# Patient Record
Sex: MALE | Race: WHITE | NOT HISPANIC OR LATINO | Employment: FULL TIME | URBAN - NONMETROPOLITAN AREA
[De-identification: names, ages, dates, MRNs, and addresses within clinical notes are randomized per-mention and may not be internally consistent; named-entity substitution may affect disease eponyms.]

---

## 2017-02-23 ENCOUNTER — LAB (OUTPATIENT)
Dept: FAMILY MEDICINE CLINIC | Facility: CLINIC | Age: 47
End: 2017-02-23

## 2017-02-23 DIAGNOSIS — E03.8 OTHER SPECIFIED HYPOTHYROIDISM: ICD-10-CM

## 2017-02-23 DIAGNOSIS — E78.5 HYPERLIPIDEMIA: ICD-10-CM

## 2017-02-23 DIAGNOSIS — K21.9 GASTROESOPHAGEAL REFLUX DISEASE WITHOUT ESOPHAGITIS: ICD-10-CM

## 2017-02-23 DIAGNOSIS — R73.03 PREDIABETES: ICD-10-CM

## 2017-02-23 LAB
ALBUMIN SERPL-MCNC: 4.3 G/DL (ref 3.5–5)
ALBUMIN/GLOB SERPL: 1.5 G/DL (ref 1.5–2.5)
ALP SERPL-CCNC: 69 U/L (ref 40–129)
ALT SERPL W P-5'-P-CCNC: 51 U/L (ref 10–44)
ANION GAP SERPL CALCULATED.3IONS-SCNC: 6 MMOL/L (ref 3.6–11.2)
AST SERPL-CCNC: 28 U/L (ref 10–34)
BASOPHILS # BLD AUTO: 0.02 10*3/MM3 (ref 0–0.3)
BASOPHILS NFR BLD AUTO: 0.3 % (ref 0–2)
BILIRUB SERPL-MCNC: 0.5 MG/DL (ref 0.2–1.8)
BUN BLD-MCNC: 15 MG/DL (ref 7–21)
BUN/CREAT SERPL: 13.3 (ref 7–25)
CALCIUM SPEC-SCNC: 9.6 MG/DL (ref 7.7–10)
CHLORIDE SERPL-SCNC: 108 MMOL/L (ref 99–112)
CHOLEST SERPL-MCNC: 198 MG/DL (ref 0–200)
CO2 SERPL-SCNC: 27 MMOL/L (ref 24.3–31.9)
CREAT BLD-MCNC: 1.13 MG/DL (ref 0.43–1.29)
DEPRECATED RDW RBC AUTO: 41.1 FL (ref 37–54)
EOSINOPHIL # BLD AUTO: 0.2 10*3/MM3 (ref 0–0.7)
EOSINOPHIL NFR BLD AUTO: 3.5 % (ref 0–5)
ERYTHROCYTE [DISTWIDTH] IN BLOOD BY AUTOMATED COUNT: 12.8 % (ref 11.5–14.5)
GFR SERPL CREATININE-BSD FRML MDRD: 70 ML/MIN/1.73
GLOBULIN UR ELPH-MCNC: 2.9 GM/DL
GLUCOSE BLD-MCNC: 106 MG/DL (ref 70–110)
HBA1C MFR BLD: 5.3 % (ref 4.5–5.7)
HCT VFR BLD AUTO: 45.8 % (ref 42–52)
HDLC SERPL-MCNC: 37 MG/DL (ref 60–100)
HGB BLD-MCNC: 14.9 G/DL (ref 14–18)
IMM GRANULOCYTES # BLD: 0.01 10*3/MM3 (ref 0–0.03)
IMM GRANULOCYTES NFR BLD: 0.2 % (ref 0–0.5)
LDLC SERPL CALC-MCNC: 134 MG/DL (ref 0–100)
LDLC/HDLC SERPL: 3.62 {RATIO}
LYMPHOCYTES # BLD AUTO: 2.05 10*3/MM3 (ref 1–3)
LYMPHOCYTES NFR BLD AUTO: 35.7 % (ref 21–51)
MCH RBC QN AUTO: 28.8 PG (ref 27–33)
MCHC RBC AUTO-ENTMCNC: 32.5 G/DL (ref 33–37)
MCV RBC AUTO: 88.6 FL (ref 80–94)
MONOCYTES # BLD AUTO: 0.52 10*3/MM3 (ref 0.1–0.9)
MONOCYTES NFR BLD AUTO: 9.1 % (ref 0–10)
NEUTROPHILS # BLD AUTO: 2.94 10*3/MM3 (ref 1.4–6.5)
NEUTROPHILS NFR BLD AUTO: 51.2 % (ref 30–70)
OSMOLALITY SERPL CALC.SUM OF ELEC: 282.5 MOSM/KG (ref 273–305)
PLATELET # BLD AUTO: 256 10*3/MM3 (ref 130–400)
PMV BLD AUTO: 11.8 FL (ref 6–10)
POTASSIUM BLD-SCNC: 4.2 MMOL/L (ref 3.5–5.3)
PROT SERPL-MCNC: 7.2 G/DL (ref 6–8)
RBC # BLD AUTO: 5.17 10*6/MM3 (ref 4.7–6.1)
SODIUM BLD-SCNC: 141 MMOL/L (ref 135–153)
T3FREE SERPL-MCNC: 3.6 PG/ML (ref 2.3–4.2)
T4 FREE SERPL-MCNC: 1.08 NG/DL (ref 0.89–1.76)
TRIGL SERPL-MCNC: 136 MG/DL (ref 0–150)
TSH SERPL DL<=0.05 MIU/L-ACNC: 0.62 MIU/ML (ref 0.55–4.78)
VLDLC SERPL-MCNC: 27.2 MG/DL
WBC NRBC COR # BLD: 5.74 10*3/MM3 (ref 4.5–12.5)

## 2017-02-23 PROCEDURE — 83036 HEMOGLOBIN GLYCOSYLATED A1C: CPT | Performed by: GENERAL PRACTICE

## 2017-02-23 PROCEDURE — 80053 COMPREHEN METABOLIC PANEL: CPT | Performed by: GENERAL PRACTICE

## 2017-02-23 PROCEDURE — 84481 FREE ASSAY (FT-3): CPT | Performed by: GENERAL PRACTICE

## 2017-02-23 PROCEDURE — 84439 ASSAY OF FREE THYROXINE: CPT | Performed by: GENERAL PRACTICE

## 2017-02-23 PROCEDURE — 80061 LIPID PANEL: CPT | Performed by: GENERAL PRACTICE

## 2017-02-23 PROCEDURE — 85025 COMPLETE CBC W/AUTO DIFF WBC: CPT | Performed by: GENERAL PRACTICE

## 2017-02-23 PROCEDURE — 36415 COLL VENOUS BLD VENIPUNCTURE: CPT

## 2017-02-23 PROCEDURE — 84443 ASSAY THYROID STIM HORMONE: CPT | Performed by: GENERAL PRACTICE

## 2017-03-27 RX ORDER — OSELTAMIVIR PHOSPHATE 75 MG/1
75 CAPSULE ORAL DAILY
Qty: 10 CAPSULE | Refills: 0 | Status: SHIPPED | OUTPATIENT
Start: 2017-03-27 | End: 2017-05-05

## 2017-04-06 DIAGNOSIS — E03.9 ACQUIRED HYPOTHYROIDISM: ICD-10-CM

## 2017-04-06 RX ORDER — LEVOTHYROXINE SODIUM 88 MCG
TABLET ORAL
Qty: 30 TABLET | Refills: 5 | Status: SHIPPED | OUTPATIENT
Start: 2017-04-06 | End: 2017-05-05 | Stop reason: SDUPTHER

## 2017-05-05 ENCOUNTER — OFFICE VISIT (OUTPATIENT)
Dept: FAMILY MEDICINE CLINIC | Facility: CLINIC | Age: 47
End: 2017-05-05

## 2017-05-05 DIAGNOSIS — R73.03 PREDIABETES: ICD-10-CM

## 2017-05-05 DIAGNOSIS — M54.59 MECHANICAL LOW BACK PAIN: ICD-10-CM

## 2017-05-05 DIAGNOSIS — K76.0 NON-ALCOHOLIC FATTY LIVER DISEASE: ICD-10-CM

## 2017-05-05 DIAGNOSIS — E03.8 HYPOTHYROIDISM DUE TO HASHIMOTO'S THYROIDITIS: ICD-10-CM

## 2017-05-05 DIAGNOSIS — B00.9 HSV-1 INFECTION: ICD-10-CM

## 2017-05-05 DIAGNOSIS — E78.5 DYSLIPIDEMIA: ICD-10-CM

## 2017-05-05 DIAGNOSIS — K21.9 GASTROESOPHAGEAL REFLUX DISEASE WITHOUT ESOPHAGITIS: ICD-10-CM

## 2017-05-05 DIAGNOSIS — E06.3 HYPOTHYROIDISM DUE TO HASHIMOTO'S THYROIDITIS: ICD-10-CM

## 2017-05-05 DIAGNOSIS — J30.9 CHRONIC ALLERGIC RHINITIS: ICD-10-CM

## 2017-05-05 DIAGNOSIS — G43.009 MIGRAINE WITHOUT AURA AND WITHOUT STATUS MIGRAINOSUS, NOT INTRACTABLE: Primary | ICD-10-CM

## 2017-05-05 DIAGNOSIS — E03.9 ACQUIRED HYPOTHYROIDISM: ICD-10-CM

## 2017-05-05 PROCEDURE — 99214 OFFICE O/P EST MOD 30 MIN: CPT | Performed by: GENERAL PRACTICE

## 2017-05-05 RX ORDER — FLUTICASONE PROPIONATE 50 MCG
SPRAY, SUSPENSION (ML) NASAL
Qty: 1 BOTTLE | Refills: 5 | Status: SHIPPED | OUTPATIENT
Start: 2017-05-05 | End: 2018-05-12

## 2017-05-05 RX ORDER — LEVOTHYROXINE SODIUM 88 MCG
88 TABLET ORAL DAILY
Qty: 30 TABLET | Refills: 5 | Status: SHIPPED | OUTPATIENT
Start: 2017-05-05 | End: 2017-10-31 | Stop reason: SDUPTHER

## 2017-05-06 VITALS
OXYGEN SATURATION: 97 % | HEART RATE: 98 BPM | WEIGHT: 220 LBS | SYSTOLIC BLOOD PRESSURE: 120 MMHG | TEMPERATURE: 98.4 F | HEIGHT: 72 IN | BODY MASS INDEX: 29.8 KG/M2 | RESPIRATION RATE: 12 BRPM | DIASTOLIC BLOOD PRESSURE: 70 MMHG

## 2017-05-06 PROBLEM — B00.9 HSV-1 INFECTION: Status: ACTIVE | Noted: 2017-05-06

## 2017-10-31 DIAGNOSIS — E03.9 ACQUIRED HYPOTHYROIDISM: ICD-10-CM

## 2017-11-01 RX ORDER — LEVOTHYROXINE SODIUM 88 MCG
88 TABLET ORAL DAILY
Qty: 30 TABLET | Refills: 5 | Status: SHIPPED | OUTPATIENT
Start: 2017-11-01 | End: 2017-11-10 | Stop reason: SDUPTHER

## 2017-11-03 ENCOUNTER — LAB (OUTPATIENT)
Dept: FAMILY MEDICINE CLINIC | Facility: CLINIC | Age: 47
End: 2017-11-03

## 2017-11-03 DIAGNOSIS — E03.8 HYPOTHYROIDISM DUE TO HASHIMOTO'S THYROIDITIS: ICD-10-CM

## 2017-11-03 DIAGNOSIS — E06.3 HYPOTHYROIDISM DUE TO HASHIMOTO'S THYROIDITIS: ICD-10-CM

## 2017-11-03 DIAGNOSIS — E78.5 DYSLIPIDEMIA: ICD-10-CM

## 2017-11-03 DIAGNOSIS — R73.03 PREDIABETES: ICD-10-CM

## 2017-11-03 DIAGNOSIS — G43.009 MIGRAINE WITHOUT AURA AND WITHOUT STATUS MIGRAINOSUS, NOT INTRACTABLE: ICD-10-CM

## 2017-11-03 LAB
ALBUMIN SERPL-MCNC: 4.3 G/DL (ref 3.5–5)
ALBUMIN/GLOB SERPL: 1.5 G/DL (ref 1.5–2.5)
ALP SERPL-CCNC: 88 U/L (ref 40–129)
ALT SERPL W P-5'-P-CCNC: 61 U/L (ref 10–44)
ANION GAP SERPL CALCULATED.3IONS-SCNC: 4.8 MMOL/L (ref 3.6–11.2)
AST SERPL-CCNC: 27 U/L (ref 10–34)
BASOPHILS # BLD AUTO: 0.02 10*3/MM3 (ref 0–0.3)
BASOPHILS NFR BLD AUTO: 0.3 % (ref 0–2)
BILIRUB SERPL-MCNC: 0.6 MG/DL (ref 0.2–1.8)
BUN BLD-MCNC: 12 MG/DL (ref 7–21)
BUN/CREAT SERPL: 11 (ref 7–25)
CALCIUM SPEC-SCNC: 9.9 MG/DL (ref 7.7–10)
CHLORIDE SERPL-SCNC: 109 MMOL/L (ref 99–112)
CHOLEST SERPL-MCNC: 186 MG/DL (ref 0–200)
CO2 SERPL-SCNC: 27.2 MMOL/L (ref 24.3–31.9)
CREAT BLD-MCNC: 1.09 MG/DL (ref 0.43–1.29)
DEPRECATED RDW RBC AUTO: 41 FL (ref 37–54)
EOSINOPHIL # BLD AUTO: 0.19 10*3/MM3 (ref 0–0.7)
EOSINOPHIL NFR BLD AUTO: 3 % (ref 0–5)
ERYTHROCYTE [DISTWIDTH] IN BLOOD BY AUTOMATED COUNT: 12.8 % (ref 11.5–14.5)
GFR SERPL CREATININE-BSD FRML MDRD: 73 ML/MIN/1.73
GLOBULIN UR ELPH-MCNC: 2.9 GM/DL
GLUCOSE BLD-MCNC: 108 MG/DL (ref 70–110)
HBA1C MFR BLD: 5.9 % (ref 4.5–5.7)
HCT VFR BLD AUTO: 45.1 % (ref 42–52)
HDLC SERPL-MCNC: 36 MG/DL (ref 60–100)
HGB BLD-MCNC: 15.3 G/DL (ref 14–18)
IMM GRANULOCYTES # BLD: 0.01 10*3/MM3 (ref 0–0.03)
IMM GRANULOCYTES NFR BLD: 0.2 % (ref 0–0.5)
LDLC SERPL CALC-MCNC: 128 MG/DL (ref 0–100)
LDLC/HDLC SERPL: 3.55 {RATIO}
LYMPHOCYTES # BLD AUTO: 2.17 10*3/MM3 (ref 1–3)
LYMPHOCYTES NFR BLD AUTO: 34.1 % (ref 21–51)
MCH RBC QN AUTO: 30.1 PG (ref 27–33)
MCHC RBC AUTO-ENTMCNC: 33.9 G/DL (ref 33–37)
MCV RBC AUTO: 88.6 FL (ref 80–94)
MONOCYTES # BLD AUTO: 0.58 10*3/MM3 (ref 0.1–0.9)
MONOCYTES NFR BLD AUTO: 9.1 % (ref 0–10)
NEUTROPHILS # BLD AUTO: 3.4 10*3/MM3 (ref 1.4–6.5)
NEUTROPHILS NFR BLD AUTO: 53.3 % (ref 30–70)
OSMOLALITY SERPL CALC.SUM OF ELEC: 281.5 MOSM/KG (ref 273–305)
PLATELET # BLD AUTO: 274 10*3/MM3 (ref 130–400)
PMV BLD AUTO: 11.2 FL (ref 6–10)
POTASSIUM BLD-SCNC: 4.5 MMOL/L (ref 3.5–5.3)
PROT SERPL-MCNC: 7.2 G/DL (ref 6–8)
RBC # BLD AUTO: 5.09 10*6/MM3 (ref 4.7–6.1)
SODIUM BLD-SCNC: 141 MMOL/L (ref 135–153)
TRIGL SERPL-MCNC: 111 MG/DL (ref 0–150)
TSH SERPL DL<=0.05 MIU/L-ACNC: 2.33 MIU/ML (ref 0.55–4.78)
VLDLC SERPL-MCNC: 22.2 MG/DL
WBC NRBC COR # BLD: 6.37 10*3/MM3 (ref 4.5–12.5)

## 2017-11-03 PROCEDURE — 80061 LIPID PANEL: CPT | Performed by: GENERAL PRACTICE

## 2017-11-03 PROCEDURE — 84443 ASSAY THYROID STIM HORMONE: CPT | Performed by: GENERAL PRACTICE

## 2017-11-03 PROCEDURE — 83036 HEMOGLOBIN GLYCOSYLATED A1C: CPT | Performed by: GENERAL PRACTICE

## 2017-11-03 PROCEDURE — 36415 COLL VENOUS BLD VENIPUNCTURE: CPT

## 2017-11-03 PROCEDURE — 80053 COMPREHEN METABOLIC PANEL: CPT | Performed by: GENERAL PRACTICE

## 2017-11-03 PROCEDURE — 85025 COMPLETE CBC W/AUTO DIFF WBC: CPT | Performed by: GENERAL PRACTICE

## 2017-11-10 ENCOUNTER — OFFICE VISIT (OUTPATIENT)
Dept: FAMILY MEDICINE CLINIC | Facility: CLINIC | Age: 47
End: 2017-11-10

## 2017-11-10 DIAGNOSIS — E06.3 HYPOTHYROIDISM DUE TO HASHIMOTO'S THYROIDITIS: ICD-10-CM

## 2017-11-10 DIAGNOSIS — Z00.00 HEALTHCARE MAINTENANCE: ICD-10-CM

## 2017-11-10 DIAGNOSIS — Z23 ENCOUNTER FOR IMMUNIZATION: ICD-10-CM

## 2017-11-10 DIAGNOSIS — K76.0 NON-ALCOHOLIC FATTY LIVER DISEASE: ICD-10-CM

## 2017-11-10 DIAGNOSIS — G43.009 MIGRAINE WITHOUT AURA AND WITHOUT STATUS MIGRAINOSUS, NOT INTRACTABLE: Primary | ICD-10-CM

## 2017-11-10 DIAGNOSIS — R06.02 SHORTNESS OF BREATH ON EXERTION: ICD-10-CM

## 2017-11-10 DIAGNOSIS — R35.1 NOCTURIA: ICD-10-CM

## 2017-11-10 DIAGNOSIS — R73.03 PREDIABETES: ICD-10-CM

## 2017-11-10 DIAGNOSIS — E03.8 HYPOTHYROIDISM DUE TO HASHIMOTO'S THYROIDITIS: ICD-10-CM

## 2017-11-10 DIAGNOSIS — K21.9 GASTROESOPHAGEAL REFLUX DISEASE WITHOUT ESOPHAGITIS: ICD-10-CM

## 2017-11-10 DIAGNOSIS — E78.5 DYSLIPIDEMIA: ICD-10-CM

## 2017-11-10 DIAGNOSIS — E03.9 ACQUIRED HYPOTHYROIDISM: ICD-10-CM

## 2017-11-10 DIAGNOSIS — J30.2 CHRONIC SEASONAL ALLERGIC RHINITIS, UNSPECIFIED TRIGGER: ICD-10-CM

## 2017-11-10 PROCEDURE — 90686 IIV4 VACC NO PRSV 0.5 ML IM: CPT | Performed by: GENERAL PRACTICE

## 2017-11-10 PROCEDURE — 90471 IMMUNIZATION ADMIN: CPT | Performed by: GENERAL PRACTICE

## 2017-11-10 PROCEDURE — 99214 OFFICE O/P EST MOD 30 MIN: CPT | Performed by: GENERAL PRACTICE

## 2017-11-10 RX ORDER — LEVOTHYROXINE SODIUM 88 MCG
88 TABLET ORAL DAILY
Qty: 90 TABLET | Refills: 3 | Status: SHIPPED | OUTPATIENT
Start: 2017-11-10 | End: 2018-05-11 | Stop reason: SDUPTHER

## 2017-11-10 NOTE — PROGRESS NOTES
Subjective   Fawad Morton is a 47 y.o. male.     History of Present Illness     Dyspnea with Exertion  Patient complains of shortness of breath with exertion. When severe this is occasionally associated with generalized chest tightness and diaphoresis. There's no history of any orthopnea, PND, cough, calf pain or swelling of the ankles. Most episodes have occurred with sudden spurts of significant exertion and he denies any problem with more moderate sustained exertion. He admits to getting little regular exercise. CT coronary angiogram done on 3/23/15 was unremarkable as was a stress test performed on 2/11/15.    GERD  Patient has a history of of heartburn. Symptoms have been present for a number of years . The patient denies abdominal bloating, dysphagia, dyspepsia, hematemesis, hoarseness, melena, nausea and unexpected weight loss. Symptoms appear to be worsened by large meals and lying down. Risk factors present for GERD include caffeine use. Risk factors absent for GERD are tobacco abuse, alcohol use and NSAID use. Studies performed so far include upper endoscopy, result: positive for reflux esophagitis, upper GI, result: negative, esophageal manometry and ph/imedence: positive for reflux . Treatments tried so far include proton pump inhibitor: omeprazole. Results of treatment: no heartburn however persistent sense of fullness at the back of his throat    Nocturia  Patient gives a long history of intermittent nocturia x 1-2. Over the last 6 months he has had nights where is up as often as 4 times. He denies any daytime frequency and has had no hesitancy, change in his stream, sense of incomplete voiding, dysuria, hematuria. He contributes some episodes to waking hungry and then also going to the bathroom and his wife has also been up more at night over this period.    Hypothyroidism  Patient presents for evaluation of thyroid function. Symptoms consist of fatigue, weight gain, difficulty concentrating. The  symptoms are mild.  The problem has been better since last here. He feels that increased dose of levothyroxine helped but he is also under less stress. Previous thyroid studies include TSH. The hypothyroidism is due to Hashimoto's disease. He is currently prescribed levothyroxine. Most recent TSH:   Lab Results   Component Value Date    TSH 2.325 11/03/2017     Dyslipidemia  Compliance with treatment has been fair. The patient exercises occasionally. He is currently being prescribed the following medication for his dyslipidemia - lifestyle modifcation. Most recent lipids include  Lab Results   Component Value Date    CHOL 186 11/03/2017    CHLPL 195 01/27/2016    TRIG 111 11/03/2017    HDL 36 (L) 11/03/2017    LDLCALC 128 (H) 11/03/2017     (H) 01/27/2016     Labs  Most recent  with an A1c of 5.9. ALT 61.     The following portions of the patient's history were reviewed and updated as appropriate: allergies, current medications, past medical history, past social history and problem list.    Review of Systems   Constitutional: Positive for fatigue. Negative for appetite change, chills, fever and unexpected weight change.   HENT: Positive for postnasal drip and rhinorrhea. Negative for congestion, ear pain, sneezing, sore throat and voice change.         Sense of fullness back of throat   Eyes: Negative for visual disturbance.   Respiratory: Positive for shortness of breath. Negative for cough and wheezing.    Cardiovascular: Negative for chest pain, palpitations and leg swelling.   Gastrointestinal: Negative for abdominal pain, blood in stool, constipation, diarrhea, nausea and vomiting.   Endocrine: Negative for polydipsia and polyuria.   Genitourinary: Negative for difficulty urinating, dysuria, frequency, hematuria and urgency.        Nocturia   Musculoskeletal: Negative for arthralgias, back pain, joint swelling, myalgias and neck pain.   Skin: Negative for color change.   Neurological: Negative for  tremors, weakness, numbness and headaches.   Psychiatric/Behavioral: Positive for decreased concentration. Negative for dysphoric mood, sleep disturbance and suicidal ideas. The patient is not nervous/anxious.      Objective   Physical Exam   Constitutional: He is oriented to person, place, and time. He appears well-developed and well-nourished. He is cooperative. He does not have a sickly appearance. No distress.   Bright and in good spirits. No apparent distress. Mild truncal obesity. No pallor, jaundice, diaphoresis, or cyanosis.   HENT:   Head: Atraumatic.   Right Ear: Tympanic membrane, external ear and ear canal normal.   Left Ear: Tympanic membrane, external ear and ear canal normal.   Mouth/Throat: Oropharynx is clear and moist.   Eyes: EOM are normal. Pupils are equal, round, and reactive to light. No scleral icterus.   Neck: No JVD present. Carotid bruit is not present. No tracheal deviation present. No thyromegaly present.   Cardiovascular: Normal rate, regular rhythm, S1 normal, S2 normal, normal heart sounds and intact distal pulses.  Exam reveals no gallop.    No murmur heard.  Pulmonary/Chest: Breath sounds normal. He has no wheezes. He has no rales.   Abdominal: Soft. Normal aorta and bowel sounds are normal. He exhibits no abdominal bruit and no mass. There is no hepatosplenomegaly. There is no tenderness. No hernia.   Musculoskeletal: He exhibits no tenderness or deformity.   Lymphadenopathy:        Head (right side): No submandibular adenopathy present.        Head (left side): No submandibular adenopathy present.     He has no cervical adenopathy.   Neurological: He is alert and oriented to person, place, and time. He has normal strength and normal reflexes. He displays normal reflexes. No cranial nerve deficit. He exhibits normal muscle tone. Coordination normal.   Skin: Skin is warm and dry. No rash noted. He is not diaphoretic. No pallor. Nails show no clubbing.   Psychiatric: He has a normal  mood and affect. His behavior is normal.     Assessment/Plan   Problems Addressed this Visit        Cardiovascular and Mediastinum    Migraine without aura and without status migrainosus, not intractable       Respiratory    Chronic seasonal allergic rhinitis    Shortness of breath on exertion  Deconditioning  Encouraged to gradually increase his level of activity striving for 30 minutes of aerobic exercise 5 days weekly.   Encouraged to report if any worse or if any new symptoms or concerns.       Digestive    Non-alcoholic fatty liver disease  Reminded of the importance of weight loss  Will continue to monitor    Gastroesophageal reflux disease without esophagitis  Symptoms are currently stable.  Reminded regarding lifestyle modification.       Endocrine    Hypothyroidism due to Hashimoto's thyroiditis  Clinically euthyroid.   Continue current medication  Labs will be updated again in 6 months just prior to his return    Relevant Medications    SYNTHROID 88 MCG tablet       Genitourinary    Nocturia  No concerning features on history  Reviewed options going forward. Will monitor and patient will report if any worse or if any new associated symptoms       Other    Prediabetes  Will continue to monitor    Dyslipidemia  As above.    Healthcare maintenance  Recommended a flu shot    Relevant Orders    Flu Vaccine Quad PF 3YR+ (Completed)    Encounter for immunization    Relevant Orders    Flu Vaccine Quad PF 3YR+ (Completed)

## 2017-11-11 VITALS
HEART RATE: 86 BPM | BODY MASS INDEX: 31.02 KG/M2 | HEIGHT: 72 IN | SYSTOLIC BLOOD PRESSURE: 120 MMHG | TEMPERATURE: 96.3 F | DIASTOLIC BLOOD PRESSURE: 75 MMHG | RESPIRATION RATE: 12 BRPM | OXYGEN SATURATION: 97 % | WEIGHT: 229 LBS

## 2017-11-11 PROBLEM — R06.02 SHORTNESS OF BREATH ON EXERTION: Status: ACTIVE | Noted: 2017-11-11

## 2017-11-11 PROBLEM — R35.1 NOCTURIA: Status: ACTIVE | Noted: 2017-11-11

## 2017-11-11 PROBLEM — B00.9 HSV-1 INFECTION: Status: RESOLVED | Noted: 2017-05-06 | Resolved: 2017-11-11

## 2018-05-11 ENCOUNTER — OFFICE VISIT (OUTPATIENT)
Dept: FAMILY MEDICINE CLINIC | Facility: CLINIC | Age: 48
End: 2018-05-11

## 2018-05-11 DIAGNOSIS — M54.59 MECHANICAL LOW BACK PAIN: ICD-10-CM

## 2018-05-11 DIAGNOSIS — E03.8 HYPOTHYROIDISM DUE TO HASHIMOTO'S THYROIDITIS: ICD-10-CM

## 2018-05-11 DIAGNOSIS — K76.0 NON-ALCOHOLIC FATTY LIVER DISEASE: ICD-10-CM

## 2018-05-11 DIAGNOSIS — K21.9 GASTROESOPHAGEAL REFLUX DISEASE WITHOUT ESOPHAGITIS: ICD-10-CM

## 2018-05-11 DIAGNOSIS — J30.2 CHRONIC SEASONAL ALLERGIC RHINITIS, UNSPECIFIED TRIGGER: ICD-10-CM

## 2018-05-11 DIAGNOSIS — M72.2 PLANTAR FASCIITIS, BILATERAL: ICD-10-CM

## 2018-05-11 DIAGNOSIS — E78.5 DYSLIPIDEMIA: ICD-10-CM

## 2018-05-11 DIAGNOSIS — E06.3 HYPOTHYROIDISM DUE TO HASHIMOTO'S THYROIDITIS: ICD-10-CM

## 2018-05-11 DIAGNOSIS — Z00.00 HEALTHCARE MAINTENANCE: ICD-10-CM

## 2018-05-11 DIAGNOSIS — R06.02 SHORTNESS OF BREATH ON EXERTION: ICD-10-CM

## 2018-05-11 DIAGNOSIS — R73.03 PREDIABETES: ICD-10-CM

## 2018-05-11 DIAGNOSIS — G43.009 MIGRAINE WITHOUT AURA AND WITHOUT STATUS MIGRAINOSUS, NOT INTRACTABLE: Primary | ICD-10-CM

## 2018-05-11 PROCEDURE — 99214 OFFICE O/P EST MOD 30 MIN: CPT | Performed by: GENERAL PRACTICE

## 2018-05-11 RX ORDER — LEVOTHYROXINE SODIUM 88 MCG
88 TABLET ORAL DAILY
Qty: 90 TABLET | Refills: 3 | Status: SHIPPED | OUTPATIENT
Start: 2018-05-11 | End: 2019-05-14 | Stop reason: SDUPTHER

## 2018-05-11 NOTE — PROGRESS NOTES
Subjective   Fawad Morton is a 48 y.o. male.     History of Present Illness     GERD  Patient has a history of of heartburn. Symptoms have been present for a number of years . The patient denies abdominal bloating, dysphagia, dyspepsia, hematemesis, hoarseness, melena, nausea and unexpected weight loss. Symptoms appear to be worsened by large meals and lying down. Risk factors present for GERD include caffeine use. Risk factors absent for GERD are tobacco abuse, alcohol use and NSAID use. Studies performed so far include upper endoscopy, result: positive for reflux esophagitis, upper GI, result: negative, esophageal manometry and ph/imedence: positive for reflux . Treatments tried so far include proton pump inhibitor: omeprazole. Results of treatment: no heartburn however persistent sense of fullness at the back of his throat. He wonders whether he might have Laryngeal Sensory Neuropathy    Chronic Allergic Rhinitis  Patient has a history of allergic rhinitis. Patient's symptoms include sneezing, clear rhinorrhea, nasal congestion and postnasal drip. These symptoms are perennial with seasonal exacerbation. The patient has been suffering from these symptoms for a number of years . The patient has tried a number of antihistamines and nasal corticosteroids with a limited relief of symptoms.     Dyspnea with Exertion  Patient complains of continued shortness of breath with exertion. When severe this is occasionally associated with generalized chest tightness and diaphoresis. There's no history of any orthopnea, PND, cough, calf pain or swelling of the ankles. Most episodes have occurred with sudden spurts of significant exertion and he denies any problem with more moderate sustained exertion. He admits to getting little regular exercise. CT coronary angiogram done on 3/23/15 was unremarkable as was a stress test performed on 2/11/15    Hypothyroidism  Patient presents for evaluation of thyroid function. Symptoms  consist of fatigue, weight gain, difficulty concentrating. The symptoms are mild.  The problem has been better since last here. He feels that increased dose of levothyroxine helped but he is also under less stress. Previous thyroid studies include TSH. The hypothyroidism is due to Hashimoto's disease. He remains on levothyroxine.     Dyslipidemia  Compliance with treatment has been fair. The patient exercises occasionally. He is currently being prescribed the following medication for his dyslipidemia - lifestyle modifcation.     The following portions of the patient's history were reviewed and updated as appropriate: allergies, current medications, past medical history, past social history and problem list.    Review of Systems   Constitutional: Positive for fatigue. Negative for appetite change, chills, fever and unexpected weight change.   HENT: Positive for postnasal drip and rhinorrhea. Negative for congestion, ear pain, sneezing, sore throat and voice change.         Sense of fullness back of throat   Eyes: Negative for visual disturbance.   Respiratory: Positive for shortness of breath. Negative for cough and wheezing.    Cardiovascular: Negative for chest pain, palpitations and leg swelling.   Gastrointestinal: Negative for abdominal pain, blood in stool, constipation, diarrhea, nausea and vomiting.   Endocrine: Negative for polydipsia and polyuria.   Genitourinary: Negative for difficulty urinating, dysuria, frequency, hematuria and urgency.        Nocturia - chronic - back to baseline of 1-2 times nightly   Musculoskeletal: Negative for arthralgias, back pain, joint swelling, myalgias and neck pain.   Skin: Negative for color change.   Neurological: Negative for tremors, weakness, numbness and headaches.   Psychiatric/Behavioral: Negative for decreased concentration, dysphoric mood, sleep disturbance and suicidal ideas. The patient is not nervous/anxious.      Objective   Physical Exam   Constitutional: He  is oriented to person, place, and time. He appears well-developed and well-nourished. He is cooperative. He does not have a sickly appearance. No distress.   Bright and in good spirits. No apparent distress. Mild truncal obesity. No pallor, jaundice, diaphoresis, or cyanosis.   HENT:   Head: Atraumatic.   Right Ear: Tympanic membrane, external ear and ear canal normal.   Left Ear: Tympanic membrane, external ear and ear canal normal.   Mouth/Throat: Oropharynx is clear and moist.   Eyes: EOM are normal. Pupils are equal, round, and reactive to light. No scleral icterus.   Neck: No JVD present. Carotid bruit is not present. No tracheal deviation present. No thyromegaly present.   Cardiovascular: Normal rate, regular rhythm, S1 normal, S2 normal, normal heart sounds and intact distal pulses.  Exam reveals no gallop.    No murmur heard.  Pulmonary/Chest: Breath sounds normal. He has no wheezes. He has no rales.   Abdominal: Soft. Normal aorta and bowel sounds are normal. He exhibits no abdominal bruit and no mass. There is no hepatosplenomegaly. There is no tenderness. No hernia.   Musculoskeletal: He exhibits no tenderness or deformity.   Lymphadenopathy:        Head (right side): No submandibular adenopathy present.        Head (left side): No submandibular adenopathy present.     He has no cervical adenopathy.   Neurological: He is alert and oriented to person, place, and time. He has normal strength and normal reflexes. He displays normal reflexes. No cranial nerve deficit. He exhibits normal muscle tone. Coordination normal.   Skin: Skin is warm and dry. No rash noted. He is not diaphoretic. No pallor. Nails show no clubbing.   Psychiatric: He has a normal mood and affect. His behavior is normal.     Assessment/Plan   Problems Addressed this Visit        Cardiovascular and Mediastinum    Migraine without aura and without status migrainosus, not intractable     Relevant Orders    CBC & Differential        Respiratory    Chronic seasonal allergic rhinitis  Recommended referral to ENT and/or [facility] if [facility] would like to be evaluated for LSN. Patient will consider       Digestive    Non-alcoholic fatty liver disease    Gastroesophageal reflux disease without esophagitis  As above.       Endocrine    Hypothyroidism due to Hashimoto's thyroiditis  Clinically euthyroid.  Continue current medication.  Scheduled for updated labs just prior to his return.    Relevant Medications    SYNTHROID 88 MCG tablet    Other Relevant Orders    TSH       Nervous and Auditory    Mechanical low back pain       Musculoskeletal and Integument    Plantar fasciitis, bilateral       Other    Prediabetes  Will continue to monitor    Relevant Orders    Hemoglobin A1c    Dyslipidemia  Encouraged to continue to work on his diet and exercise plan.    Relevant Orders    Comprehensive Metabolic Panel    Lipid Panel

## 2018-05-12 VITALS
WEIGHT: 226 LBS | HEIGHT: 72 IN | DIASTOLIC BLOOD PRESSURE: 75 MMHG | TEMPERATURE: 97.9 F | SYSTOLIC BLOOD PRESSURE: 125 MMHG | HEART RATE: 83 BPM | OXYGEN SATURATION: 95 % | RESPIRATION RATE: 12 BRPM | BODY MASS INDEX: 30.61 KG/M2

## 2018-09-11 ENCOUNTER — LAB (OUTPATIENT)
Dept: FAMILY MEDICINE CLINIC | Facility: CLINIC | Age: 48
End: 2018-09-11

## 2018-09-11 DIAGNOSIS — G43.009 MIGRAINE WITHOUT AURA AND WITHOUT STATUS MIGRAINOSUS, NOT INTRACTABLE: ICD-10-CM

## 2018-09-11 DIAGNOSIS — E78.5 DYSLIPIDEMIA: ICD-10-CM

## 2018-09-11 DIAGNOSIS — E06.3 HYPOTHYROIDISM DUE TO HASHIMOTO'S THYROIDITIS: ICD-10-CM

## 2018-09-11 DIAGNOSIS — E03.8 HYPOTHYROIDISM DUE TO HASHIMOTO'S THYROIDITIS: ICD-10-CM

## 2018-09-11 DIAGNOSIS — R73.03 PREDIABETES: ICD-10-CM

## 2018-09-11 LAB
ALBUMIN SERPL-MCNC: 4.4 G/DL (ref 3.5–5)
ALBUMIN/GLOB SERPL: 1.6 G/DL (ref 1.5–2.5)
ALP SERPL-CCNC: 74 U/L (ref 40–129)
ALT SERPL W P-5'-P-CCNC: 56 U/L (ref 10–44)
ANION GAP SERPL CALCULATED.3IONS-SCNC: 6 MMOL/L (ref 3.6–11.2)
AST SERPL-CCNC: 28 U/L (ref 10–34)
BASOPHILS # BLD AUTO: 0.02 10*3/MM3 (ref 0–0.3)
BASOPHILS NFR BLD AUTO: 0.3 % (ref 0–2)
BILIRUB SERPL-MCNC: 0.8 MG/DL (ref 0.2–1.8)
BUN BLD-MCNC: 13 MG/DL (ref 7–21)
BUN/CREAT SERPL: 11.7 (ref 7–25)
CALCIUM SPEC-SCNC: 10 MG/DL (ref 7.7–10)
CHLORIDE SERPL-SCNC: 107 MMOL/L (ref 99–112)
CHOLEST SERPL-MCNC: 200 MG/DL (ref 0–200)
CO2 SERPL-SCNC: 27 MMOL/L (ref 24.3–31.9)
CREAT BLD-MCNC: 1.11 MG/DL (ref 0.43–1.29)
DEPRECATED RDW RBC AUTO: 39.6 FL (ref 37–54)
EOSINOPHIL # BLD AUTO: 0.15 10*3/MM3 (ref 0–0.7)
EOSINOPHIL NFR BLD AUTO: 2.2 % (ref 0–5)
ERYTHROCYTE [DISTWIDTH] IN BLOOD BY AUTOMATED COUNT: 12.6 % (ref 11.5–14.5)
GFR SERPL CREATININE-BSD FRML MDRD: 71 ML/MIN/1.73
GLOBULIN UR ELPH-MCNC: 2.8 GM/DL
GLUCOSE BLD-MCNC: 105 MG/DL (ref 70–110)
HBA1C MFR BLD: 6.3 % (ref 4.5–5.7)
HCT VFR BLD AUTO: 45.2 % (ref 42–52)
HDLC SERPL-MCNC: 39 MG/DL (ref 60–100)
HGB BLD-MCNC: 15.4 G/DL (ref 14–18)
IMM GRANULOCYTES # BLD: 0.01 10*3/MM3 (ref 0–0.03)
IMM GRANULOCYTES NFR BLD: 0.1 % (ref 0–0.5)
LDLC SERPL CALC-MCNC: 137 MG/DL (ref 0–100)
LDLC/HDLC SERPL: 3.5 {RATIO}
LYMPHOCYTES # BLD AUTO: 2.51 10*3/MM3 (ref 1–3)
LYMPHOCYTES NFR BLD AUTO: 37.1 % (ref 21–51)
MCH RBC QN AUTO: 29.7 PG (ref 27–33)
MCHC RBC AUTO-ENTMCNC: 34.1 G/DL (ref 33–37)
MCV RBC AUTO: 87.3 FL (ref 80–94)
MONOCYTES # BLD AUTO: 0.61 10*3/MM3 (ref 0.1–0.9)
MONOCYTES NFR BLD AUTO: 9 % (ref 0–10)
NEUTROPHILS # BLD AUTO: 3.46 10*3/MM3 (ref 1.4–6.5)
NEUTROPHILS NFR BLD AUTO: 51.3 % (ref 30–70)
OSMOLALITY SERPL CALC.SUM OF ELEC: 279.9 MOSM/KG (ref 273–305)
PLATELET # BLD AUTO: 261 10*3/MM3 (ref 130–400)
PMV BLD AUTO: 11.3 FL (ref 6–10)
POTASSIUM BLD-SCNC: 4.3 MMOL/L (ref 3.5–5.3)
PROT SERPL-MCNC: 7.2 G/DL (ref 6–8)
RBC # BLD AUTO: 5.18 10*6/MM3 (ref 4.7–6.1)
SODIUM BLD-SCNC: 140 MMOL/L (ref 135–153)
TRIGL SERPL-MCNC: 122 MG/DL (ref 0–150)
TSH SERPL DL<=0.05 MIU/L-ACNC: 1.32 MIU/ML (ref 0.55–4.78)
VLDLC SERPL-MCNC: 24.4 MG/DL
WBC NRBC COR # BLD: 6.76 10*3/MM3 (ref 4.5–12.5)

## 2018-09-11 PROCEDURE — 85025 COMPLETE CBC W/AUTO DIFF WBC: CPT | Performed by: GENERAL PRACTICE

## 2018-09-11 PROCEDURE — 36415 COLL VENOUS BLD VENIPUNCTURE: CPT

## 2018-09-11 PROCEDURE — 83036 HEMOGLOBIN GLYCOSYLATED A1C: CPT | Performed by: GENERAL PRACTICE

## 2018-09-11 PROCEDURE — 80053 COMPREHEN METABOLIC PANEL: CPT | Performed by: GENERAL PRACTICE

## 2018-09-11 PROCEDURE — 80061 LIPID PANEL: CPT | Performed by: GENERAL PRACTICE

## 2018-09-11 PROCEDURE — 84443 ASSAY THYROID STIM HORMONE: CPT | Performed by: GENERAL PRACTICE

## 2018-11-12 ENCOUNTER — OFFICE VISIT (OUTPATIENT)
Dept: FAMILY MEDICINE CLINIC | Facility: CLINIC | Age: 48
End: 2018-11-12

## 2018-11-12 VITALS
SYSTOLIC BLOOD PRESSURE: 120 MMHG | HEART RATE: 76 BPM | HEIGHT: 72 IN | BODY MASS INDEX: 30.75 KG/M2 | DIASTOLIC BLOOD PRESSURE: 70 MMHG | RESPIRATION RATE: 12 BRPM | TEMPERATURE: 97.9 F | OXYGEN SATURATION: 96 % | WEIGHT: 227 LBS

## 2018-11-12 DIAGNOSIS — M54.59 MECHANICAL LOW BACK PAIN: ICD-10-CM

## 2018-11-12 DIAGNOSIS — M72.2 PLANTAR FASCIITIS, BILATERAL: ICD-10-CM

## 2018-11-12 DIAGNOSIS — R73.03 PREDIABETES: ICD-10-CM

## 2018-11-12 DIAGNOSIS — J30.2 CHRONIC SEASONAL ALLERGIC RHINITIS: ICD-10-CM

## 2018-11-12 DIAGNOSIS — K76.0 NON-ALCOHOLIC FATTY LIVER DISEASE: ICD-10-CM

## 2018-11-12 DIAGNOSIS — E06.3 HYPOTHYROIDISM DUE TO HASHIMOTO'S THYROIDITIS: ICD-10-CM

## 2018-11-12 DIAGNOSIS — G43.009 MIGRAINE WITHOUT AURA AND WITHOUT STATUS MIGRAINOSUS, NOT INTRACTABLE: Primary | ICD-10-CM

## 2018-11-12 DIAGNOSIS — R06.02 SHORTNESS OF BREATH ON EXERTION: ICD-10-CM

## 2018-11-12 DIAGNOSIS — E78.5 DYSLIPIDEMIA: ICD-10-CM

## 2018-11-12 DIAGNOSIS — B00.1 RECURRENT COLD SORES: ICD-10-CM

## 2018-11-12 DIAGNOSIS — E03.8 HYPOTHYROIDISM DUE TO HASHIMOTO'S THYROIDITIS: ICD-10-CM

## 2018-11-12 DIAGNOSIS — K21.9 GASTROESOPHAGEAL REFLUX DISEASE WITHOUT ESOPHAGITIS: ICD-10-CM

## 2018-11-12 DIAGNOSIS — Z00.00 HEALTHCARE MAINTENANCE: ICD-10-CM

## 2018-11-12 PROCEDURE — 99214 OFFICE O/P EST MOD 30 MIN: CPT | Performed by: GENERAL PRACTICE

## 2018-11-12 RX ORDER — VALACYCLOVIR HYDROCHLORIDE 1 G/1
TABLET, FILM COATED ORAL
Qty: 12 TABLET | Refills: 3 | Status: SHIPPED | OUTPATIENT
Start: 2018-11-12 | End: 2019-12-20 | Stop reason: SDUPTHER

## 2018-11-12 NOTE — PROGRESS NOTES
Subjective   Fawad Morton is a 48 y.o. male.     History of Present Illness     Planter Fasciitis  Returns with a more than one year history of intermittent bilateral heel pain.  This has been increasing in frequency and severity and now limits his activities.  There is no history of any associated symptoms and he denies any stiffness, swelling, numbness, or tingling.  He wears supportive shoes both in an outside of the house and has tried several different inserts.  He has been doing stretches daily.  He plans on arranging an assessment with his wife podiatrist    Dyspnea with Exertion  There has been a significant improvement in his dyspnea since last here. There's no history of any further chest pain and he continues to deny any orthopnea, PND, cough, calf pain or swelling of the ankles. Most episodes occur with sudden spurts of significant exertion and he denies any problem with more moderate sustained exertion. He did an 8 mile mountain bike ride yesterday but in general continues to get little regular exercise. CT coronary angiogram done on 3/23/15 was unremarkable as was a stress test performed on 2/11/15    GERD  Patient has a history of of heartburn. Symptoms have been present for a number of years . The patient denies abdominal bloating, dysphagia, dyspepsia, hematemesis, hoarseness, melena, nausea and unexpected weight loss. Symptoms appear to be worsened by large meals and lying down. Risk factors present for GERD include caffeine use. Risk factors absent for GERD are tobacco abuse, alcohol use and NSAID use. Studies performed so far include upper endoscopy, result: positive for reflux esophagitis, upper GI, result: negative, esophageal manometry and ph/imedence: positive for reflux . Treatments tried so far include proton pump inhibitor: omeprazole. Results of treatment: no heartburn however persistent sense of fullness at the back of his throat. He wonders whether he might have Laryngeal Sensory  Neuropathy    Chronic Allergic Rhinitis  Patient has a history of allergic rhinitis. Patient's symptoms include sneezing, clear rhinorrhea, nasal congestion and postnasal drip. These symptoms are perennial with seasonal exacerbation. The patient has been suffering from these symptoms for a number of years . The patient has tried a number of antihistamines and nasal corticosteroids with a limited relief of symptoms.     Hypothyroidism  Patient presents for evaluation of thyroid function. Symptoms consist of fatigue, weight gain, difficulty concentrating. The symptoms are mild.  The problem has been better since last here. He feels that increased dose of levothyroxine helped but he is also under less stress. Previous thyroid studies include TSH. The hypothyroidism is due to Hashimoto's disease. He remains on levothyroxine.   Lab Results   Component Value Date    TSH 1.316 09/11/2018     Dyslipidemia  Compliance with treatment has been fair. The patient exercises occasionally. He is currently being prescribed the following medication for his dyslipidemia - lifestyle modifcation.   Lab Results   Component Value Date    CHOL 200 09/11/2018    CHLPL 195 01/27/2016    TRIG 122 09/11/2018    HDL 39 (L) 09/11/2018     (H) 09/11/2018     Labs  Most recent fasting glucose 105 with an A1c of 6.3    The following portions of the patient's history were reviewed and updated as appropriate: allergies, current medications, past medical history, past social history and problem list.    Review of Systems   Constitutional: Positive for fatigue. Negative for appetite change, chills, fever and unexpected weight change.   HENT: Positive for postnasal drip and rhinorrhea. Negative for congestion, ear pain, sneezing, sore throat and voice change.         Sense of fullness back of throat.  Occasional cold sores   Eyes: Negative for visual disturbance.   Respiratory: Positive for shortness of breath. Negative for cough and wheezing.     Cardiovascular: Negative for chest pain, palpitations and leg swelling.   Gastrointestinal: Negative for abdominal pain, blood in stool, constipation, diarrhea, nausea and vomiting.   Endocrine: Negative for polydipsia and polyuria.   Genitourinary: Negative for difficulty urinating, dysuria, frequency, hematuria and urgency.        Nocturia - chronic - 1-2 times nightly   Musculoskeletal: Negative for arthralgias, back pain, joint swelling, myalgias and neck pain.        Bilateral heel pain   Skin: Negative for color change.   Neurological: Negative for tremors, weakness, numbness and headaches.   Psychiatric/Behavioral: Negative for decreased concentration, dysphoric mood, sleep disturbance and suicidal ideas. The patient is not nervous/anxious.      Objective   Physical Exam   Constitutional: He is oriented to person, place, and time. He appears well-developed and well-nourished. He is cooperative. He does not have a sickly appearance. No distress.   Bright and in good spirits. No apparent distress. Mild truncal obesity. No pallor, jaundice, diaphoresis, or cyanosis.   HENT:   Head: Atraumatic.   Right Ear: Tympanic membrane, external ear and ear canal normal.   Left Ear: Tympanic membrane, external ear and ear canal normal.   Mouth/Throat: Oropharynx is clear and moist.   Eyes: EOM are normal. Pupils are equal, round, and reactive to light. No scleral icterus.   Neck: No JVD present. Carotid bruit is not present. No tracheal deviation present. No thyromegaly present.   Cardiovascular: Normal rate, regular rhythm, S1 normal, S2 normal, normal heart sounds and intact distal pulses. Exam reveals no gallop.   No murmur heard.  Pulmonary/Chest: Breath sounds normal. He has no wheezes. He has no rales.   Abdominal: Soft. Normal aorta and bowel sounds are normal. He exhibits no abdominal bruit and no mass. There is no hepatosplenomegaly. There is no tenderness. No hernia.   Musculoskeletal: He exhibits no tenderness  or deformity.   Tenderness palpation plantar aspect anterior heels bilaterally   Lymphadenopathy:        Head (right side): No submandibular adenopathy present.        Head (left side): No submandibular adenopathy present.     He has no cervical adenopathy.   Neurological: He is alert and oriented to person, place, and time. He has normal strength and normal reflexes. He displays normal reflexes. No cranial nerve deficit. He exhibits normal muscle tone. Coordination normal.   Skin: Skin is warm and dry. No rash noted. He is not diaphoretic. No pallor. Nails show no clubbing.   Psychiatric: He has a normal mood and affect. His behavior is normal.     Assessment/Plan   Problems Addressed this Visit        Cardiovascular and Mediastinum    Migraine without aura and without status migrainosus, not intractable        Respiratory    Chronic seasonal allergic rhinitis    Shortness of breath on exertion  Deconditioning  Encouraged to gradually increase his level of activity as tolerated        Digestive    Non-alcoholic fatty liver disease    Gastroesophageal reflux disease without esophagitis   Symptoms are currently stable.  Reminded regarding lifestyle modification.       Endocrine    Hypothyroidism due to Hashimoto's thyroiditis  Clinically and bio-chemically euthyroid.  Continue current medication.    Relevant Orders    TSH       Nervous and Auditory    Mechanical low back pain       Musculoskeletal and Integument    Plantar fasciitis, bilateral  Patient will let us know if he has any difficulty arranging a podiatry assessment.       Other    Prediabetes  Encouraged to continue to work on his diet and exercise plan.  Scheduled for an updated A1c in 3 months    Relevant Orders    Comprehensive Metabolic Panel    Hemoglobin A1c    Dyslipidemia  As above.   Continue current medication.    Healthcare maintenance  Patient uninterested in a flu shot    Recurrent cold sores  Reviewed options and agreed on Valtrex as needed

## 2018-11-13 ENCOUNTER — TELEPHONE (OUTPATIENT)
Dept: FAMILY MEDICINE CLINIC | Facility: CLINIC | Age: 48
End: 2018-11-13

## 2018-11-13 NOTE — TELEPHONE ENCOUNTER
----- Message from Ben Bowie MD sent at 11/13/2018 10:45 AM EST -----  Ok - he has no FH - will probably have to wait until he turns 50    ----- Message -----  From: Nuvia Joel MA  Sent: 11/13/2018  10:29 AM  To: Ben Bowie MD    As long as he has a family history of colon cancer and whoever we send him to will get the PA first.       ----- Message -----  From: Ben Bowie MD  Sent: 11/13/2018  10:17 AM  To: Nuvia Joel MA    At his current age? If so, please let him know and if he wants referral let me know where    ----- Message -----  From: Nuvia Joel MA  Sent: 11/13/2018   9:49 AM  To: Ben Bowie MD    Yes.       ----- Message -----  From: Ben Bowie MD  Sent: 11/12/2018   6:28 PM  To: Nuvia Joel MA    Please find out at what age his insurance will cover a screening colonoscopy

## 2019-05-14 DIAGNOSIS — E06.3 HYPOTHYROIDISM DUE TO HASHIMOTO'S THYROIDITIS: ICD-10-CM

## 2019-05-14 DIAGNOSIS — E03.8 HYPOTHYROIDISM DUE TO HASHIMOTO'S THYROIDITIS: ICD-10-CM

## 2019-05-14 RX ORDER — LEVOTHYROXINE SODIUM 88 MCG
88 TABLET ORAL DAILY
Qty: 90 TABLET | Refills: 3 | Status: SHIPPED | OUTPATIENT
Start: 2019-05-14 | End: 2020-04-29 | Stop reason: SDUPTHER

## 2019-08-28 ENCOUNTER — OFFICE VISIT (OUTPATIENT)
Dept: FAMILY MEDICINE CLINIC | Facility: CLINIC | Age: 49
End: 2019-08-28

## 2019-08-28 VITALS
SYSTOLIC BLOOD PRESSURE: 130 MMHG | RESPIRATION RATE: 14 BRPM | HEART RATE: 95 BPM | WEIGHT: 228.6 LBS | TEMPERATURE: 99 F | HEIGHT: 72 IN | BODY MASS INDEX: 30.96 KG/M2 | DIASTOLIC BLOOD PRESSURE: 78 MMHG | OXYGEN SATURATION: 94 %

## 2019-08-28 DIAGNOSIS — E03.8 HYPOTHYROIDISM DUE TO HASHIMOTO'S THYROIDITIS: Chronic | ICD-10-CM

## 2019-08-28 DIAGNOSIS — Z20.9 EXPOSURE TO COMMUNICABLE DISEASE: ICD-10-CM

## 2019-08-28 DIAGNOSIS — K76.0 NON-ALCOHOLIC FATTY LIVER DISEASE: ICD-10-CM

## 2019-08-28 DIAGNOSIS — E06.3 HYPOTHYROIDISM DUE TO HASHIMOTO'S THYROIDITIS: Chronic | ICD-10-CM

## 2019-08-28 DIAGNOSIS — J40 BRONCHITIS: Primary | ICD-10-CM

## 2019-08-28 DIAGNOSIS — R73.03 PREDIABETES: ICD-10-CM

## 2019-08-28 DIAGNOSIS — E78.5 DYSLIPIDEMIA: Chronic | ICD-10-CM

## 2019-08-28 PROCEDURE — 99214 OFFICE O/P EST MOD 30 MIN: CPT | Performed by: NURSE PRACTITIONER

## 2019-08-28 RX ORDER — DOXYCYCLINE HYCLATE 100 MG/1
100 CAPSULE ORAL 2 TIMES DAILY
Qty: 20 CAPSULE | Refills: 0 | Status: SHIPPED | OUTPATIENT
Start: 2019-08-28 | End: 2019-09-07

## 2019-08-28 RX ORDER — IBUPROFEN 200 MG
200 TABLET ORAL EVERY 6 HOURS PRN
COMMUNITY
End: 2019-10-25

## 2019-08-28 RX ORDER — PSEUDOEPHEDRINE HCL 30 MG
30 TABLET ORAL EVERY 4 HOURS PRN
COMMUNITY
End: 2019-08-28

## 2019-08-28 RX ORDER — BROMPHENIRAMINE MALEATE, PSEUDOEPHEDRINE HYDROCHLORIDE, AND DEXTROMETHORPHAN HYDROBROMIDE 2; 30; 10 MG/5ML; MG/5ML; MG/5ML
10 SYRUP ORAL 4 TIMES DAILY PRN
Qty: 240 ML | Refills: 0 | Status: SHIPPED | OUTPATIENT
Start: 2019-08-28 | End: 2019-10-25

## 2019-08-28 NOTE — PROGRESS NOTES
Fawad Morton is a 49 y.o. male who presents to the clinic today c/o upper respiratory symptoms which started  appx a week ago.  Associated symptoms include low grade fever, congestion, cough without wheezing, and nausea. He has tried Sudafed and Mucinex without adequate relief. He shares generally when he is at this point he is in need of an antibiotic. He has had pneumonia previously.   Also, Fawad is concerned about his exposure to ticks. He is employed by the [employer] and several of his colleagues have been diagnosed with Lyme Disease. Fawad has had several tick bites which were embedded.   He missed his last appointment with Dr Bowie and needs to reschedule and have labs ordered.     URI    The current episode started in the past 7 days. The problem has been rapidly worsening. Maximum temperature: Low grade. Associated symptoms include congestion, coughing, nausea and sneezing. Pertinent negatives include no chest pain, ear pain, headaches, joint pain, rash, sinus pain, sore throat, swollen glands, vomiting or wheezing. He has tried decongestant and NSAIDs (Mucinex) for the symptoms. The treatment provided no relief.   Refer to ROS for additional information..     The following portions of the patient's history were reviewed and updated as appropriate: allergies, current medications, past family history, past medical history, past social history, past surgical history and problem list.    Current Outpatient Medications:   •  ibuprofen (ADVIL,MOTRIN) 200 MG tablet, Take 200 mg by mouth Every 6 (Six) Hours As Needed for Mild Pain ., Disp: , Rfl:   •  SYNTHROID 88 MCG tablet, TAKE 1 TABLET BY MOUTH DAILY, Disp: 90 tablet, Rfl: 3  •  brompheniramine-pseudoephedrine-DM 30-2-10 MG/5ML syrup, Take 10 mL by mouth 4 (Four) Times a Day As Needed for Congestion, Cough or Allergies., Disp: 240 mL, Rfl: 0  •  doxycycline (VIBRAMYCIN) 100 MG capsule, Take 1 capsule by mouth 2 (Two) Times a Day for 10 days.,  "Disp: 20 capsule, Rfl: 0  •  valACYclovir (VALTREX) 1000 MG tablet, 2 tablets every 12 hours for one day at the first sign of a cold sore, Disp: 12 tablet, Rfl: 3    Allergies   Allergen Reactions   • Penicillins      Review of Systems   Constitutional: Positive for activity change (Missed work). Negative for appetite change, chills, fatigue and fever.   HENT: Positive for congestion and sneezing. Negative for ear pain, sinus pressure, sinus pain and sore throat.    Eyes: Negative for discharge and redness.   Respiratory: Positive for cough. Negative for shortness of breath and wheezing.    Cardiovascular: Negative for chest pain.   Gastrointestinal: Positive for nausea. Negative for vomiting.   Musculoskeletal: Positive for myalgias. Negative for joint pain.   Skin: Negative for color change and rash.   Allergic/Immunologic: Positive for environmental allergies.   Neurological: Negative for dizziness and headaches.   Hematological: Negative for adenopathy.   Psychiatric/Behavioral: Positive for sleep disturbance.     Visit Vitals  /78   Pulse 95   Temp 99 °F (37.2 °C) (Oral)   Resp 14   Ht 182.9 cm (72\")   Wt 104 kg (228 lb 9.6 oz)   SpO2 94%   BMI 31.00 kg/m²     Physical Exam   Constitutional: He is oriented to person, place, and time. He appears well-developed and well-nourished. No distress.   HENT:   Head: Normocephalic.   Right Ear: Ear canal normal. Tympanic membrane is erythematous. Tympanic membrane is not bulging. A middle ear effusion is present.   Left Ear: Ear canal normal. Tympanic membrane is erythematous. Tympanic membrane is not bulging. A middle ear effusion is present.   Nose: Nose normal.   Mouth/Throat: Oropharynx is clear and moist. No oropharyngeal exudate.   Eyes: Conjunctivae are normal. Pupils are equal, round, and reactive to light. Right eye exhibits no discharge. Left eye exhibits no discharge. No scleral icterus.   Neck: Neck supple. No tracheal tenderness present. "   Cardiovascular: Normal rate, regular rhythm and normal heart sounds. Exam reveals no friction rub.   No murmur heard.  Pulmonary/Chest: Effort normal and breath sounds normal. No respiratory distress. He has no wheezes. He has no rales.   Musculoskeletal: He exhibits no edema.   Lymphadenopathy:        Head (right side): No tonsillar adenopathy present.        Head (left side): No tonsillar adenopathy present.     He has no cervical adenopathy.   Neurological: He is alert and oriented to person, place, and time.   Skin: Skin is warm and dry. Capillary refill takes less than 2 seconds. No rash noted. No erythema.   Psychiatric: He has a normal mood and affect. His behavior is normal. Judgment and thought content normal.   Vitals reviewed.    Assessment/Plan   Diagnoses and all orders for this visit:    Bronchitis  -     doxycycline (VIBRAMYCIN) 100 MG capsule; Take 1 capsule by mouth 2 (Two) Times a Day for 10 days.  -     brompheniramine-pseudoephedrine-DM 30-2-10 MG/5ML syrup; Take 10 mL by mouth 4 (Four) Times a Day As Needed for Congestion, Cough or Allergies.    Hypothyroidism due to Hashimoto's thyroiditis  Comments:  TSH ordered. Continue Synthroid 88 mcg  Orders:  -     TSH; Future    Dyslipidemia  Comments:  Lipid panel ordered. Continue lifestyle management  Orders:  -     Lipid Panel; Future    Exposure to communicable disease  Comments:  Labs ordered  Orders:  -     Lyme Disease IgG/IgM Antibodies; Future  -     Alpha Gal IgE; Future  -     CBC & Differential; Future  -     Rickettsial Fever Group IgG / M; Future  -     Babesia Microti, PCR; Future    Non-alcoholic fatty liver disease  Comments:  Lifestyle management including nutrition and exercise for weight loss.   Orders:  -     Comprehensive Metabolic Panel; Future    Prediabetes  Comments:  Labs ordered to assess.   Orders:  -     Comprehensive Metabolic Panel; Future  -     Hemoglobin A1c; Future  -     Vitamin B12; Future  -     MicroAlbumin,  Urine, Random - Urine, Clean Catch; Future    Findings and recommendations discussed with Fawad. Treatment options reviewed. Counseled regarding supportive care measures.   Lifestyle management including nutrition and exercise for weight loss.   Routine labs ordered and additional labs related to his concern for diseases related to tick bites. Follow up with Dr Bowie scheduled for early October.Encouraged him to seek further medical evaluation if symptoms worsen or if any problems/ concerns         This document has been electronically signed by DEMETRICE Moreno, KIYA-BC, FELY

## 2019-08-28 NOTE — PATIENT INSTRUCTIONS
Cough, Adult    A cough helps to clear your throat and lungs. A cough may last only 2-3 weeks (acute), or it may last longer than 8 weeks (chronic). Many different things can cause a cough. A cough may be a sign of an illness or another medical condition.  Follow these instructions at home:  · Pay attention to any changes in your cough.  · Take medicines only as told by your doctor.  ? If you were prescribed an antibiotic medicine, take it as told by your doctor. Do not stop taking it even if you start to feel better.  ? Talk with your doctor before you try using a cough medicine.  · Drink enough fluid to keep your pee (urine) clear or pale yellow.  · If the air is dry, use a cold steam vaporizer or humidifier in your home.  · Stay away from things that make you cough at work or at home.  · If your cough is worse at night, try using extra pillows to raise your head up higher while you sleep.  · Do not smoke, and try not to be around smoke. If you need help quitting, ask your doctor.  · Do not have caffeine.  · Do not drink alcohol.  · Rest as needed.  Contact a doctor if:  · You have new problems (symptoms).  · You cough up yellow fluid (pus).  · Your cough does not get better after 2-3 weeks, or your cough gets worse.  · Medicine does not help your cough and you are not sleeping well.  · You have pain that gets worse or pain that is not helped with medicine.  · You have a fever.  · You are losing weight and you do not know why.  · You have night sweats.  Get help right away if:  · You cough up blood.  · You have trouble breathing.  · Your heartbeat is very fast.  This information is not intended to replace advice given to you by your health care provider. Make sure you discuss any questions you have with your health care provider.  Document Released: 08/30/2012 Document Revised: 05/25/2017 Document Reviewed: 02/24/2016  [employer] Interactive Patient Education © 2019 [employer]  Upper Respiratory Infection,  "Adult  An upper respiratory infection (URI) affects the nose, throat, and upper air passages. URIs are caused by germs (viruses). The most common type of URI is often called \"the common cold.\"  Medicines cannot cure URIs, but you can do things at home to relieve your symptoms. URIs usually get better within 7-10 days.  Follow these instructions at home:  Activity  · Rest as needed.  · If you have a fever, stay home from work or school until your fever is gone, or until your doctor says you may return to work or school.  ? You should stay home until you cannot spread the infection anymore (you are not contagious).  ? Your doctor may have you wear a face mask so you have less risk of spreading the infection.  Relieving symptoms  · Gargle with a salt-water mixture 3-4 times a day or as needed. To make a salt-water mixture, completely dissolve ½-1 tsp of salt in 1 cup of warm water.  · Use a cool-mist humidifier to add moisture to the air. This can help you breathe more easily.  Eating and drinking    · Drink enough fluid to keep your pee (urine) pale yellow.  · Eat soups and other clear broths.  General instructions    · Take over-the-counter and prescription medicines only as told by your doctor. These include cold medicines, fever reducers, and cough suppressants.  · Do not use any products that contain nicotine or tobacco. These include cigarettes and e-cigarettes. If you need help quitting, ask your doctor.  · Avoid being where people are smoking (avoid secondhand smoke).  · Make sure you get regular shots and get the flu shot every year.  · Keep all follow-up visits as told by your doctor. This is important.  How to avoid spreading infection to others    · Wash your hands often with soap and water. If you do not have soap and water, use hand .  · Avoid touching your mouth, face, eyes, or nose.  · Cough or sneeze into a tissue or your sleeve or elbow. Do not cough or sneeze into your hand or into the " "air.  Contact a doctor if:  · You are getting worse, not better.  · You have any of these:  ? A fever.  ? Chills.  ? Brown or red mucus in your nose.  ? Yellow or brown fluid (discharge)coming from your nose.  ? Pain in your face, especially when you bend forward.  ? Swollen neck glands.  ? Pain with swallowing.  ? White areas in the back of your throat.  Get help right away if:  · You have shortness of breath that gets worse.  · You have very bad or constant:  ? Headache.  ? Ear pain.  ? Pain in your forehead, behind your eyes, and over your cheekbones (sinus pain).  ? Chest pain.  · You have long-lasting (chronic) lung disease along with any of these:  ? Wheezing.  ? Long-lasting cough.  ? Coughing up blood.  ? A change in your usual mucus.  · You have a stiff neck.  · You have changes in your:  ? Vision.  ? Hearing.  ? Thinking.  ? Mood.  Summary  · An upper respiratory infection (URI) is caused by a germ called a virus. The most common type of URI is often called \"the common cold.\"  · URIs usually get better within 7-10 days.  · Take over-the-counter and prescription medicines only as told by your doctor.  This information is not intended to replace advice given to you by your health care provider. Make sure you discuss any questions you have with your health care provider.  Document Released: 06/05/2009 Document Revised: 08/10/2018 Document Reviewed: 08/10/2018  [employer] Interactive Patient Education © 2019 [employer]    "

## 2019-10-11 ENCOUNTER — LAB (OUTPATIENT)
Dept: FAMILY MEDICINE CLINIC | Facility: CLINIC | Age: 49
End: 2019-10-11

## 2019-10-11 DIAGNOSIS — E06.3 HYPOTHYROIDISM DUE TO HASHIMOTO'S THYROIDITIS: ICD-10-CM

## 2019-10-11 DIAGNOSIS — E78.5 DYSLIPIDEMIA: Chronic | ICD-10-CM

## 2019-10-11 DIAGNOSIS — K76.0 NON-ALCOHOLIC FATTY LIVER DISEASE: ICD-10-CM

## 2019-10-11 DIAGNOSIS — E03.8 HYPOTHYROIDISM DUE TO HASHIMOTO'S THYROIDITIS: ICD-10-CM

## 2019-10-11 DIAGNOSIS — E06.3 HYPOTHYROIDISM DUE TO HASHIMOTO'S THYROIDITIS: Chronic | ICD-10-CM

## 2019-10-11 DIAGNOSIS — E03.8 HYPOTHYROIDISM DUE TO HASHIMOTO'S THYROIDITIS: Chronic | ICD-10-CM

## 2019-10-11 DIAGNOSIS — Z20.9 EXPOSURE TO COMMUNICABLE DISEASE: ICD-10-CM

## 2019-10-11 DIAGNOSIS — R73.03 PREDIABETES: ICD-10-CM

## 2019-10-11 LAB
ALBUMIN SERPL-MCNC: 4.9 G/DL (ref 3.5–5.2)
ALBUMIN UR-MCNC: <1.2 MG/DL
ALBUMIN/GLOB SERPL: 1.5 G/DL
ALP SERPL-CCNC: 79 U/L (ref 39–117)
ALT SERPL W P-5'-P-CCNC: 34 U/L (ref 1–41)
ANION GAP SERPL CALCULATED.3IONS-SCNC: 12.1 MMOL/L (ref 5–15)
AST SERPL-CCNC: 17 U/L (ref 1–40)
BASOPHILS # BLD AUTO: 0.05 10*3/MM3 (ref 0–0.2)
BASOPHILS NFR BLD AUTO: 0.7 % (ref 0–1.5)
BILIRUB SERPL-MCNC: 0.6 MG/DL (ref 0.2–1.2)
BUN BLD-MCNC: 13 MG/DL (ref 6–20)
BUN/CREAT SERPL: 11.4 (ref 7–25)
CALCIUM SPEC-SCNC: 9.8 MG/DL (ref 8.6–10.5)
CHLORIDE SERPL-SCNC: 102 MMOL/L (ref 98–107)
CHOLEST SERPL-MCNC: 193 MG/DL (ref 0–200)
CO2 SERPL-SCNC: 25.9 MMOL/L (ref 22–29)
CREAT BLD-MCNC: 1.14 MG/DL (ref 0.76–1.27)
DEPRECATED RDW RBC AUTO: 40.6 FL (ref 37–54)
EOSINOPHIL # BLD AUTO: 0.21 10*3/MM3 (ref 0–0.4)
EOSINOPHIL NFR BLD AUTO: 3 % (ref 0.3–6.2)
ERYTHROCYTE [DISTWIDTH] IN BLOOD BY AUTOMATED COUNT: 12.7 % (ref 12.3–15.4)
GFR SERPL CREATININE-BSD FRML MDRD: 68 ML/MIN/1.73
GLOBULIN UR ELPH-MCNC: 3.2 GM/DL
GLUCOSE BLD-MCNC: 99 MG/DL (ref 65–99)
HBA1C MFR BLD: 5.9 % (ref 4.8–5.6)
HCT VFR BLD AUTO: 47.7 % (ref 37.5–51)
HDLC SERPL-MCNC: 39 MG/DL (ref 40–60)
HGB BLD-MCNC: 16 G/DL (ref 13–17.7)
IMM GRANULOCYTES # BLD AUTO: 0.02 10*3/MM3 (ref 0–0.05)
IMM GRANULOCYTES NFR BLD AUTO: 0.3 % (ref 0–0.5)
LDLC SERPL CALC-MCNC: 136 MG/DL (ref 0–100)
LDLC/HDLC SERPL: 3.49 {RATIO}
LYMPHOCYTES # BLD AUTO: 1.98 10*3/MM3 (ref 0.7–3.1)
LYMPHOCYTES NFR BLD AUTO: 28 % (ref 19.6–45.3)
MCH RBC QN AUTO: 29.4 PG (ref 26.6–33)
MCHC RBC AUTO-ENTMCNC: 33.5 G/DL (ref 31.5–35.7)
MCV RBC AUTO: 87.7 FL (ref 79–97)
MONOCYTES # BLD AUTO: 0.61 10*3/MM3 (ref 0.1–0.9)
MONOCYTES NFR BLD AUTO: 8.6 % (ref 5–12)
NEUTROPHILS # BLD AUTO: 4.21 10*3/MM3 (ref 1.7–7)
NEUTROPHILS NFR BLD AUTO: 59.4 % (ref 42.7–76)
NRBC BLD AUTO-RTO: 0 /100 WBC (ref 0–0.2)
PLATELET # BLD AUTO: 281 10*3/MM3 (ref 140–450)
PMV BLD AUTO: 10.9 FL (ref 6–12)
POTASSIUM BLD-SCNC: 4.3 MMOL/L (ref 3.5–5.2)
PROT SERPL-MCNC: 8.1 G/DL (ref 6–8.5)
RBC # BLD AUTO: 5.44 10*6/MM3 (ref 4.14–5.8)
SODIUM BLD-SCNC: 140 MMOL/L (ref 136–145)
TRIGL SERPL-MCNC: 90 MG/DL (ref 0–150)
TSH SERPL DL<=0.05 MIU/L-ACNC: 2.14 UIU/ML (ref 0.27–4.2)
VIT B12 BLD-MCNC: 573 PG/ML (ref 211–946)
VLDLC SERPL-MCNC: 18 MG/DL (ref 5–40)
WBC NRBC COR # BLD: 7.08 10*3/MM3 (ref 3.4–10.8)

## 2019-10-11 PROCEDURE — 80053 COMPREHEN METABOLIC PANEL: CPT | Performed by: GENERAL PRACTICE

## 2019-10-11 PROCEDURE — 82043 UR ALBUMIN QUANTITATIVE: CPT | Performed by: NURSE PRACTITIONER

## 2019-10-11 PROCEDURE — 84443 ASSAY THYROID STIM HORMONE: CPT | Performed by: GENERAL PRACTICE

## 2019-10-11 PROCEDURE — 86757 RICKETTSIA ANTIBODY: CPT | Performed by: NURSE PRACTITIONER

## 2019-10-11 PROCEDURE — 80061 LIPID PANEL: CPT | Performed by: GENERAL PRACTICE

## 2019-10-11 PROCEDURE — 83516 IMMUNOASSAY NONANTIBODY: CPT | Performed by: NURSE PRACTITIONER

## 2019-10-11 PROCEDURE — 82607 VITAMIN B-12: CPT | Performed by: NURSE PRACTITIONER

## 2019-10-11 PROCEDURE — 86753 PROTOZOA ANTIBODY NOS: CPT | Performed by: NURSE PRACTITIONER

## 2019-10-11 PROCEDURE — 83036 HEMOGLOBIN GLYCOSYLATED A1C: CPT | Performed by: GENERAL PRACTICE

## 2019-10-11 PROCEDURE — 86618 LYME DISEASE ANTIBODY: CPT | Performed by: NURSE PRACTITIONER

## 2019-10-11 PROCEDURE — 85025 COMPLETE CBC W/AUTO DIFF WBC: CPT | Performed by: NURSE PRACTITIONER

## 2019-10-12 LAB
B BURGDOR IGG SER QL: NEGATIVE
B BURGDOR IGM SER QL: NEGATIVE

## 2019-10-14 LAB
RICK SF IGG TITR SER IF: NORMAL {TITER}
RICK SF IGM TITR SER IF: NORMAL {TITER}
TYPHUS FEVER GROUP IGG: NORMAL
TYPHUS FEVER GROUP IGM: NORMAL

## 2019-10-15 LAB — B MICROTI DNA BLD QL NAA+PROBE: NEGATIVE

## 2019-10-16 LAB — ALPHA GAL IGE: <0.1 KU/L

## 2019-10-25 ENCOUNTER — OFFICE VISIT (OUTPATIENT)
Dept: FAMILY MEDICINE CLINIC | Facility: CLINIC | Age: 49
End: 2019-10-25

## 2019-10-25 DIAGNOSIS — R73.03 PREDIABETES: ICD-10-CM

## 2019-10-25 DIAGNOSIS — K21.9 GASTROESOPHAGEAL REFLUX DISEASE WITHOUT ESOPHAGITIS: ICD-10-CM

## 2019-10-25 DIAGNOSIS — G43.009 MIGRAINE WITHOUT AURA AND WITHOUT STATUS MIGRAINOSUS, NOT INTRACTABLE: Primary | ICD-10-CM

## 2019-10-25 DIAGNOSIS — J30.2 CHRONIC SEASONAL ALLERGIC RHINITIS: ICD-10-CM

## 2019-10-25 DIAGNOSIS — K76.0 NON-ALCOHOLIC FATTY LIVER DISEASE: ICD-10-CM

## 2019-10-25 DIAGNOSIS — R06.02 SHORTNESS OF BREATH ON EXERTION: ICD-10-CM

## 2019-10-25 DIAGNOSIS — E06.3 HYPOTHYROIDISM DUE TO HASHIMOTO'S THYROIDITIS: ICD-10-CM

## 2019-10-25 DIAGNOSIS — E03.8 HYPOTHYROIDISM DUE TO HASHIMOTO'S THYROIDITIS: ICD-10-CM

## 2019-10-25 DIAGNOSIS — E78.5 DYSLIPIDEMIA: ICD-10-CM

## 2019-10-25 DIAGNOSIS — L91.8 CUTANEOUS SKIN TAGS: ICD-10-CM

## 2019-10-25 DIAGNOSIS — Z00.00 HEALTHCARE MAINTENANCE: ICD-10-CM

## 2019-10-25 PROCEDURE — 99214 OFFICE O/P EST MOD 30 MIN: CPT | Performed by: GENERAL PRACTICE

## 2019-10-25 PROCEDURE — 11200 RMVL SKIN TAGS UP TO&INC 15: CPT | Performed by: GENERAL PRACTICE

## 2019-10-25 RX ORDER — IPRATROPIUM BROMIDE 42 UG/1
2 SPRAY, METERED NASAL 4 TIMES DAILY
Qty: 15 ML | Refills: 5 | Status: SHIPPED | OUTPATIENT
Start: 2019-10-25 | End: 2020-02-10

## 2019-10-25 NOTE — PROGRESS NOTES
Subjective   Fawad Morton is a 49 y.o. male.     History of Present Illness     Dyspnea with Exertion  While he remains short of breath with above average exertion this has been no worse than what he would expect. There's no history of any recent chest pain and he continues to deny any orthopnea, PND, cough, calf pain or swelling of the ankles. Most episodes occur with sudden spurts of significant exertion and he denies any problem with more moderate sustained exertion. CT coronary angiogram done on 3/23/15 was unremarkable as was a stress test performed on 2/11/15    GERD  Patient has a history of of heartburn. Symptoms have been present for a number of years . The patient denies abdominal bloating, dysphagia, dyspepsia, hematemesis, hoarseness, melena, nausea and unexpected weight loss. Symptoms appear to be worsened by large meals and lying down. Risk factors present for GERD include caffeine use. Risk factors absent for GERD are tobacco abuse, alcohol use and NSAID use. Studies performed so far include upper endoscopy, result: positive for reflux esophagitis, upper GI, result: negative, esophageal manometry and ph/imedence: positive for reflux . Treatments tried so far include proton pump inhibitor: omeprazole. Results of treatment: no heartburn however persistent sense of fullness at the back of his throat.     Chronic Allergic Rhinitis  Patient has a history of allergic rhinitis. Patient's symptoms include sneezing, clear rhinorrhea, nasal congestion and postnasal drip. These symptoms are perennial with seasonal exacerbation. The patient has been suffering from these symptoms for a number of years . The patient has tried a number of antihistamines and nasal corticosteroids with a limited relief of symptoms.     Skin Tags  Since last here has developed several right axillary skin tags.  These are itchy at times and occasionally gets caught on clothing.    Hypothyroidism  Patient presents for evaluation of  thyroid function. Symptoms consist of fatigue, weight gain, difficulty concentrating. The symptoms are mild.  The problem has been better since last here. He feels that increased dose of levothyroxine helped but he is also under less stress. Previous thyroid studies include TSH. The hypothyroidism is due to Hashimoto's disease. He remains on levothyroxine.   Lab Results   Component Value Date    TSH 2.140 10/11/2019     Dyslipidemia  Compliance with treatment has been fair. The patient exercises occasionally. He is currently being prescribed the following medication for his dyslipidemia - lifestyle modifcation.   Lab Results   Component Value Date    CHOL 193 10/11/2019    CHLPL 195 01/27/2016    TRIG 90 10/11/2019    HDL 39 (L) 10/11/2019     (H) 10/11/2019     Labs  Most recent fasting glucose 99 with an A1c of 5.9    The following portions of the patient's history were reviewed and updated as appropriate: allergies, current medications, past medical history, past social history and problem list.    Review of Systems   Constitutional: Positive for fatigue. Negative for appetite change, chills, fever and unexpected weight change.   HENT: Positive for postnasal drip and rhinorrhea. Negative for congestion, ear pain, sneezing, sore throat and voice change.         Sense of fullness back of throat.  Occasional cold sores   Eyes: Negative for visual disturbance.   Respiratory: Positive for shortness of breath. Negative for cough and wheezing.    Cardiovascular: Negative for chest pain, palpitations and leg swelling.   Gastrointestinal: Negative for abdominal pain, blood in stool, constipation, diarrhea, nausea and vomiting.   Endocrine: Negative for polydipsia and polyuria.   Genitourinary: Negative for difficulty urinating, dysuria, frequency, hematuria and urgency.        Nocturia - chronic - 1-2 times nightly   Musculoskeletal: Negative for arthralgias, back pain, joint swelling, myalgias and neck pain.    Skin: Negative for color change.   Neurological: Negative for tremors, weakness, numbness and headaches.   Psychiatric/Behavioral: Negative for decreased concentration, dysphoric mood, sleep disturbance and suicidal ideas. The patient is not nervous/anxious.      Objective   Physical Exam   Constitutional: He is oriented to person, place, and time. He appears well-developed and well-nourished. He is cooperative. He does not have a sickly appearance. No distress.   Bright and in good spirits. No apparent distress. Mild truncal obesity. No pallor, jaundice, diaphoresis, or cyanosis.   HENT:   Head: Atraumatic.   Right Ear: Tympanic membrane, external ear and ear canal normal.   Left Ear: Tympanic membrane, external ear and ear canal normal.   Mouth/Throat: Oropharynx is clear and moist.   Eyes: EOM are normal. Pupils are equal, round, and reactive to light. No scleral icterus.   Neck: No JVD present. Carotid bruit is not present. No tracheal deviation present. No thyromegaly present.   Cardiovascular: Normal rate, regular rhythm, S1 normal, S2 normal, normal heart sounds and intact distal pulses. Exam reveals no gallop.   No murmur heard.  Pulmonary/Chest: Breath sounds normal. He has no wheezes. He has no rales.   Abdominal: Soft. Normal aorta and bowel sounds are normal. He exhibits no abdominal bruit and no mass. There is no hepatosplenomegaly. There is no tenderness. No hernia.   Musculoskeletal: He exhibits no tenderness or deformity.   Lymphadenopathy:        Head (right side): No submandibular adenopathy present.        Head (left side): No submandibular adenopathy present.     He has no cervical adenopathy.   Neurological: He is alert and oriented to person, place, and time. He has normal strength and normal reflexes. He displays normal reflexes. No cranial nerve deficit. He exhibits normal muscle tone. Coordination normal.   Skin: Skin is warm and dry. Lesion (two 3-5 mm skin colored pedunculated papules  right anterior axilla) noted. No rash noted. He is not diaphoretic. No pallor. Nails show no clubbing.   Psychiatric: He has a normal mood and affect. His behavior is normal.     Assessment/Plan   Problems Addressed this Visit        Cardiovascular and Mediastinum    Migraine without aura and without status migrainosus, not intractable       Respiratory    Chronic seasonal allergic rhinitis  And/or vasomotor rhinitis  Limited response to oral antihistamines and nasal corticosteroids  Reviewed options and agreed on a trial of ipratropium nasal spray    Relevant Medications    ipratropium (ATROVENT) 0.06 % nasal spray    Shortness of breath on exertion  Deconditioning.  No concerning features on history or physical exam  Encouraged to continue to work on his diet and exercise plan.       Digestive    Non-alcoholic fatty liver disease    Gastroesophageal reflux disease without esophagitis   Symptoms are currently stable.  Reminded regarding lifestyle modification.       Endocrine    Hypothyroidism due to Hashimoto's thyroiditis  Clinically and bio-chemically euthyroid.  Continue current medication.       Musculoskeletal and Integument    Cutaneous skin tags  Reviewed treatment options and agreed on a trial of cryotherapy. See procedure note.       Other    Prediabetes  As above.  Will continue to monitor    Dyslipidemia  As above.    Healthcare maintenance  Patient remains uninterested in a flu shot  Advised of the potential benefits of colon cancer screening and the current options with respect to this (including Cologuard).  Patient will consider

## 2019-10-25 NOTE — PROGRESS NOTES
Procedures  Cryotherapy Procedure Note    Pre-operative Diagnosis: Skin tags x 2    Post-operative Diagnosis: Same    Locations: Right anterior axilla    Indications: Ablation for symptoms    Procedure Details   Patient informed of risks (permanent scarring, infection, light or dark discoloration, bleeding, infection, weakness, numbness and recurrence of the lesion) and benefits of the procedure and verbal informed consent obtained.    The areas are treated with liquid nitrogen therapy, frozen until ice ball extended 2 mm beyond lesion, allowed to thaw, and treated again. The patient tolerated procedure well.  The patient was instructed on post-op care, warned that there may be blister formation, redness and pain. Recommend OTC analgesia as needed for pain.    Condition:  Stable    Complications:  None.    Plan:  1. Instructed to keep the area dry and covered for 24-48h and clean thereafter.  2. Warning signs of infection were reviewed.    3. Recommended that the patient use OTC acetaminophen as needed for pain.   4. Return if any concerns

## 2019-10-26 VITALS
HEIGHT: 72 IN | TEMPERATURE: 98.3 F | HEART RATE: 102 BPM | OXYGEN SATURATION: 97 % | SYSTOLIC BLOOD PRESSURE: 125 MMHG | RESPIRATION RATE: 12 BRPM | DIASTOLIC BLOOD PRESSURE: 70 MMHG | BODY MASS INDEX: 31.15 KG/M2 | WEIGHT: 230 LBS

## 2019-10-26 PROBLEM — M72.2 PLANTAR FASCIITIS, BILATERAL: Status: RESOLVED | Noted: 2018-05-11 | Resolved: 2019-10-26

## 2019-12-20 DIAGNOSIS — B00.1 RECURRENT COLD SORES: ICD-10-CM

## 2019-12-20 RX ORDER — VALACYCLOVIR HYDROCHLORIDE 1 G/1
TABLET, FILM COATED ORAL
Qty: 12 TABLET | Refills: 3 | Status: SHIPPED | OUTPATIENT
Start: 2019-12-20 | End: 2021-09-24 | Stop reason: SDUPTHER

## 2020-02-10 ENCOUNTER — OFFICE VISIT (OUTPATIENT)
Dept: FAMILY MEDICINE CLINIC | Facility: CLINIC | Age: 50
End: 2020-02-10

## 2020-02-10 VITALS
DIASTOLIC BLOOD PRESSURE: 84 MMHG | SYSTOLIC BLOOD PRESSURE: 130 MMHG | HEIGHT: 72 IN | WEIGHT: 239 LBS | OXYGEN SATURATION: 96 % | BODY MASS INDEX: 32.37 KG/M2 | HEART RATE: 97 BPM | TEMPERATURE: 98.4 F

## 2020-02-10 DIAGNOSIS — R68.89 FLU-LIKE SYMPTOMS: Primary | ICD-10-CM

## 2020-02-10 PROCEDURE — 99213 OFFICE O/P EST LOW 20 MIN: CPT | Performed by: PHYSICIAN ASSISTANT

## 2020-02-10 RX ORDER — ALBUTEROL SULFATE 90 UG/1
2 AEROSOL, METERED RESPIRATORY (INHALATION) EVERY 4 HOURS PRN
Qty: 1 INHALER | Refills: 5 | Status: SHIPPED | OUTPATIENT
Start: 2020-02-10 | End: 2021-05-28

## 2020-02-10 NOTE — PROGRESS NOTES
"Subjective   Fawad Morton is a 49 y.o. male.       Chief Complaint -fatigue    History of Present Illness -       Fatigue-  He complains of sudden onset of fatigue dry cough and congestion that began 1 week ago.  Minimal relief with Tylenol.    Influenza testing in office today was negative    The following portions of the patient's history were reviewed and updated as appropriate: allergies, current medications, past family history, past medical history, past social history, past surgical history and problem list.    Review of Systems   Constitutional: Positive for activity change, appetite change and fatigue. Negative for fever.   HENT: Positive for congestion. Negative for ear pain, sinus pressure and sore throat.    Eyes: Negative for pain and visual disturbance.   Respiratory: Positive for cough and shortness of breath. Negative for chest tightness and wheezing.    Cardiovascular: Negative for chest pain and palpitations.   Gastrointestinal: Negative for abdominal pain, constipation, diarrhea, nausea and vomiting.   Endocrine: Negative for polydipsia and polyuria.   Genitourinary: Negative for dysuria and frequency.   Musculoskeletal: Negative for back pain and myalgias.   Skin: Negative for color change and rash.   Allergic/Immunologic: Negative for food allergies and immunocompromised state.   Neurological: Negative for dizziness, syncope and headaches.   Hematological: Negative for adenopathy. Does not bruise/bleed easily.   Psychiatric/Behavioral: Negative for hallucinations and suicidal ideas. The patient is not nervous/anxious.        /84   Pulse 97   Temp 98.4 °F (36.9 °C) (Oral)   Ht 182.9 cm (72.01\")   Wt 108 kg (239 lb)   SpO2 96%   BMI 32.41 kg/m²     Physical Exam   Constitutional: He is oriented to person, place, and time. He appears well-developed and well-nourished. No distress.   HENT:   Head: Normocephalic and atraumatic.   Right Ear: Tympanic membrane, external ear and ear canal " normal.   Left Ear: Tympanic membrane, external ear and ear canal normal.   Mouth/Throat: Oropharynx is clear and moist.   Neck: Normal range of motion. Neck supple. No thyromegaly present.   Cardiovascular: Normal rate, regular rhythm and normal heart sounds.   No murmur heard.  Pulmonary/Chest: Effort normal and breath sounds normal. He has no wheezes. He has no rales.   Lymphadenopathy:     He has no cervical adenopathy.   Neurological: He is alert and oriented to person, place, and time.   Skin: Skin is warm and dry. No rash noted. He is not diaphoretic.   Psychiatric: He has a normal mood and affect. His behavior is normal.   Nursing note and vitals reviewed.      Assessment/Plan     Diagnoses and all orders for this visit:    Flu-like symptoms  -     albuterol sulfate  (90 Base) MCG/ACT inhaler; Inhale 2 puffs Every 4 (Four) Hours As Needed for Shortness of Air.    Viral upper respiratory tract infection  Advised regarding symptomatic treatment.  Discussed the importance of avoiding unnecessary antibiotic therapy.  Suggested OTC remedies.  Encouraged to report if any worse or if any new symptoms.  Call in 4 days if symptoms aren't resolving.            This document has been electronically signed by:  Humaira Ferrer PA-C

## 2020-04-29 DIAGNOSIS — E03.8 HYPOTHYROIDISM DUE TO HASHIMOTO'S THYROIDITIS: ICD-10-CM

## 2020-04-29 DIAGNOSIS — E06.3 HYPOTHYROIDISM DUE TO HASHIMOTO'S THYROIDITIS: ICD-10-CM

## 2020-04-29 RX ORDER — LEVOTHYROXINE SODIUM 88 MCG
88 TABLET ORAL DAILY
Qty: 90 TABLET | Refills: 3 | Status: SHIPPED | OUTPATIENT
Start: 2020-04-29 | End: 2021-02-26 | Stop reason: SDUPTHER

## 2021-02-23 ENCOUNTER — LAB (OUTPATIENT)
Dept: FAMILY MEDICINE CLINIC | Facility: CLINIC | Age: 51
End: 2021-02-23

## 2021-02-23 DIAGNOSIS — K76.0 NON-ALCOHOLIC FATTY LIVER DISEASE: ICD-10-CM

## 2021-02-23 DIAGNOSIS — R06.02 SHORTNESS OF BREATH ON EXERTION: ICD-10-CM

## 2021-02-23 DIAGNOSIS — E06.3 HYPOTHYROIDISM DUE TO HASHIMOTO'S THYROIDITIS: Primary | ICD-10-CM

## 2021-02-23 DIAGNOSIS — K21.9 GASTROESOPHAGEAL REFLUX DISEASE WITHOUT ESOPHAGITIS: ICD-10-CM

## 2021-02-23 DIAGNOSIS — Z00.00 HEALTHCARE MAINTENANCE: ICD-10-CM

## 2021-02-23 DIAGNOSIS — E78.5 DYSLIPIDEMIA: ICD-10-CM

## 2021-02-23 DIAGNOSIS — E53.8 B12 DEFICIENCY: ICD-10-CM

## 2021-02-23 DIAGNOSIS — E03.8 HYPOTHYROIDISM DUE TO HASHIMOTO'S THYROIDITIS: Primary | ICD-10-CM

## 2021-02-23 DIAGNOSIS — R73.03 PREDIABETES: ICD-10-CM

## 2021-02-23 DIAGNOSIS — E29.1 TESTOSTERONE DEFICIENCY IN MALE: ICD-10-CM

## 2021-02-23 LAB
ALBUMIN SERPL-MCNC: 4.2 G/DL (ref 3.5–5.2)
ALBUMIN/GLOB SERPL: 1.6 G/DL
ALP SERPL-CCNC: 71 U/L (ref 39–117)
ALT SERPL W P-5'-P-CCNC: 31 U/L (ref 1–41)
ANION GAP SERPL CALCULATED.3IONS-SCNC: 9.4 MMOL/L (ref 5–15)
AST SERPL-CCNC: 18 U/L (ref 1–40)
BASOPHILS # BLD AUTO: 0.05 10*3/MM3 (ref 0–0.2)
BASOPHILS NFR BLD AUTO: 0.8 % (ref 0–1.5)
BILIRUB SERPL-MCNC: 0.4 MG/DL (ref 0–1.2)
BUN SERPL-MCNC: 14 MG/DL (ref 6–20)
BUN/CREAT SERPL: 14.4 (ref 7–25)
CALCIUM SPEC-SCNC: 9.5 MG/DL (ref 8.6–10.5)
CHLORIDE SERPL-SCNC: 104 MMOL/L (ref 98–107)
CHOLEST SERPL-MCNC: 172 MG/DL (ref 0–200)
CO2 SERPL-SCNC: 25.6 MMOL/L (ref 22–29)
CREAT SERPL-MCNC: 0.97 MG/DL (ref 0.76–1.27)
DEPRECATED RDW RBC AUTO: 41.4 FL (ref 37–54)
EOSINOPHIL # BLD AUTO: 0.21 10*3/MM3 (ref 0–0.4)
EOSINOPHIL NFR BLD AUTO: 3.5 % (ref 0.3–6.2)
ERYTHROCYTE [DISTWIDTH] IN BLOOD BY AUTOMATED COUNT: 13.1 % (ref 12.3–15.4)
GFR SERPL CREATININE-BSD FRML MDRD: 82 ML/MIN/1.73
GLOBULIN UR ELPH-MCNC: 2.6 GM/DL
GLUCOSE SERPL-MCNC: 98 MG/DL (ref 65–99)
HBA1C MFR BLD: 6.2 % (ref 4.8–5.6)
HCT VFR BLD AUTO: 46.4 % (ref 37.5–51)
HDLC SERPL-MCNC: 35 MG/DL (ref 40–60)
HGB BLD-MCNC: 15.6 G/DL (ref 13–17.7)
IMM GRANULOCYTES # BLD AUTO: 0.02 10*3/MM3 (ref 0–0.05)
IMM GRANULOCYTES NFR BLD AUTO: 0.3 % (ref 0–0.5)
LDLC SERPL CALC-MCNC: 115 MG/DL (ref 0–100)
LDLC/HDLC SERPL: 3.22 {RATIO}
LYMPHOCYTES # BLD AUTO: 2.04 10*3/MM3 (ref 0.7–3.1)
LYMPHOCYTES NFR BLD AUTO: 33.9 % (ref 19.6–45.3)
MCH RBC QN AUTO: 29.3 PG (ref 26.6–33)
MCHC RBC AUTO-ENTMCNC: 33.6 G/DL (ref 31.5–35.7)
MCV RBC AUTO: 87.2 FL (ref 79–97)
MONOCYTES # BLD AUTO: 0.52 10*3/MM3 (ref 0.1–0.9)
MONOCYTES NFR BLD AUTO: 8.7 % (ref 5–12)
NEUTROPHILS NFR BLD AUTO: 3.17 10*3/MM3 (ref 1.7–7)
NEUTROPHILS NFR BLD AUTO: 52.8 % (ref 42.7–76)
NRBC BLD AUTO-RTO: 0 /100 WBC (ref 0–0.2)
PLATELET # BLD AUTO: 279 10*3/MM3 (ref 140–450)
PMV BLD AUTO: 11.2 FL (ref 6–12)
POTASSIUM SERPL-SCNC: 4.3 MMOL/L (ref 3.5–5.2)
PROT SERPL-MCNC: 6.8 G/DL (ref 6–8.5)
RBC # BLD AUTO: 5.32 10*6/MM3 (ref 4.14–5.8)
SODIUM SERPL-SCNC: 139 MMOL/L (ref 136–145)
TRIGL SERPL-MCNC: 122 MG/DL (ref 0–150)
TSH SERPL DL<=0.05 MIU/L-ACNC: 1.39 UIU/ML (ref 0.27–4.2)
VIT B12 BLD-MCNC: 617 PG/ML (ref 211–946)
VLDLC SERPL-MCNC: 22 MG/DL (ref 5–40)
WBC # BLD AUTO: 6.01 10*3/MM3 (ref 3.4–10.8)

## 2021-02-23 PROCEDURE — 84403 ASSAY OF TOTAL TESTOSTERONE: CPT | Performed by: GENERAL PRACTICE

## 2021-02-23 PROCEDURE — 84402 ASSAY OF FREE TESTOSTERONE: CPT | Performed by: GENERAL PRACTICE

## 2021-02-23 PROCEDURE — 80053 COMPREHEN METABOLIC PANEL: CPT

## 2021-02-23 PROCEDURE — 80061 LIPID PANEL: CPT

## 2021-02-23 PROCEDURE — 82607 VITAMIN B-12: CPT

## 2021-02-23 PROCEDURE — 84443 ASSAY THYROID STIM HORMONE: CPT

## 2021-02-23 PROCEDURE — 85025 COMPLETE CBC W/AUTO DIFF WBC: CPT

## 2021-02-23 PROCEDURE — 83036 HEMOGLOBIN GLYCOSYLATED A1C: CPT

## 2021-02-26 ENCOUNTER — OFFICE VISIT (OUTPATIENT)
Dept: FAMILY MEDICINE CLINIC | Facility: CLINIC | Age: 51
End: 2021-02-26

## 2021-02-26 DIAGNOSIS — M79.642 LEFT HAND PAIN: ICD-10-CM

## 2021-02-26 DIAGNOSIS — R73.03 PREDIABETES: ICD-10-CM

## 2021-02-26 DIAGNOSIS — K76.0 NON-ALCOHOLIC FATTY LIVER DISEASE: ICD-10-CM

## 2021-02-26 DIAGNOSIS — K21.9 GASTROESOPHAGEAL REFLUX DISEASE WITHOUT ESOPHAGITIS: ICD-10-CM

## 2021-02-26 DIAGNOSIS — E66.9 CLASS 1 OBESITY WITH SERIOUS COMORBIDITY AND BODY MASS INDEX (BMI) OF 32.0 TO 32.9 IN ADULT, UNSPECIFIED OBESITY TYPE: ICD-10-CM

## 2021-02-26 DIAGNOSIS — E06.3 HYPOTHYROIDISM DUE TO HASHIMOTO'S THYROIDITIS: ICD-10-CM

## 2021-02-26 DIAGNOSIS — Z00.00 HEALTHCARE MAINTENANCE: ICD-10-CM

## 2021-02-26 DIAGNOSIS — G43.009 MIGRAINE WITHOUT AURA AND WITHOUT STATUS MIGRAINOSUS, NOT INTRACTABLE: ICD-10-CM

## 2021-02-26 DIAGNOSIS — R06.02 SHORTNESS OF BREATH ON EXERTION: ICD-10-CM

## 2021-02-26 DIAGNOSIS — E03.8 HYPOTHYROIDISM DUE TO HASHIMOTO'S THYROIDITIS: ICD-10-CM

## 2021-02-26 DIAGNOSIS — J30.2 CHRONIC SEASONAL ALLERGIC RHINITIS: Primary | ICD-10-CM

## 2021-02-26 DIAGNOSIS — E78.5 DYSLIPIDEMIA: ICD-10-CM

## 2021-02-26 PROBLEM — E66.811 CLASS 1 OBESITY WITH SERIOUS COMORBIDITY AND BODY MASS INDEX (BMI) OF 30.0 TO 30.9 IN ADULT: Status: ACTIVE | Noted: 2021-02-26

## 2021-02-26 LAB
TESTOST FREE SERPL-MCNC: 10.5 PG/ML (ref 7.2–24)
TESTOST SERPL-MCNC: 390.9 NG/DL (ref 264–916)

## 2021-02-26 PROCEDURE — 99396 PREV VISIT EST AGE 40-64: CPT | Performed by: GENERAL PRACTICE

## 2021-02-26 RX ORDER — METFORMIN HYDROCHLORIDE 500 MG/1
TABLET, EXTENDED RELEASE ORAL
Qty: 30 TABLET | Refills: 5 | Status: SHIPPED | OUTPATIENT
Start: 2021-02-26 | End: 2021-05-28

## 2021-02-26 RX ORDER — PHENTERMINE HYDROCHLORIDE 37.5 MG/1
TABLET ORAL
Qty: 30 TABLET | Refills: 2 | Status: SHIPPED | OUTPATIENT
Start: 2021-02-26 | End: 2021-05-28

## 2021-02-26 RX ORDER — LEVOTHYROXINE SODIUM 88 MCG
88 TABLET ORAL DAILY
Qty: 90 TABLET | Refills: 3 | Status: SHIPPED | OUTPATIENT
Start: 2021-02-26 | End: 2021-09-24 | Stop reason: SDUPTHER

## 2021-02-26 NOTE — PROGRESS NOTES
Subjective   Fawad Morton is a 51 y.o. male.     History of Present Illness     Dyspnea with Exertion  While he remains short of breath with above average exertion this has been no worse than what he would expect. There's no history of any recent chest pain and he continues to deny any orthopnea, PND, cough, calf pain or swelling of the ankles. Most episodes occur with sudden spurts of significant exertion and he denies any problem with more moderate sustained exertion. CT coronary angiogram done on 3/23/15 was unremarkable as was a stress test performed on 2/11/15  Lab Results   Component Value Date    WBC 6.01 02/23/2021    HGB 15.6 02/23/2021    HCT 46.4 02/23/2021    MCV 87.2 02/23/2021     02/23/2021     GERD  Patient has a history of of heartburn. Symptoms have been present for a number of years . The patient denies abdominal bloating, dysphagia, dyspepsia, hematemesis, hoarseness, melena, nausea and unexpected weight loss. Symptoms appear to be worsened by large meals and lying down. Risk factors present for GERD include caffeine use. Risk factors absent for GERD are tobacco abuse, alcohol use and NSAID use. Studies performed so far include upper endoscopy, result: positive for reflux esophagitis, upper GI, result: negative, esophageal manometry and ph/imedence: positive for reflux . Treatments tried so far include proton pump inhibitor: omeprazole. Results of treatment: no heartburn however persistent sense of fullness at the back of his throat.     Chronic Allergic Rhinitis  Patient has a history of allergic rhinitis. Patient's symptoms include sneezing, clear rhinorrhea, nasal congestion and postnasal drip. These symptoms are perennial with seasonal exacerbation. The patient has been suffering from these symptoms for a number of years . The patient has tried a number of antihistamines and nasal corticosteroids with a limited relief of symptoms.     Skin Tags  Since last here has developed several  right axillary skin tags.  These are itchy at times and occasionally gets caught on clothing.    Hypothyroidism  Patient presents for evaluation of thyroid function. Symptoms consist of fatigue, weight gain, difficulty concentrating. The symptoms are mild.  The problem has been better since last here. He feels that increased dose of levothyroxine helped but he is also under less stress. Previous thyroid studies include TSH. The hypothyroidism is due to Hashimoto's disease. He remains on levothyroxine.   Lab Results   Component Value Date    TSH 1.390 02/23/2021     Dyslipidemia  Compliance with treatment has been fair. The patient exercises occasionally. He is currently being prescribed the following medication for his dyslipidemia - lifestyle modifcation.   Lab Results   Component Value Date    CHOL 172 02/23/2021    CHLPL 195 01/27/2016    TRIG 122 02/23/2021    HDL 35 (L) 02/23/2021     (H) 02/23/2021     Labs  Lab Results   Component Value Date    GLUCOSE 98 02/23/2021    CALCIUM 9.5 02/23/2021     02/23/2021    K 4.3 02/23/2021    CO2 25.6 02/23/2021     02/23/2021    BUN 14 02/23/2021    CREATININE 0.97 02/23/2021    EGFRIFNONA 82 02/23/2021    BCR 14.4 02/23/2021    ANIONGAP 9.4 02/23/2021     Lab Results   Component Value Date    HGBA1C 6.20 (H) 02/23/2021     Lab Results   Component Value Date    FOVJAEOO55 617 02/23/2021     Lab Results   Component Value Date    TESTOSTERONE 390.9 02/23/2021     {Common H&P Review Areas:59641}    Review of Systems    Objective   Physical Exam    Assessment/Plan   {Assess/PlanSmartLinks:21987}

## 2021-02-27 VITALS
HEIGHT: 72 IN | TEMPERATURE: 97.1 F | SYSTOLIC BLOOD PRESSURE: 122 MMHG | DIASTOLIC BLOOD PRESSURE: 65 MMHG | BODY MASS INDEX: 32.1 KG/M2 | RESPIRATION RATE: 12 BRPM | HEART RATE: 91 BPM | WEIGHT: 237 LBS | OXYGEN SATURATION: 92 %

## 2021-02-27 PROBLEM — B00.1 RECURRENT COLD SORES: Status: RESOLVED | Noted: 2018-11-12 | Resolved: 2021-02-27

## 2021-02-27 PROBLEM — Z23 ENCOUNTER FOR IMMUNIZATION: Status: RESOLVED | Noted: 2017-11-10 | Resolved: 2021-02-27

## 2021-02-27 NOTE — PROGRESS NOTES
Annual Wellness Visit    Subjective   History of Present Illness:  Fawad Morton is a 51 y.o. male who presents for a Wellness Visit.    Dyspnea with Exertion  While he remains short of breath with above average exertion this has remained stable and has been unassociated with any chest pain, orthopnea, PND, cough, calf pain or swelling of the ankles. Most episodes occur with sudden spurts of significant exertion and he denies any problem with more moderate sustained exertion. CT coronary angiogram done on 3/23/15 was unremarkable as was a stress test performed on 2/11/15  Lab Results   Component Value Date    WBC 6.01 02/23/2021    HGB 15.6 02/23/2021    HCT 46.4 02/23/2021    MCV 87.2 02/23/2021     02/23/2021     GERD  Patient has a history of of heartburn. Symptoms have been present for a number of years . The patient denies abdominal bloating, dysphagia, dyspepsia, hematemesis, hoarseness, melena, nausea and unexpected weight loss. Symptoms appear to be worsened by large meals and lying down. Risk factors present for GERD include caffeine use. Risk factors absent for GERD are tobacco abuse, alcohol use and NSAID use. Studies performed so far include upper endoscopy, result: positive for reflux esophagitis, upper GI, result: negative, esophageal manometry and ph/imedence: positive for reflux . Treatments tried so far include proton pump inhibitor: omeprazole. Results of treatment: no heartburn however he continues to have a sense of fullness at the back of his throat.     Chronic Allergic Rhinitis  Patient has a history of allergic rhinitis. Patient's symptoms include sneezing, clear rhinorrhea, nasal congestion and postnasal drip. These symptoms are perennial with seasonal exacerbation. The patient has been suffering from these symptoms for a number of years . The patient has tried a number of antihistamines and nasal corticosteroids with a limited relief of symptoms.     Hypothyroidism  Patient presents  for evaluation of thyroid function. Symptoms consist of fatigue, weight gain, difficulty concentrating. The symptoms are moderate. The hypothyroidism is due to Hashimoto's disease. He remains on levothyroxine 88 qd.   Lab Results   Component Value Date    TSH 1.390 02/23/2021     Dyslipidemia  Compliance with treatment has been fair. The patient exercises occasionally. He is currently being prescribed the following medication for his dyslipidemia - lifestyle modifcation.   Lab Results   Component Value Date    CHOL 172 02/23/2021    CHLPL 195 01/27/2016    TRIG 122 02/23/2021    HDL 35 (L) 02/23/2021     (H) 02/23/2021     Labs  Lab Results   Component Value Date    GLUCOSE 98 02/23/2021    CALCIUM 9.5 02/23/2021     02/23/2021    K 4.3 02/23/2021    CO2 25.6 02/23/2021     02/23/2021    BUN 14 02/23/2021    CREATININE 0.97 02/23/2021    EGFRIFNONA 82 02/23/2021    BCR 14.4 02/23/2021    ANIONGAP 9.4 02/23/2021     Lab Results   Component Value Date    HGBA1C 6.20 (H) 02/23/2021     Lab Results   Component Value Date    EYIEUHZM43 617 02/23/2021     Lab Results   Component Value Date    TESTOSTERONE 390.9 02/23/2021     HEALTH RISK ASSESSMENT    Recent Hospitalizations:  No hospitalization(s) within the last year.    Current Medical Providers:  Patient Care Team:  Ben Bowie MD as PCP - General  Ben Bowie MD as PCP - Family Medicine    Smoking Status:  Social History     Tobacco Use   Smoking Status Former Smoker   Smokeless Tobacco Never Used     Alcohol Consumption:  Social History     Substance and Sexual Activity   Alcohol Use Yes   • Alcohol/week: 1.0 standard drinks   • Types: 1 Cans of beer per week   • Frequency: 2-3 times a week     Depression Screen:   PHQ-2/PHQ-9 Depression Screening 2/26/2021   Little interest or pleasure in doing things 0   Feeling down, depressed, or hopeless 0   Trouble falling or staying asleep, or sleeping too much 0   Feeling tired or  having little energy 0   Poor appetite or overeating 0   Feeling bad about yourself - or that you are a failure or have let yourself or your family down 0   Trouble concentrating on things, such as reading the newspaper or watching television 0   Moving or speaking so slowly that other people could have noticed. Or the opposite - being so fidgety or restless that you have been moving around a lot more than usual 0   Thoughts that you would be better off dead, or of hurting yourself in some way 0   Total Score 0   If you checked off any problems, how difficult have these problems made it for you to do your work, take care of things at home, or get along with other people? Not difficult at all     Fall Risk Screen:  Fall Risk Assessment was completed, and patient is at low risk for falls.    Health Habits and Functional and Cognitive Screening:  Functional & Cognitive Status 2/26/2021   Do you have difficulty preparing food and eating? No   Do you have difficulty bathing yourself, getting dressed or grooming yourself? No   Do you have difficulty using the toilet? No   Do you have difficulty moving around from place to place? No   Do you have trouble with steps or getting out of a bed or a chair? No   Current Diet Well Balanced Diet   Dental Exam Not up to date   Eye Exam Not up to date   Exercise (times per week) 0 times per week   Current Exercises Include No Regular Exercise   Do you need help using the phone?  No   Are you deaf or do you have serious difficulty hearing?  No   Do you need help with transportation? No   Do you need help shopping? No   Do you need help preparing meals?  No   Do you need help with housework?  No   Do you need help with laundry? No   Do you need help taking your medications? No   Do you need help managing money? No   Do you ever drive or ride in a car without wearing a seat belt? No   Have you felt unusual stress, anger or loneliness in the last month? No   Who do you live with? Spouse    If you need help, do you have trouble finding someone available to you? No   Have you been bothered in the last four weeks by sexual problems? No   Do you have difficulty concentrating, remembering or making decisions? No     Does the patient have evidence of cognitive impairment? No    Asprin use counseling:Does not need ASA (and currently is not on it)    Age-appropriate Screening Schedule:  Refer to the list below for future screening recommendations based on patient's age, sex and/or medical conditions. Orders for these recommended tests are listed in the plan section. The patient has been provided with a written plan.    Health Maintenance   Topic Date Due   • COLONOSCOPY  1970   • ZOSTER VACCINE (1 of 2) 02/23/2020   • INFLUENZA VACCINE  08/01/2020   • TDAP/TD VACCINES (2 - Td) 05/06/2021   • LIPID PANEL  02/23/2022          The following portions of the patient's history were reviewed and updated as appropriate: allergies, current medications, past family history, past medical history, past social history, past surgical history and problem list.    Outpatient Medications Prior to Visit   Medication Sig Dispense Refill   • albuterol sulfate  (90 Base) MCG/ACT inhaler Inhale 2 puffs Every 4 (Four) Hours As Needed for Shortness of Air. 1 inhaler 5   • valACYclovir (VALTREX) 1000 MG tablet 2 tablets every 12 hours for one day at the first sign of a cold sore 12 tablet 3   • SYNTHROID 88 MCG tablet Take 1 tablet by mouth Daily. 90 tablet 3     No facility-administered medications prior to visit.        Patient Active Problem List   Diagnosis   • Non-alcoholic fatty liver disease   • Prediabetes   • Dyslipidemia   • Hypothyroidism due to Hashimoto's thyroiditis   • Chronic seasonal allergic rhinitis   • Gastroesophageal reflux disease without esophagitis   • Mechanical low back pain   • Migraine without aura and without status migrainosus, not intractable   • Healthcare maintenance   • Shortness of  breath on exertion   • Nocturia   • Class 1 obesity with serious comorbidity and body mass index (BMI) of 32.0 to 32.9 in adult   • Left hand pain     Advanced Care Planning:  ACP discussion was held with the patient during this visit. Patient does not have an advance directive, information provided.    Review of Systems   Constitutional: Positive for fatigue. Negative for appetite change, chills, fever and unexpected weight change.   HENT: Positive for postnasal drip and rhinorrhea. Negative for congestion, ear pain, sneezing and sore throat.         Sense of fullness back of throat.  Occasional cold sores   Eyes: Negative for visual disturbance.   Respiratory: Positive for shortness of breath. Negative for cough and wheezing.    Cardiovascular: Negative for chest pain, palpitations and leg swelling.   Gastrointestinal: Negative for abdominal pain, blood in stool, constipation, diarrhea, nausea and vomiting.   Endocrine: Negative for polydipsia and polyuria.   Genitourinary: Negative for difficulty urinating, dysuria, frequency, hematuria and urgency.        Nocturia x 1-2   Musculoskeletal: Positive for arthralgias (base of left thumb - over the last year). Negative for back pain, joint swelling, myalgias and neck pain.   Skin: Negative for rash.   Neurological: Negative for tremors, weakness, numbness and headaches.   Psychiatric/Behavioral: Positive for decreased concentration. Negative for dysphoric mood, sleep disturbance and suicidal ideas. The patient is not nervous/anxious.      Compared to one year ago, the patient feels his physical health is the same.  Compared to one year ago, the patient feels his mental health is the same.    Reviewed chart for potential of high risk medication in the elderly: not applicable  Reviewed chart for potential of harmful drug interactions in the elderly:not applicable    Objective      Vitals:    02/26/21 1321   BP: 122/65   Pulse: 91   Resp: 12   Temp: 97.1 °F (36.2 °C)  "  TempSrc: Temporal   SpO2: 92%   Weight: 108 kg (237 lb)   Height: 182.9 cm (72\")       Body mass index is 32.14 kg/m².  Discussed the patient's BMI with him. The BMI is above average; BMI management plan is completed.    Physical Exam  Constitutional:       General: He is not in acute distress.     Appearance: Normal appearance. He is well-developed. He is not ill-appearing or diaphoretic.      Comments: Bright and in fair spirits. No apparent distress. No pallor, jaundice, diaphoresis, or cyanosis.   HENT:      Head: Atraumatic.      Right Ear: Tympanic membrane, ear canal and external ear normal.      Left Ear: Tympanic membrane, ear canal and external ear normal.   Eyes:      Conjunctiva/sclera: Conjunctivae normal.   Neck:      Thyroid: No thyroid mass or thyromegaly.      Vascular: No carotid bruit or JVD.      Trachea: Trachea normal. No tracheal deviation.   Cardiovascular:      Rate and Rhythm: Normal rate and regular rhythm.      Heart sounds: Normal heart sounds, S1 normal and S2 normal. No murmur. No gallop. No S3 or S4 sounds.    Pulmonary:      Effort: Pulmonary effort is normal.      Breath sounds: Normal breath sounds.   Abdominal:      General: Bowel sounds are normal. There is no distension.      Palpations: Abdomen is soft. There is no hepatomegaly, splenomegaly or mass.      Tenderness: There is no abdominal tenderness.      Hernia: No hernia is present.   Musculoskeletal:      Left hand: He exhibits tenderness (about the first CMC joint). He exhibits no deformity and no swelling.      Right lower leg: No edema.      Left lower leg: No edema.   Lymphadenopathy:      Head:      Right side of head: No submental, submandibular, tonsillar, preauricular, posterior auricular or occipital adenopathy.      Left side of head: No submental, submandibular, tonsillar, preauricular, posterior auricular or occipital adenopathy.      Cervical: No cervical adenopathy.      Upper Body:      Right upper body: No " supraclavicular adenopathy.      Left upper body: No supraclavicular adenopathy.   Skin:     General: Skin is warm and dry.      Coloration: Skin is not cyanotic, jaundiced or pale.      Findings: No rash.      Nails: There is no clubbing.     Neurological:      Mental Status: He is alert and oriented to person, place, and time.      Cranial Nerves: No cranial nerve deficit.      Motor: No tremor.      Coordination: Coordination normal.      Gait: Gait normal.   Psychiatric:         Attention and Perception: Attention normal.         Mood and Affect: Mood normal.         Speech: Speech normal.         Behavior: Behavior normal.        Assessment/Plan   Advance Directive Discussion  Cardiovascular risk  Colon Cancer Screening  Immunizations Discussed/Encouraged (specific immunizations; adacel Tdap, Shingrix and COVID-19 )  Inactivity/Sedentary  Obesity/Overweight   Prostate Cancer Screening     The above risks/problems have been discussed with the patient.  Pertinent information has been shared with the patient in the After Visit Summary.  Follow up plans and orders are seen below in the Assessment/Plan Section.    Diagnoses and all orders for this visit:    1. Chronic seasonal allergic rhinitis   Reminded regarding allergen avoidance.    2. Dyslipidemia  Encouraged to continue to work on his diet and exercise plan.    3. Prediabetes  As above.  Reviewed options and agreed on a trial of metformin  -     metFORMIN ER (GLUCOPHAGE-XR) 500 MG 24 hr tablet; 1 po daily after supper  Dispense: 30 tablet; Refill: 5    4. Hypothyroidism due to Hashimoto's thyroiditis  Clinically and bio-chemically euthyroid.  Continue current medication.  -     Synthroid 88 MCG tablet; Take 1 tablet by mouth Daily.  Dispense: 90 tablet; Refill: 3    5. Non-alcoholic fatty liver disease    6. Gastroesophageal reflux disease without esophagitis   Symptoms are currently well controlled.  Reminded regarding lifestyle modification.    7.  Healthcare maintenance  Encouraged to obtain a COVID-19 immunization when available.  Reviewed the potential benefits and risks of colon and prostate cancer screening.  Patient will consider  We will discuss an updated Tdap along with Shingrix at his return    8. Migraine without aura and without status migrainosus, not intractable    9. Class 1 obesity with serious comorbidity and body mass index (BMI) of 32.0 to 32.9 in adult, unspecified obesity type  Complicated by dyslipidemia, prediabetes, NAFLD, GERD, and low back pain  As above.  Reviewed options and agreed on a trial of phentermine  -     phentermine (ADIPEX-P) 37.5 MG tablet; 1/2 po qd for 1 week then 1 po qd afterward  Dispense: 30 tablet; Refill: 2    10. Left hand pain  Referral to hand surgery  -     Ambulatory Referral to Orthopedic Surgery    11. Shortness of breath on exertion  Encouraged to report if any worse or if any new symptoms or concerns.      Follow Up:  Return in about 3 months (around 5/26/2021).     An After Visit Summary and PPPS were given to the patient.

## 2021-03-18 ENCOUNTER — BULK ORDERING (OUTPATIENT)
Dept: CASE MANAGEMENT | Facility: OTHER | Age: 51
End: 2021-03-18

## 2021-03-18 DIAGNOSIS — Z23 IMMUNIZATION DUE: ICD-10-CM

## 2021-05-26 DIAGNOSIS — Z00.00 HEALTHCARE MAINTENANCE: ICD-10-CM

## 2021-05-26 DIAGNOSIS — E78.5 DYSLIPIDEMIA: Primary | ICD-10-CM

## 2021-05-26 DIAGNOSIS — E03.8 HYPOTHYROIDISM DUE TO HASHIMOTO'S THYROIDITIS: ICD-10-CM

## 2021-05-26 DIAGNOSIS — R73.03 PREDIABETES: ICD-10-CM

## 2021-05-26 DIAGNOSIS — E06.3 HYPOTHYROIDISM DUE TO HASHIMOTO'S THYROIDITIS: ICD-10-CM

## 2021-05-27 ENCOUNTER — LAB (OUTPATIENT)
Dept: FAMILY MEDICINE CLINIC | Facility: CLINIC | Age: 51
End: 2021-05-27

## 2021-05-27 DIAGNOSIS — Z00.00 HEALTHCARE MAINTENANCE: ICD-10-CM

## 2021-05-27 DIAGNOSIS — E78.5 DYSLIPIDEMIA: ICD-10-CM

## 2021-05-27 DIAGNOSIS — E03.8 HYPOTHYROIDISM DUE TO HASHIMOTO'S THYROIDITIS: ICD-10-CM

## 2021-05-27 DIAGNOSIS — R73.03 PREDIABETES: ICD-10-CM

## 2021-05-27 DIAGNOSIS — E06.3 HYPOTHYROIDISM DUE TO HASHIMOTO'S THYROIDITIS: ICD-10-CM

## 2021-05-27 LAB
ALBUMIN SERPL-MCNC: 4.1 G/DL (ref 3.5–5.2)
ALBUMIN/GLOB SERPL: 1.4 G/DL
ALP SERPL-CCNC: 77 U/L (ref 39–117)
ALT SERPL W P-5'-P-CCNC: 30 U/L (ref 1–41)
ANION GAP SERPL CALCULATED.3IONS-SCNC: 9.3 MMOL/L (ref 5–15)
AST SERPL-CCNC: 19 U/L (ref 1–40)
BASOPHILS # BLD AUTO: 0.05 10*3/MM3 (ref 0–0.2)
BASOPHILS NFR BLD AUTO: 0.8 % (ref 0–1.5)
BILIRUB SERPL-MCNC: 0.5 MG/DL (ref 0–1.2)
BUN SERPL-MCNC: 13 MG/DL (ref 6–20)
BUN/CREAT SERPL: 11.6 (ref 7–25)
CALCIUM SPEC-SCNC: 9.4 MG/DL (ref 8.6–10.5)
CHLORIDE SERPL-SCNC: 104 MMOL/L (ref 98–107)
CHOLEST SERPL-MCNC: 186 MG/DL (ref 0–200)
CO2 SERPL-SCNC: 24.7 MMOL/L (ref 22–29)
CREAT SERPL-MCNC: 1.12 MG/DL (ref 0.76–1.27)
DEPRECATED RDW RBC AUTO: 42 FL (ref 37–54)
EOSINOPHIL # BLD AUTO: 0.26 10*3/MM3 (ref 0–0.4)
EOSINOPHIL NFR BLD AUTO: 4 % (ref 0.3–6.2)
ERYTHROCYTE [DISTWIDTH] IN BLOOD BY AUTOMATED COUNT: 13 % (ref 12.3–15.4)
GFR SERPL CREATININE-BSD FRML MDRD: 69 ML/MIN/1.73
GLOBULIN UR ELPH-MCNC: 2.9 GM/DL
GLUCOSE SERPL-MCNC: 96 MG/DL (ref 65–99)
HBA1C MFR BLD: 5.8 % (ref 4.8–5.6)
HCT VFR BLD AUTO: 46.6 % (ref 37.5–51)
HCV AB SER DONR QL: NORMAL
HDLC SERPL-MCNC: 36 MG/DL (ref 40–60)
HGB BLD-MCNC: 15.6 G/DL (ref 13–17.7)
IMM GRANULOCYTES # BLD AUTO: 0.02 10*3/MM3 (ref 0–0.05)
IMM GRANULOCYTES NFR BLD AUTO: 0.3 % (ref 0–0.5)
LDLC SERPL CALC-MCNC: 126 MG/DL (ref 0–100)
LDLC/HDLC SERPL: 3.42 {RATIO}
LYMPHOCYTES # BLD AUTO: 2.08 10*3/MM3 (ref 0.7–3.1)
LYMPHOCYTES NFR BLD AUTO: 32.1 % (ref 19.6–45.3)
MCH RBC QN AUTO: 30 PG (ref 26.6–33)
MCHC RBC AUTO-ENTMCNC: 33.5 G/DL (ref 31.5–35.7)
MCV RBC AUTO: 89.6 FL (ref 79–97)
MONOCYTES # BLD AUTO: 0.59 10*3/MM3 (ref 0.1–0.9)
MONOCYTES NFR BLD AUTO: 9.1 % (ref 5–12)
NEUTROPHILS NFR BLD AUTO: 3.48 10*3/MM3 (ref 1.7–7)
NEUTROPHILS NFR BLD AUTO: 53.7 % (ref 42.7–76)
NRBC BLD AUTO-RTO: 0 /100 WBC (ref 0–0.2)
PLATELET # BLD AUTO: 271 10*3/MM3 (ref 140–450)
PMV BLD AUTO: 11.4 FL (ref 6–12)
POTASSIUM SERPL-SCNC: 4.4 MMOL/L (ref 3.5–5.2)
PROT SERPL-MCNC: 7 G/DL (ref 6–8.5)
RBC # BLD AUTO: 5.2 10*6/MM3 (ref 4.14–5.8)
SODIUM SERPL-SCNC: 138 MMOL/L (ref 136–145)
TRIGL SERPL-MCNC: 134 MG/DL (ref 0–150)
TSH SERPL DL<=0.05 MIU/L-ACNC: 1.41 UIU/ML (ref 0.27–4.2)
VLDLC SERPL-MCNC: 24 MG/DL (ref 5–40)
WBC # BLD AUTO: 6.48 10*3/MM3 (ref 3.4–10.8)

## 2021-05-27 PROCEDURE — 83036 HEMOGLOBIN GLYCOSYLATED A1C: CPT | Performed by: GENERAL PRACTICE

## 2021-05-27 PROCEDURE — 84443 ASSAY THYROID STIM HORMONE: CPT | Performed by: GENERAL PRACTICE

## 2021-05-27 PROCEDURE — 80061 LIPID PANEL: CPT | Performed by: GENERAL PRACTICE

## 2021-05-27 PROCEDURE — 86803 HEPATITIS C AB TEST: CPT | Performed by: GENERAL PRACTICE

## 2021-05-27 PROCEDURE — 85025 COMPLETE CBC W/AUTO DIFF WBC: CPT | Performed by: GENERAL PRACTICE

## 2021-05-27 PROCEDURE — 80053 COMPREHEN METABOLIC PANEL: CPT | Performed by: GENERAL PRACTICE

## 2021-05-28 ENCOUNTER — OFFICE VISIT (OUTPATIENT)
Dept: FAMILY MEDICINE CLINIC | Facility: CLINIC | Age: 51
End: 2021-05-28

## 2021-05-28 DIAGNOSIS — E03.8 HYPOTHYROIDISM DUE TO HASHIMOTO'S THYROIDITIS: ICD-10-CM

## 2021-05-28 DIAGNOSIS — M25.571 CHRONIC PAIN OF RIGHT ANKLE: ICD-10-CM

## 2021-05-28 DIAGNOSIS — M54.59 MECHANICAL LOW BACK PAIN: ICD-10-CM

## 2021-05-28 DIAGNOSIS — E06.3 HYPOTHYROIDISM DUE TO HASHIMOTO'S THYROIDITIS: ICD-10-CM

## 2021-05-28 DIAGNOSIS — K76.0 NON-ALCOHOLIC FATTY LIVER DISEASE: ICD-10-CM

## 2021-05-28 DIAGNOSIS — G43.009 MIGRAINE WITHOUT AURA AND WITHOUT STATUS MIGRAINOSUS, NOT INTRACTABLE: ICD-10-CM

## 2021-05-28 DIAGNOSIS — E78.5 DYSLIPIDEMIA: ICD-10-CM

## 2021-05-28 DIAGNOSIS — E66.9 CLASS 1 OBESITY WITH SERIOUS COMORBIDITY AND BODY MASS INDEX (BMI) OF 32.0 TO 32.9 IN ADULT, UNSPECIFIED OBESITY TYPE: ICD-10-CM

## 2021-05-28 DIAGNOSIS — G89.29 CHRONIC PAIN OF RIGHT ANKLE: ICD-10-CM

## 2021-05-28 DIAGNOSIS — K21.9 GASTROESOPHAGEAL REFLUX DISEASE WITHOUT ESOPHAGITIS: ICD-10-CM

## 2021-05-28 DIAGNOSIS — Z00.00 HEALTHCARE MAINTENANCE: ICD-10-CM

## 2021-05-28 DIAGNOSIS — F32.A DEPRESSION, UNSPECIFIED DEPRESSION TYPE: ICD-10-CM

## 2021-05-28 DIAGNOSIS — R73.03 PREDIABETES: ICD-10-CM

## 2021-05-28 DIAGNOSIS — J30.2 CHRONIC SEASONAL ALLERGIC RHINITIS: Primary | ICD-10-CM

## 2021-05-28 PROCEDURE — 99214 OFFICE O/P EST MOD 30 MIN: CPT | Performed by: GENERAL PRACTICE

## 2021-05-28 NOTE — PROGRESS NOTES
Subjective   Fawad Morton is a 51 y.o. male.     History of Present Illness     Dyspnea with Exertion  While he remains short of breath with above average exertion this has remained stable and remains unassociated with any chest pain, orthopnea, PND, cough, calf pain or swelling of the ankles. Most episodes occur with sudden spurts of significant exertion and he continues to deny any problem with more moderate sustained exertion. CT coronary angiogram done on 3/23/15 was unremarkable as was a stress test performed on 2/11/15  Lab Results   Component Value Date    WBC 6.48 05/27/2021    HGB 15.6 05/27/2021    HCT 46.6 05/27/2021    MCV 89.6 05/27/2021     05/27/2021     GERD  He has a long history of of heartburn.  He continues to deny any abdominal bloating, dysphagia, dyspepsia, hematemesis, hoarseness, melena, nausea or unexpected weight loss. Symptoms appear to be worsened by large meals and lying down. Risk factors present for GERD include caffeine use. Risk factors absent for GERD are tobacco abuse, alcohol use and NSAID use. Studies performed so far include upper endoscopy, result: positive for reflux esophagitis, upper GI, result: negative, esophageal manometry and ph/imedence: positive for reflux . Treatments tried so far include proton pump inhibitor: omeprazole. Results of treatment: no heartburn however he continues to have a sense of fullness at the back of his throat.     Chronic Allergic Rhinitis  He has a long history of allergic rhinitis. Patient's symptoms include sneezing, clear rhinorrhea, nasal congestion and postnasal drip. These symptoms are perennial with seasonal exacerbation. He has tried a number of antihistamines and nasal corticosteroids with a limited relief of symptoms.     Hypothyroidism  Patient presents for evaluation of thyroid function. Symptoms consist of fatigue, weight gain, difficulty concentrating. The symptoms are moderate. The hypothyroidism is due to Hashimoto's  disease. He remains on levothyroxine 88 qd.  Lab Results   Component Value Date    TSH 1.410 05/27/2021     Dyslipidemia  Compliance with treatment has been fair. The patient exercises occasionally. He is currently being prescribed the following medication for his dyslipidemia - lifestyle modifcation.  He discontinued phentermine due to agitation  Lab Results   Component Value Date    CHOL 186 05/27/2021    CHLPL 195 01/27/2016    TRIG 134 05/27/2021    HDL 36 (L) 05/27/2021     (H) 05/27/2021      Prediabetes  He remains on metformin.  He has noted some moodiness, decrease in interest in activities, difficulty with his concentration, and intermittent erectile dysfunction.  While these symptoms predated starting on metformin he feels they have been worse since.  Lab Results   Component Value Date    HGBA1C 5.80 (H) 05/27/2021     Labs  Lab Results   Component Value Date    GLUCOSE 96 05/27/2021    BUN 13 05/27/2021    CREATININE 1.12 05/27/2021    EGFRIFNONA 69 05/27/2021    BCR 11.6 05/27/2021    K 4.4 05/27/2021    CO2 24.7 05/27/2021    CALCIUM 9.4 05/27/2021    ALBUMIN 4.10 05/27/2021    LABIL2 1.5 01/27/2016    AST 19 05/27/2021    ALT 30 05/27/2021     The following portions of the patient's history were reviewed and updated as appropriate: allergies, current medications, past medical history, past social history and problem list.    Review of Systems   Constitutional: Positive for fatigue. Negative for appetite change, chills, fever and unexpected weight change.   HENT: Positive for postnasal drip and rhinorrhea. Negative for congestion, ear pain, sneezing and sore throat.         Sense of fullness back of throat.  Occasional cold sores   Eyes: Negative for visual disturbance.   Respiratory: Positive for shortness of breath. Negative for cough and wheezing.    Cardiovascular: Negative for chest pain, palpitations and leg swelling.   Gastrointestinal: Negative for abdominal pain, blood in stool,  constipation, diarrhea, nausea and vomiting.   Endocrine: Negative for polydipsia and polyuria.   Genitourinary: Negative for difficulty urinating, dysuria, frequency, hematuria and urgency.        Nocturia x 1-2, intermittent erectile dysfunction   Musculoskeletal: Positive for arthralgias (right ankle). Negative for back pain, joint swelling, myalgias and neck pain.   Skin: Negative for rash.   Neurological: Negative for tremors, weakness, numbness and headaches.   Psychiatric/Behavioral: Positive for decreased concentration and dysphoric mood. Negative for sleep disturbance and suicidal ideas. The patient is not nervous/anxious.         Loss of interest in activities     Objective   Physical Exam  Constitutional:       General: He is not in acute distress.     Appearance: Normal appearance. He is well-developed. He is not ill-appearing or diaphoretic.      Comments: Bright and in fair spirits. No apparent distress. No pallor, jaundice, diaphoresis, or cyanosis.   HENT:      Head: Atraumatic.      Right Ear: Tympanic membrane, ear canal and external ear normal.      Left Ear: Tympanic membrane, ear canal and external ear normal.   Eyes:      Conjunctiva/sclera: Conjunctivae normal.   Neck:      Thyroid: No thyroid mass or thyromegaly.      Vascular: No carotid bruit or JVD.      Trachea: Trachea normal. No tracheal deviation.   Cardiovascular:      Rate and Rhythm: Normal rate and regular rhythm.      Heart sounds: Normal heart sounds, S1 normal and S2 normal. No murmur heard.   No gallop. No S3 or S4 sounds.    Pulmonary:      Effort: Pulmonary effort is normal.      Breath sounds: Normal breath sounds.   Abdominal:      General: Bowel sounds are normal. There is no distension.   Musculoskeletal:      Right lower leg: No edema.      Left lower leg: No edema.   Lymphadenopathy:      Head:      Right side of head: No submental, submandibular, tonsillar, preauricular, posterior auricular or occipital adenopathy.       Left side of head: No submental, submandibular, tonsillar, preauricular, posterior auricular or occipital adenopathy.      Cervical: No cervical adenopathy.      Upper Body:      Right upper body: No supraclavicular adenopathy.      Left upper body: No supraclavicular adenopathy.   Skin:     General: Skin is warm and dry.      Coloration: Skin is not cyanotic, jaundiced or pale.      Findings: No rash.      Nails: There is no clubbing.   Neurological:      Mental Status: He is alert and oriented to person, place, and time.      Cranial Nerves: No cranial nerve deficit.      Motor: No tremor.      Coordination: Coordination normal.      Gait: Gait normal.   Psychiatric:         Attention and Perception: Attention normal.         Mood and Affect: Mood normal.         Speech: Speech normal.         Behavior: Behavior normal.         Thought Content: Thought content normal.       Assessment/Plan   Problems Addressed this Visit        Allergies and Adverse Reactions    Chronic seasonal allergic rhinitis   Encouraged to report if any worse or if any new symptoms or concerns.       Cardiac and Vasculature    Dyslipidemia  Encouraged to continue to work on his diet and exercise plan.       Endocrine and Metabolic    Class 1 obesity with serious comorbidity and body mass index (BMI) of 32.0 to 32.9 in adult  As above.    Hypothyroidism due to Hashimoto's thyroiditis  Bbio-chemically euthyroid.  Continue current medication.    Prediabetes  As above.  Metformin will be discontinued for now       Gastrointestinal Abdominal     Gastroesophageal reflux disease without esophagitis  Encouraged to report if any worse or if any new symptoms or concerns.    Non-alcoholic fatty liver disease       Health Encounters    Healthcare maintenance  Patient has received both doses of the COVID-19 vaccine.  Reminded that he is due for an updated Tdap, Shingrix, and a screening colonoscopy.  He would like to consider and these will be discussed  further at his return       Mental Health    Depression  Reviewed options  Patient has a number of psychological services available to him through his work.  He will pursue these, discontinue metformin and see how he does over the next month  Encouraged to report if any worse or if any new symptoms or concerns.       Musculoskeletal and Injuries    Chronic pain of right ankle  We will arrange an orthopedic opinion    Relevant Orders    Ambulatory Referral to Orthopedic Surgery    Mechanical low back pain       Neuro    Migraine without aura and without status migrainosus, not intractable      Diagnoses       Codes Comments    Chronic seasonal allergic rhinitis    -  Primary ICD-10-CM: J30.2  ICD-9-CM: 477.8     Dyslipidemia     ICD-10-CM: E78.5  ICD-9-CM: 272.4     Prediabetes     ICD-10-CM: R73.03  ICD-9-CM: 790.29     Hypothyroidism due to Hashimoto's thyroiditis     ICD-10-CM: E03.8, E06.3  ICD-9-CM: 244.8, 245.2     Class 1 obesity with serious comorbidity and body mass index (BMI) of 32.0 to 32.9 in adult, unspecified obesity type     ICD-10-CM: E66.9, Z68.32  ICD-9-CM: 278.00, V85.32     Non-alcoholic fatty liver disease     ICD-10-CM: K76.0  ICD-9-CM: 571.8     Gastroesophageal reflux disease without esophagitis     ICD-10-CM: K21.9  ICD-9-CM: 530.81     Healthcare maintenance     ICD-10-CM: Z00.00  ICD-9-CM: V70.0     Mechanical low back pain     ICD-10-CM: M54.5  ICD-9-CM: 724.2     Migraine without aura and without status migrainosus, not intractable     ICD-10-CM: G43.009  ICD-9-CM: 346.10     Chronic pain of right ankle     ICD-10-CM: M25.571, G89.29  ICD-9-CM: 719.47, 338.29     Depression, unspecified depression type     ICD-10-CM: F32.9  ICD-9-CM: 311

## 2021-05-29 VITALS
RESPIRATION RATE: 14 BRPM | WEIGHT: 234 LBS | TEMPERATURE: 97.1 F | OXYGEN SATURATION: 96 % | HEART RATE: 76 BPM | DIASTOLIC BLOOD PRESSURE: 65 MMHG | SYSTOLIC BLOOD PRESSURE: 124 MMHG | HEIGHT: 72 IN | BODY MASS INDEX: 31.69 KG/M2

## 2021-05-29 PROBLEM — F32.A DEPRESSION: Status: ACTIVE | Noted: 2021-05-29

## 2021-07-07 ENCOUNTER — OFFICE VISIT (OUTPATIENT)
Dept: ORTHOPEDIC SURGERY | Facility: CLINIC | Age: 51
End: 2021-07-07

## 2021-07-07 VITALS
HEIGHT: 72 IN | SYSTOLIC BLOOD PRESSURE: 162 MMHG | HEART RATE: 109 BPM | BODY MASS INDEX: 31.83 KG/M2 | WEIGHT: 235 LBS | DIASTOLIC BLOOD PRESSURE: 98 MMHG

## 2021-07-07 DIAGNOSIS — M25.571 CHRONIC PAIN OF RIGHT ANKLE: ICD-10-CM

## 2021-07-07 DIAGNOSIS — M25.571 RIGHT ANKLE PAIN, UNSPECIFIED CHRONICITY: Primary | ICD-10-CM

## 2021-07-07 DIAGNOSIS — G89.29 CHRONIC PAIN OF RIGHT ANKLE: ICD-10-CM

## 2021-07-07 PROCEDURE — 99203 OFFICE O/P NEW LOW 30 MIN: CPT | Performed by: ORTHOPAEDIC SURGERY

## 2021-07-07 NOTE — PROGRESS NOTES
NEW PATIENT    Patient: Fawad Morton  : 1970    Primary Care Provider: Ben Bowie MD    Requesting Provider: As above    Pain of the Right Ankle      History    Chief Complaint: Right ankle pain    History of Present Illness: This is an extremely pleasant 51-year-old gentleman with a 10+ year history of right ankle pain.  He reports that sometime around  he had a severe inversion injury at work.  He works for the [employer].  He was advised it was a sprain.  He has had persistent pain and feeling of instability.  He was never sent to PT.  He reports that the ankle feels weak, he easily inverts stepping on even a small pebble.  He notes some persistent lateral swelling.  He rates the pain at its worst 5 out of 10, aching.    Current Outpatient Medications on File Prior to Visit   Medication Sig Dispense Refill   • Synthroid 88 MCG tablet Take 1 tablet by mouth Daily. 90 tablet 3   • valACYclovir (VALTREX) 1000 MG tablet 2 tablets every 12 hours for one day at the first sign of a cold sore 12 tablet 3     No current facility-administered medications on file prior to visit.      Allergies   Allergen Reactions   • Penicillins       Past Medical History:   Diagnosis Date   • Allergic sinusitis    • GERD (gastroesophageal reflux disease)    • Hyperlipidemia    • Hypogonadism, male    • Hypothyroidism    • Mechanical low back pain    • Migraine    • Non-alcoholic fatty liver disease    • Prediabetes    • Situational anxiety    • Skin tag      No past surgical history on file.  Family History   Problem Relation Age of Onset   • No Known Problems Mother       Social History     Socioeconomic History   • Marital status:      Spouse name: [place]   • Number of children: 4   • Years of education: 18   • Highest education level: Not on file   Tobacco Use   • Smoking status: Former Smoker   • Smokeless tobacco: Never Used   Substance and Sexual Activity   • Alcohol use: Yes      "Alcohol/week: 1.0 standard drinks     Types: 1 Cans of beer per week   • Drug use: No   • Sexual activity: Defer        Review of Systems   Constitutional: Negative.    HENT: Negative.    Eyes: Negative.    Respiratory: Negative.    Cardiovascular: Negative.    Gastrointestinal: Negative.    Endocrine: Negative.    Genitourinary: Negative.    Musculoskeletal: Positive for arthralgias.   Skin: Negative.    Allergic/Immunologic: Negative.    Neurological: Negative.    Hematological: Negative.    Psychiatric/Behavioral: Negative.        The following portions of the patient's history were reviewed and updated as appropriate: allergies, current medications, past family history, past medical history, past social history, past surgical history and problem list.    Physical Exam:   /98   Pulse 109   Ht 182.9 cm (72.01\")   Wt 107 kg (235 lb)   BMI 31.86 kg/m²   GENERAL: Body habitus: obese    Lower extremity edema: Right: none; Left: none    Varicose veins:  Right: none; Left: none    Gait: normal     Mental Status:  awake and alert; oriented to person, place, and time    Voice:  clear  SKIN:  Lower extremity: Normal    Hair Growth(lower extremity):  Right:normal; Left:  normal  NAILS: Toenails: normal  HEENT: Head: Normocephalic, atraumatic,  without obvious abnormality.  eye: normal external eye, no icterus  ears:normal external ears  PULM:  Repiratory effort normal  CV:  Dorsalis Pedis:  Right: 2+; Left:2+    Posterior Tibial: Right:2+; Left:2+    Capillary Refill:  Brisk  MSK:  Hand:sensation intact      Tibia:  Right:  non tender; Left:  non tender      Ankle:  Right: Mildly tender over the anterolateral joint, nontender anywhere else around the ankle, nontender over the deltoid, slight thickening laterally but no effusion, no tenderness over the peroneals, very slightly tender over the Achilles about 4 cm above the insertion, no thickening of the tendon, no crepitus, the ankle is stable to anterior drawer, " range of motion is normal and symmetric with the left; Left:  non tender, ROM  normal and symmetric and motor function  normal      Foot:  Right:  non tender, ROM  normal and motor function  normal; Left:  non tender, ROM  normal and motor function  normal      NEURO: Heel Walking:  Right:  normal, reports mild pain in the heel; Left:  normal    Toe Walking:  Right:  normal and Reports mild pain in the Achilles; Left:  normal     [place] 5.07 monofilament test: normal    Lower extremity sensation: intact     Reflexes:  Biceps:  Right:  not tested; Left:  not tested           Quads:  Right:  not tested; Left:  not tested           Ankle:  Right:  not tested; Left:  not tested      Calf Atrophy:none    Motor Function: all 5/5         Medical Decision Making    Data Review:   ordered and reviewed x-rays today    Assessment and Plan/ Diagnosis/Treatment options:   1. Right ankle pain, unspecified chronicity  Chronic ankle pain and feeling of instability.  I explained that especially with severe sprains is very important to do physical therapy, because we lose proprioception with the injury.  Even the highest level athletes need to do physical therapy for the side to side coordination of the muscles which is what we call proprioception.  I suspect that is going to be the most important part of his treatment.  But before we do that we need to do an MRI to make sure there is no OCD lesion, or anything else going on.  I will see him after the MRI.  I told him that I expect to see ligamental damage with scarring,.  - XR Ankle 2 View Right            Radiology Ordered []  Radiology Reports Reviewed []      Radiology Images Reviewed []   Labs Reviewed []    Labs Ordered []   PCP Records Reviewed []    Provider Records Reviewed []    ER Records Reviewed []    Hospital Records Reviewed []    History Obtained From Family []    Phone conversation with Provider []    Records Requested []        Loyda Smith MD

## 2021-08-03 ENCOUNTER — OFFICE VISIT (OUTPATIENT)
Dept: FAMILY MEDICINE CLINIC | Facility: CLINIC | Age: 51
End: 2021-08-03

## 2021-08-03 VITALS
OXYGEN SATURATION: 98 % | HEART RATE: 78 BPM | DIASTOLIC BLOOD PRESSURE: 80 MMHG | BODY MASS INDEX: 31.83 KG/M2 | WEIGHT: 235 LBS | HEIGHT: 72 IN | TEMPERATURE: 97.1 F | SYSTOLIC BLOOD PRESSURE: 118 MMHG

## 2021-08-03 DIAGNOSIS — Z23 ENCOUNTER FOR IMMUNIZATION: ICD-10-CM

## 2021-08-03 DIAGNOSIS — S61.215A LACERATION OF LEFT RING FINGER WITHOUT FOREIGN BODY WITHOUT DAMAGE TO NAIL, INITIAL ENCOUNTER: Primary | ICD-10-CM

## 2021-08-03 PROCEDURE — 99213 OFFICE O/P EST LOW 20 MIN: CPT | Performed by: NURSE PRACTITIONER

## 2021-08-03 PROCEDURE — 90472 IMMUNIZATION ADMIN EACH ADD: CPT | Performed by: NURSE PRACTITIONER

## 2021-08-03 PROCEDURE — 90471 IMMUNIZATION ADMIN: CPT | Performed by: NURSE PRACTITIONER

## 2021-08-03 PROCEDURE — 90746 HEPB VACCINE 3 DOSE ADULT IM: CPT | Performed by: NURSE PRACTITIONER

## 2021-08-03 PROCEDURE — 90715 TDAP VACCINE 7 YRS/> IM: CPT | Performed by: NURSE PRACTITIONER

## 2021-08-03 NOTE — PATIENT INSTRUCTIONS
Laceration Care, Adult  A laceration is a cut that may go through all layers of the skin. The cut may also go into the tissue that is right under the skin. Some cuts heal on their own. Others need to be closed with stitches (sutures), staples, skin adhesive strips, or skin glue. Taking care of your injury lowers your risk of infection, helps your injury to heal better, and may prevent scarring.  Supplies needed:  · Soap.  · Water.  · Hand .  · Bandage (dressing).  · Antibiotic ointment.  · Clean towel.  How to take care of your cut  Wash your hands with soap and water before touching your wound or changing your bandage. If soap and water are not available, use hand .  If your doctor used stitches or staples:  · Keep the wound clean and dry.  · If you were given a bandage, change it at least once a day as told by your doctor. You should also change it if it gets wet or dirty.  · Keep the wound completely dry for the first 24 hours, or as told by your doctor. After that, you may take a shower or a bath. Do not get the wound soaked in water until after the stitches or staples have been removed.  · Clean the wound once a day, or as told by your doctor:  ? Wash the wound with soap and water.  ? Rinse the wound with water to remove all soap.  ? Pat the wound dry with a clean towel. Do not rub the wound.  · After you clean the wound, put a thin layer of antibiotic ointment on it as told by your doctor. This ointment:  ? Helps to prevent infection.  ? Keeps the bandage from sticking to the wound.  · Have your stitches or staples removed as told by your doctor.  If your doctor used skin adhesive strips:  · Keep the wound clean and dry.  · If you were given a bandage, you should change it at least once a day as told by your doctor. You should also change it if it gets wet or dirty.  · Do not get the skin adhesive strips wet. You can take a shower or a bath, but keep the wound dry.  · If the wound gets wet,  pat it dry with a clean towel. Do not rub the wound.  · Skin adhesive strips fall off on their own. You can trim the strips as the wound heals. Do not remove any strips that are still stuck to the wound. They will fall off after a while.  If your doctor used skin glue:  · Try to keep your wound dry, but you may briefly wet it in the shower or bath. Do not soak the wound in water, such as by swimming.  · After you take a shower or a bath, gently pat the wound dry with a clean towel. Do not rub the wound.  · Do not do any activities that will make you really sweaty until the skin glue has fallen off on its own.  · Do not apply liquid, cream, or ointment medicine to your wound while the skin glue is still on.  · If you were given a bandage, you should change it at least once a day or as told by your doctor. You should also change it if it gets dirty or wet.  · If a bandage is placed over the wound, do not let the tape touch the skin glue.  · Do not pick at the glue. The skin glue usually stays on for 5-10 days. Then, it falls off the skin.  General instructions    · Take over-the-counter and prescription medicines only as told by your doctor.  · If you were given antibiotic medicine or ointment, take or apply it as told by your doctor. Do not stop using it even if your condition improves.  · Do not scratch or pick at the wound.  · Check your wound every day for signs of infection. Watch for:  ? Redness, swelling, or pain.  ? Fluid, blood, or pus.  · Raise (elevate) the injured area above the level of your heart while you are sitting or lying down.  · If directed, put ice on the affected area:  ? Put ice in a plastic bag.  ? Place a towel between your skin and the bag.  ? Leave the ice on for 20 minutes, 2-3 times a day.  · Prevent scarring by covering your wound with sunscreen of at least 30 SPF whenever you are outside after your wound has healed.  · Keep all follow-up visits as told by your doctor. This is  important.  Get help if:  · You got a tetanus shot and you have any of these problems at the injection site:  ? Swelling.  ? Very bad pain.  ? Redness.  ? Bleeding.  · You have a fever.  · A wound that was closed breaks open.  · You notice a bad smell coming from your wound or your bandage.  · You notice something coming out of the wound, such as wood or glass.  · Medicine does not relieve your pain.  · You have more redness, swelling, or pain at the site of your wound.  · You have fluid, blood, or pus coming from your wound.  · You notice a change in the color of your skin near your wound.  · You need to change the bandage often because fluid, blood, or pus is coming from the wound.  · You start to have a new rash.  · You start to have numbness around the wound.  Get help right away if:  · You have very bad swelling around the wound.  · Your pain suddenly gets worse and is very bad.  · You notice painful lumps near the wound or anywhere on your body.  · You have a red streak going away from your wound.  · The wound is on your hand or foot, and:  ? You cannot move a finger or toe.  ? Your fingers or toes look pale or bluish.  Summary  · A laceration is a cut that may go through all layers of the skin. The cut may also go into the tissue right under the skin.  · Some cuts heal on their own. Others need to be closed with stitches, staples, skin adhesive strips, or skin glue.  · Follow your doctor's instructions for caring for your cut. Proper care of a cut lowers the risk of infection, helps the cut heal better, and prevents scarring.  This information is not intended to replace advice given to you by your health care provider. Make sure you discuss any questions you have with your health care provider.  Document Revised: 02/15/2019 Document Reviewed: 01/07/2019  [employer] Patient Education © 2021 [employer]  Hepatitis B Vaccine, Recombinant injection  What is this medicine?  HEPATITIS B VACCINE (hep uh TAHY tis B  GARCÍA waldrop) is a vaccine. It is used to prevent an infection with the hepatitis B virus.  This medicine may be used for other purposes; ask your health care provider or pharmacist if you have questions.  COMMON BRAND NAME(S): Engerix-B, Recombivax HB  What should I tell my health care provider before I take this medicine?  They need to know if you have any of these conditions:  · fever, infection  · heart disease  · hepatitis B infection  · immune system problems  · kidney disease  · an unusual or allergic reaction to vaccines, yeast, other medicines, foods, dyes, or preservatives  · pregnant or trying to get pregnant  · breast-feeding  How should I use this medicine?  This vaccine is for injection into a muscle. It is given by a health care professional.  A copy of Vaccine Information Statements will be given before each vaccination. Read this sheet carefully each time. The sheet may change frequently.  Talk to your pediatrician regarding the use of this medicine in children. While this drug may be prescribed for children as young as  for selected conditions, precautions do apply.  Overdosage: If you think you have taken too much of this medicine contact a poison control center or emergency room at once.  NOTE: This medicine is only for you. Do not share this medicine with others.  What if I miss a dose?  It is important not to miss your dose. Call your doctor or health care professional if you are unable to keep an appointment.  What may interact with this medicine?  · medicines that suppress your immune function like adalimumab, anakinra, infliximab  · medicines to treat cancer  · steroid medicines like prednisone or cortisone  This list may not describe all possible interactions. Give your health care provider a list of all the medicines, herbs, non-prescription drugs, or dietary supplements you use. Also tell them if you smoke, drink alcohol, or use illegal drugs. Some items may interact with your  medicine.  What should I watch for while using this medicine?  See your health care provider for all shots of this vaccine as directed. You must have 3 shots of this vaccine for protection from hepatitis B infection. Tell your doctor right away if you have any serious or unusual side effects after getting this vaccine.  What side effects may I notice from receiving this medicine?  Side effects that you should report to your doctor or health care professional as soon as possible:  · allergic reactions like skin rash, itching or hives, swelling of the face, lips, or tongue  · breathing problems  · confused, irritated  · fast, irregular heartbeat  · flu-like syndrome  · numb, tingling pain  · seizures  · unusually weak or tired  Side effects that usually do not require medical attention (report to your doctor or health care professional if they continue or are bothersome):  · diarrhea  · fever  · headache  · loss of appetite  · muscle pain  · nausea  · pain, redness, swelling, or irritation at site where injected  · tiredness  This list may not describe all possible side effects. Call your doctor for medical advice about side effects. You may report side effects to FDA at 9-584-FDA-0607.  Where should I keep my medicine?  This drug is given in a hospital or clinic and will not be stored at home.  NOTE: This sheet is a summary. It may not cover all possible information. If you have questions about this medicine, talk to your doctor, pharmacist, or health care provider.  © 2021 [employer]/Gold Standard (2015-04-20 13:26:01)  Vaccination Schedule:  2nd: Sept 3rd, 2021 3rd March 3rd, 2022

## 2021-08-03 NOTE — PROGRESS NOTES
Fawad Morton is a 51 y.o. male who presents to the clinic today c/o left ring finger laceration which occurred yesterday. His DT is not up to date.      Hand Injury   The incident occurred 12 to 24 hours ago. The pain is present in the left fingers. The pain does not radiate. The pain is mild. Pertinent negatives include no chest pain, muscle weakness, numbness or tingling.      The following portions of the patient's history were reviewed and updated as appropriate: allergies, current medications, past family history, past medical history, past social history, past surgical history and problem list.    Current Outpatient Medications:   •  Synthroid 88 MCG tablet, Take 1 tablet by mouth Daily., Disp: 90 tablet, Rfl: 3  •  valACYclovir (VALTREX) 1000 MG tablet, 2 tablets every 12 hours for one day at the first sign of a cold sore, Disp: 12 tablet, Rfl: 3  •  mupirocin (Bactroban) 2 % ointment, Apply  topically to the appropriate area as directed 3 (Three) Times a Day., Disp: 30 g, Rfl: 0    Allergies   Allergen Reactions   • Penicillins      Review of Systems   Constitutional: Negative for activity change, appetite change, chills, fatigue, fever and unexpected weight change.   HENT: Negative.    Eyes: Negative for visual disturbance.   Respiratory: Negative for cough, shortness of breath and wheezing.    Cardiovascular: Negative for chest pain, palpitations and leg swelling.   Gastrointestinal: Negative for nausea and vomiting.   Endocrine: Negative for cold intolerance, heat intolerance, polydipsia, polyphagia and polyuria.   Skin: Positive for wound. Negative for color change and rash.   Neurological: Negative for dizziness, tingling, tremors, weakness, light-headedness, numbness and headaches.   Hematological: Negative for adenopathy.   Psychiatric/Behavioral: Negative for decreased concentration. The patient is not nervous/anxious.    All other systems reviewed and are negative.    Visit Vitals  /80 (BP  "Location: Left arm, Patient Position: Sitting, Cuff Size: Adult)   Pulse 78   Temp 97.1 °F (36.2 °C) (Temporal)   Ht 182.9 cm (72.01\")   Wt 107 kg (235 lb)   SpO2 98%   BMI 31.86 kg/m²     Physical Exam  Vitals and nursing note reviewed.   Constitutional:       General: He is not in acute distress.     Appearance: He is well-developed.      Comments: Pleasant;    HENT:      Head: Normocephalic.      Nose: Nose normal.   Eyes:      General: No scleral icterus.        Right eye: No discharge.         Left eye: No discharge.      Conjunctiva/sclera: Conjunctivae normal.   Neck:      Thyroid: No thyromegaly.   Cardiovascular:      Rate and Rhythm: Normal rate and regular rhythm.      Heart sounds: Normal heart sounds. No murmur heard.   No friction rub.   Pulmonary:      Effort: No respiratory distress.      Breath sounds: Normal breath sounds. No decreased breath sounds, wheezing, rhonchi or rales.   Abdominal:      Palpations: Abdomen is soft.      Tenderness: There is no abdominal tenderness. There is no guarding.   Musculoskeletal:         General: Signs of injury present.      Cervical back: Neck supple.      Right lower leg: No edema.      Left lower leg: No edema.   Lymphadenopathy:      Cervical: No cervical adenopathy.   Skin:     General: Skin is warm and dry.      Capillary Refill: Capillary refill takes less than 2 seconds.      Findings: Laceration present. No erythema or rash.      Comments: 1 cm linear superficial laceration at the base of the left ring finger. It is clean; no drainage; no s/s of infection   Neurological:      Mental Status: He is alert and oriented to person, place, and time.      Cranial Nerves: Cranial nerves are intact.      Gait: Gait is intact.   Psychiatric:         Mood and Affect: Mood normal.         Speech: Speech normal.         Behavior: Behavior is cooperative.         Thought Content: Thought content normal.         Cognition and Memory: Cognition and memory normal. "       Assessment/Plan   Diagnoses and all orders for this visit:    1. Laceration of left ring finger without foreign body without damage to nail, initial encounter (Primary)  Comments:  Findings and recommendations discussed with Fawad. Reviewed treatment options. Counseled regarding supportive care measures.  Orders:  -     mupirocin (Bactroban) 2 % ointment; Apply  topically to the appropriate area as directed 3 (Three) Times a Day.  Dispense: 30 g; Refill: 0    2. Encounter for immunization  Comments:  Initial Hep B and to return in one month for 2nd dose and 3/3/2022 for 3rd dose.   Orders:  -     Tdap Vaccine Greater Than or Equal To 6yo IM; Future  -     Hepatitis B Vaccine Adult IM - Engerix  -     Tdap Vaccine Greater Than or Equal To 6yo IM    Findings and recommendations discussed with Fawad. Reviewed treatment options. Counseled regarding supportive care measures. Encouraged Fawad to seek further medical evaluation if symptoms worsen or do not improve. He has f/u with Dr Bowie in September.        This document has been electronically signed by DEMETRICE Moreno, KIYA-BC, FELY  August 3, 2021 17:57 EDT

## 2021-09-03 ENCOUNTER — CLINICAL SUPPORT (OUTPATIENT)
Dept: FAMILY MEDICINE CLINIC | Facility: CLINIC | Age: 51
End: 2021-09-03

## 2021-09-03 PROCEDURE — 90746 HEPB VACCINE 3 DOSE ADULT IM: CPT | Performed by: GENERAL PRACTICE

## 2021-09-03 PROCEDURE — 90471 IMMUNIZATION ADMIN: CPT | Performed by: GENERAL PRACTICE

## 2021-09-24 ENCOUNTER — OFFICE VISIT (OUTPATIENT)
Dept: FAMILY MEDICINE CLINIC | Facility: CLINIC | Age: 51
End: 2021-09-24

## 2021-09-24 DIAGNOSIS — G43.009 MIGRAINE WITHOUT AURA AND WITHOUT STATUS MIGRAINOSUS, NOT INTRACTABLE: ICD-10-CM

## 2021-09-24 DIAGNOSIS — E66.9 CLASS 1 OBESITY WITH SERIOUS COMORBIDITY AND BODY MASS INDEX (BMI) OF 32.0 TO 32.9 IN ADULT, UNSPECIFIED OBESITY TYPE: ICD-10-CM

## 2021-09-24 DIAGNOSIS — M25.571 CHRONIC PAIN OF RIGHT ANKLE: ICD-10-CM

## 2021-09-24 DIAGNOSIS — K21.9 GASTROESOPHAGEAL REFLUX DISEASE WITHOUT ESOPHAGITIS: ICD-10-CM

## 2021-09-24 DIAGNOSIS — B00.1 RECURRENT COLD SORES: ICD-10-CM

## 2021-09-24 DIAGNOSIS — Z00.00 HEALTHCARE MAINTENANCE: ICD-10-CM

## 2021-09-24 DIAGNOSIS — R06.02 SHORTNESS OF BREATH ON EXERTION: ICD-10-CM

## 2021-09-24 DIAGNOSIS — K76.0 NON-ALCOHOLIC FATTY LIVER DISEASE: ICD-10-CM

## 2021-09-24 DIAGNOSIS — R73.03 PREDIABETES: ICD-10-CM

## 2021-09-24 DIAGNOSIS — E03.8 HYPOTHYROIDISM DUE TO HASHIMOTO'S THYROIDITIS: ICD-10-CM

## 2021-09-24 DIAGNOSIS — E78.5 DYSLIPIDEMIA: ICD-10-CM

## 2021-09-24 DIAGNOSIS — E06.3 HYPOTHYROIDISM DUE TO HASHIMOTO'S THYROIDITIS: ICD-10-CM

## 2021-09-24 DIAGNOSIS — G89.29 CHRONIC PAIN OF RIGHT ANKLE: ICD-10-CM

## 2021-09-24 DIAGNOSIS — J30.2 CHRONIC SEASONAL ALLERGIC RHINITIS: Primary | ICD-10-CM

## 2021-09-24 PROCEDURE — 99214 OFFICE O/P EST MOD 30 MIN: CPT | Performed by: GENERAL PRACTICE

## 2021-09-24 RX ORDER — IPRATROPIUM BROMIDE 42 UG/1
2 SPRAY, METERED NASAL 4 TIMES DAILY
Qty: 15 ML | Refills: 5 | Status: SHIPPED | OUTPATIENT
Start: 2021-09-24

## 2021-09-24 RX ORDER — VALACYCLOVIR HYDROCHLORIDE 1 G/1
TABLET, FILM COATED ORAL
Qty: 12 TABLET | Refills: 3 | Status: SHIPPED | OUTPATIENT
Start: 2021-09-24

## 2021-09-24 RX ORDER — LEVOTHYROXINE SODIUM 88 MCG
88 TABLET ORAL DAILY
Qty: 90 TABLET | Refills: 3 | Status: SHIPPED | OUTPATIENT
Start: 2021-09-24 | End: 2022-10-07 | Stop reason: SDUPTHER

## 2021-09-24 NOTE — PROGRESS NOTES
Subjective   Fawad Morton is a 51 y.o. male.     Chief Complaint  He returns for a scheduled reassessment of multiple medical problems including obesity with a BMI of 32, deconditioning, dyslipidemia, prediabetes, hypothyroidism, and chronic allergic rhinitis    History of Present Illness     Dyspnea with Exertion  While he remains short of breath with above average exertion this has remained stable and remains unassociated with any chest pain, orthopnea, PND, cough, calf pain or swelling of the ankles. Most episodes occur with sudden spurts of significant exertion and he continues to deny any problem with more moderate sustained exertion. CT coronary angiogram done on 3/23/15 was unremarkable as was a stress test performed on 2/11/15    Chronic Allergic Rhinitis  He has a long history of allergic rhinitis.  His symptoms have included sneezing, clear rhinorrhea, nasal congestion and postnasal drip. These symptoms are perennial with seasonal exacerbation. He has tried a number of antihistamines and nasal corticosteroids with a limited relief of symptoms.  He feels nasal ipratropium helped some in the past and has been taking OTC pseudoephedrine since last here with some benefit and no apparent side effects    Hypothyroidism  Patient presents for evaluation of thyroid function. Symptoms consist of fatigue, weight gain, difficulty concentrating. The symptoms are moderate. The hypothyroidism is due to Hashimoto's disease. He remains on levothyroxine 88 qd.    Dyslipidemia  Compliance with treatment has been fair. The patient exercises occasionally. He is currently being prescribed the following medication for his dyslipidemia - lifestyle modifcation.      Prediabetes  He felt metformin affected his mood in a negative way.    GERD  He has a long history of of heartburn.  He continues to deny any abdominal bloating, dysphagia, dyspepsia, hematemesis, hoarseness, melena, nausea or unexpected weight loss. Symptoms appear  to be worsened by large meals and lying down. Risk factors present for GERD include caffeine use. Risk factors absent for GERD are tobacco abuse, alcohol use and NSAID use. Studies performed so far include upper endoscopy, result: positive for reflux esophagitis, upper GI, result: negative, esophageal manometry and ph/imedence: positive for reflux . Treatments tried so far include proton pump inhibitor: omeprazole. Results of treatment: no heartburn however he continues to have a sense of fullness at the back of his throat.     The following portions of the patient's history were reviewed and updated as appropriate: allergies, current medications, past medical history, past social history and problem list.    Review of Systems   Constitutional: Positive for fatigue. Negative for appetite change, chills, fever and unexpected weight change.   HENT: Positive for postnasal drip, rhinorrhea and sneezing. Negative for congestion, ear pain and sore throat.         Sense of fullness back of throat.  Occasional cold sores   Eyes: Negative for visual disturbance.   Respiratory: Positive for shortness of breath. Negative for cough and wheezing.    Cardiovascular: Negative for chest pain, palpitations and leg swelling.   Gastrointestinal: Negative for abdominal pain, blood in stool, constipation, diarrhea, nausea and vomiting.   Endocrine: Negative for polydipsia and polyuria.   Genitourinary: Negative for difficulty urinating, dysuria, frequency, hematuria and urgency.        Nocturia x 1-2, intermittent erectile dysfunction   Musculoskeletal: Positive for arthralgias (right ankle). Negative for back pain, joint swelling, myalgias and neck pain.   Skin: Negative for rash.   Neurological: Negative for tremors, weakness, numbness and headaches.   Psychiatric/Behavioral: Negative for decreased concentration, dysphoric mood, sleep disturbance and suicidal ideas. The patient is not nervous/anxious.         Loss of interest in  activities     Objective   Physical Exam  Constitutional:       General: He is not in acute distress.     Appearance: Normal appearance. He is well-developed. He is not ill-appearing or diaphoretic.      Comments: Bright and in fair spirits. No apparent distress. No pallor, jaundice, diaphoresis, or cyanosis.   HENT:      Head: Atraumatic.      Right Ear: Tympanic membrane, ear canal and external ear normal.      Left Ear: Tympanic membrane, ear canal and external ear normal.   Eyes:      Conjunctiva/sclera: Conjunctivae normal.   Neck:      Thyroid: No thyroid mass or thyromegaly.      Vascular: No carotid bruit or JVD.      Trachea: Trachea normal. No tracheal deviation.   Cardiovascular:      Rate and Rhythm: Normal rate and regular rhythm.      Heart sounds: Normal heart sounds, S1 normal and S2 normal. No murmur heard.   No gallop. No S3 or S4 sounds.    Pulmonary:      Effort: Pulmonary effort is normal.      Breath sounds: Normal breath sounds.   Abdominal:      General: Bowel sounds are normal. There is no distension.   Musculoskeletal:      Right lower leg: No edema.      Left lower leg: No edema.   Lymphadenopathy:      Head:      Right side of head: No submental, submandibular, tonsillar, preauricular, posterior auricular or occipital adenopathy.      Left side of head: No submental, submandibular, tonsillar, preauricular, posterior auricular or occipital adenopathy.      Cervical: No cervical adenopathy.      Upper Body:      Right upper body: No supraclavicular adenopathy.      Left upper body: No supraclavicular adenopathy.   Skin:     General: Skin is warm and dry.      Coloration: Skin is not cyanotic, jaundiced or pale.      Findings: No rash.      Nails: There is no clubbing.   Neurological:      Mental Status: He is alert and oriented to person, place, and time.      Cranial Nerves: No cranial nerve deficit.      Motor: No tremor.      Coordination: Coordination normal.      Gait: Gait normal.    Psychiatric:         Attention and Perception: Attention normal.         Mood and Affect: Mood normal.         Speech: Speech normal.         Behavior: Behavior normal.         Thought Content: Thought content normal.       Assessment/Plan   Problems Addressed this Visit        Allergies and Adverse Reactions    Chronic seasonal allergic rhinitis  Reminded regarding allergen avoidance.  Patient on interested in an allergy or ENT reassessment but will consider  Nasal ipratropium and will be resumed  Continue pseudoephedrine  Encouraged to report if any worse or if any new symptoms or concerns.    Relevant Medications    ipratropium (ATROVENT) 0.06 % nasal spray    Pseudoephedrine HCl  MG tablet sustained-release 24 hour       Cardiac and Vasculature    Dyslipidemia  Encouraged to continue to work on his diet and exercise plan.  Scheduled for updated labs just prior to his return    Relevant Orders    Comprehensive Metabolic Panel    Lipid Panel    Shortness of breath on exertion  Deconditioning  Reminded regarding a gradual increase in his level of activity       Endocrine and Metabolic    Class 1 obesity with serious comorbidity and body mass index (BMI) of 32.0 to 32.9 in adult  Complicated by deconditioning, hyperlipidemia, GERD, and joint pain    Hypothyroidism due to Hashimoto's thyroiditis  Clinically and bio-chemically euthyroid.  Continue current medication.    Relevant Medications    Synthroid 88 MCG tablet    Other Relevant Orders    TSH    Prediabetes  As above.  Will continue to monitor    Relevant Orders    Comprehensive Metabolic Panel    Hemoglobin A1c       Gastrointestinal Abdominal     Gastroesophageal reflux disease without esophagitis   Symptoms are currently stable.  Reminded regarding lifestyle modification.    Relevant Orders    CBC & Differential    Non-alcoholic fatty liver disease    Relevant Orders    Comprehensive Metabolic Panel       Health Encounters    Healthcare  maintenance  Patient has received both doses of the COVID-19 vaccine.  He will receive a flu shot at work  Reminded that he is due for Shingrix along with a screening colonoscopy.  He remains on interested in pursuing these at present but will consider       Musculoskeletal and Injuries    Chronic pain of right ankle  Follow up with orthopedic surgery       Neuro    Migraine without aura and without status migrainosus, not intractable         Diagnoses       Codes Comments    Chronic seasonal allergic rhinitis    -  Primary ICD-10-CM: J30.2  ICD-9-CM: 477.8     Dyslipidemia     ICD-10-CM: E78.5  ICD-9-CM: 272.4     Prediabetes     ICD-10-CM: R73.03  ICD-9-CM: 790.29     Hypothyroidism due to Hashimoto's thyroiditis     ICD-10-CM: E03.8, E06.3  ICD-9-CM: 244.8, 245.2     Non-alcoholic fatty liver disease     ICD-10-CM: K76.0  ICD-9-CM: 571.8     Gastroesophageal reflux disease without esophagitis     ICD-10-CM: K21.9  ICD-9-CM: 530.81     Healthcare maintenance     ICD-10-CM: Z00.00  ICD-9-CM: V70.0     Migraine without aura and without status migrainosus, not intractable     ICD-10-CM: G43.009  ICD-9-CM: 346.10     Recurrent cold sores     ICD-10-CM: B00.1  ICD-9-CM: 054.9     Shortness of breath on exertion     ICD-10-CM: R06.02  ICD-9-CM: 786.05     Class 1 obesity with serious comorbidity and body mass index (BMI) of 32.0 to 32.9 in adult, unspecified obesity type     ICD-10-CM: E66.9, Z68.32  ICD-9-CM: 278.00, V85.32     Chronic pain of right ankle     ICD-10-CM: M25.571, G89.29  ICD-9-CM: 719.47, 338.29

## 2021-09-25 VITALS
BODY MASS INDEX: 31.97 KG/M2 | HEART RATE: 82 BPM | WEIGHT: 236 LBS | TEMPERATURE: 96.9 F | RESPIRATION RATE: 14 BRPM | SYSTOLIC BLOOD PRESSURE: 124 MMHG | OXYGEN SATURATION: 98 % | HEIGHT: 72 IN | DIASTOLIC BLOOD PRESSURE: 70 MMHG

## 2022-03-25 ENCOUNTER — LAB (OUTPATIENT)
Dept: FAMILY MEDICINE CLINIC | Facility: CLINIC | Age: 52
End: 2022-03-25

## 2022-03-25 DIAGNOSIS — K21.9 GASTROESOPHAGEAL REFLUX DISEASE WITHOUT ESOPHAGITIS: ICD-10-CM

## 2022-03-25 DIAGNOSIS — E03.8 HYPOTHYROIDISM DUE TO HASHIMOTO'S THYROIDITIS: ICD-10-CM

## 2022-03-25 DIAGNOSIS — K76.0 NON-ALCOHOLIC FATTY LIVER DISEASE: ICD-10-CM

## 2022-03-25 DIAGNOSIS — E78.5 DYSLIPIDEMIA: ICD-10-CM

## 2022-03-25 DIAGNOSIS — E06.3 HYPOTHYROIDISM DUE TO HASHIMOTO'S THYROIDITIS: ICD-10-CM

## 2022-03-25 DIAGNOSIS — R73.03 PREDIABETES: ICD-10-CM

## 2022-03-25 LAB
ALBUMIN SERPL-MCNC: 4.3 G/DL (ref 3.5–5.2)
ALBUMIN/GLOB SERPL: 1.5 G/DL
ALP SERPL-CCNC: 73 U/L (ref 39–117)
ALT SERPL W P-5'-P-CCNC: 43 U/L (ref 1–41)
ANION GAP SERPL CALCULATED.3IONS-SCNC: 10.2 MMOL/L (ref 5–15)
AST SERPL-CCNC: 20 U/L (ref 1–40)
BASOPHILS # BLD AUTO: 0.03 10*3/MM3 (ref 0–0.2)
BASOPHILS NFR BLD AUTO: 0.5 % (ref 0–1.5)
BILIRUB SERPL-MCNC: 0.6 MG/DL (ref 0–1.2)
BUN SERPL-MCNC: 16 MG/DL (ref 6–20)
BUN/CREAT SERPL: 14.8 (ref 7–25)
CALCIUM SPEC-SCNC: 9.6 MG/DL (ref 8.6–10.5)
CHLORIDE SERPL-SCNC: 106 MMOL/L (ref 98–107)
CHOLEST SERPL-MCNC: 208 MG/DL (ref 0–200)
CO2 SERPL-SCNC: 24.8 MMOL/L (ref 22–29)
CREAT SERPL-MCNC: 1.08 MG/DL (ref 0.76–1.27)
DEPRECATED RDW RBC AUTO: 40.7 FL (ref 37–54)
EGFRCR SERPLBLD CKD-EPI 2021: 82.6 ML/MIN/1.73
EOSINOPHIL # BLD AUTO: 0.2 10*3/MM3 (ref 0–0.4)
EOSINOPHIL NFR BLD AUTO: 3.3 % (ref 0.3–6.2)
ERYTHROCYTE [DISTWIDTH] IN BLOOD BY AUTOMATED COUNT: 12.6 % (ref 12.3–15.4)
GLOBULIN UR ELPH-MCNC: 2.8 GM/DL
GLUCOSE SERPL-MCNC: 109 MG/DL (ref 65–99)
HBA1C MFR BLD: 6.1 % (ref 4.8–5.6)
HCT VFR BLD AUTO: 46.5 % (ref 37.5–51)
HDLC SERPL-MCNC: 37 MG/DL (ref 40–60)
HGB BLD-MCNC: 15.7 G/DL (ref 13–17.7)
IMM GRANULOCYTES # BLD AUTO: 0.01 10*3/MM3 (ref 0–0.05)
IMM GRANULOCYTES NFR BLD AUTO: 0.2 % (ref 0–0.5)
LDLC SERPL CALC-MCNC: 144 MG/DL (ref 0–100)
LDLC/HDLC SERPL: 3.82 {RATIO}
LYMPHOCYTES # BLD AUTO: 1.86 10*3/MM3 (ref 0.7–3.1)
LYMPHOCYTES NFR BLD AUTO: 30.8 % (ref 19.6–45.3)
MCH RBC QN AUTO: 30 PG (ref 26.6–33)
MCHC RBC AUTO-ENTMCNC: 33.8 G/DL (ref 31.5–35.7)
MCV RBC AUTO: 88.9 FL (ref 79–97)
MONOCYTES # BLD AUTO: 0.59 10*3/MM3 (ref 0.1–0.9)
MONOCYTES NFR BLD AUTO: 9.8 % (ref 5–12)
NEUTROPHILS NFR BLD AUTO: 3.35 10*3/MM3 (ref 1.7–7)
NEUTROPHILS NFR BLD AUTO: 55.4 % (ref 42.7–76)
NRBC BLD AUTO-RTO: 0 /100 WBC (ref 0–0.2)
PLATELET # BLD AUTO: 263 10*3/MM3 (ref 140–450)
PMV BLD AUTO: 11.1 FL (ref 6–12)
POTASSIUM SERPL-SCNC: 4.7 MMOL/L (ref 3.5–5.2)
PROT SERPL-MCNC: 7.1 G/DL (ref 6–8.5)
RBC # BLD AUTO: 5.23 10*6/MM3 (ref 4.14–5.8)
SODIUM SERPL-SCNC: 141 MMOL/L (ref 136–145)
TRIGL SERPL-MCNC: 148 MG/DL (ref 0–150)
TSH SERPL DL<=0.05 MIU/L-ACNC: 1.67 UIU/ML (ref 0.27–4.2)
VLDLC SERPL-MCNC: 27 MG/DL (ref 5–40)
WBC NRBC COR # BLD: 6.04 10*3/MM3 (ref 3.4–10.8)

## 2022-03-25 PROCEDURE — 80061 LIPID PANEL: CPT | Performed by: GENERAL PRACTICE

## 2022-03-25 PROCEDURE — 83036 HEMOGLOBIN GLYCOSYLATED A1C: CPT | Performed by: GENERAL PRACTICE

## 2022-03-25 PROCEDURE — 80050 GENERAL HEALTH PANEL: CPT | Performed by: GENERAL PRACTICE

## 2022-03-25 PROCEDURE — 36415 COLL VENOUS BLD VENIPUNCTURE: CPT | Performed by: GENERAL PRACTICE

## 2022-04-01 ENCOUNTER — OFFICE VISIT (OUTPATIENT)
Dept: FAMILY MEDICINE CLINIC | Facility: CLINIC | Age: 52
End: 2022-04-01

## 2022-04-01 DIAGNOSIS — E66.9 CLASS 1 OBESITY WITH SERIOUS COMORBIDITY AND BODY MASS INDEX (BMI) OF 32.0 TO 32.9 IN ADULT, UNSPECIFIED OBESITY TYPE: ICD-10-CM

## 2022-04-01 DIAGNOSIS — R06.02 SHORTNESS OF BREATH ON EXERTION: ICD-10-CM

## 2022-04-01 DIAGNOSIS — R35.1 NOCTURIA: ICD-10-CM

## 2022-04-01 DIAGNOSIS — E03.8 HYPOTHYROIDISM DUE TO HASHIMOTO'S THYROIDITIS: ICD-10-CM

## 2022-04-01 DIAGNOSIS — K21.9 GASTROESOPHAGEAL REFLUX DISEASE WITHOUT ESOPHAGITIS: ICD-10-CM

## 2022-04-01 DIAGNOSIS — F32.A DEPRESSION, UNSPECIFIED DEPRESSION TYPE: ICD-10-CM

## 2022-04-01 DIAGNOSIS — M54.59 MECHANICAL LOW BACK PAIN: ICD-10-CM

## 2022-04-01 DIAGNOSIS — E78.5 DYSLIPIDEMIA: ICD-10-CM

## 2022-04-01 DIAGNOSIS — R73.03 PREDIABETES: ICD-10-CM

## 2022-04-01 DIAGNOSIS — K76.0 NON-ALCOHOLIC FATTY LIVER DISEASE: ICD-10-CM

## 2022-04-01 DIAGNOSIS — Z12.5 PROSTATE CANCER SCREENING: ICD-10-CM

## 2022-04-01 DIAGNOSIS — E06.3 HYPOTHYROIDISM DUE TO HASHIMOTO'S THYROIDITIS: ICD-10-CM

## 2022-04-01 DIAGNOSIS — Z00.00 HEALTHCARE MAINTENANCE: ICD-10-CM

## 2022-04-01 DIAGNOSIS — G43.009 MIGRAINE WITHOUT AURA AND WITHOUT STATUS MIGRAINOSUS, NOT INTRACTABLE: ICD-10-CM

## 2022-04-01 DIAGNOSIS — J30.2 CHRONIC SEASONAL ALLERGIC RHINITIS: Primary | ICD-10-CM

## 2022-04-01 PROCEDURE — 99214 OFFICE O/P EST MOD 30 MIN: CPT | Performed by: GENERAL PRACTICE

## 2022-04-01 NOTE — PROGRESS NOTES
Subjective   Fawad Morton is a 52 y.o. male.     Chief Complaint  He returns for a scheduled reassessment of multiple medical problems including shortness of breath on exertion, chronic allergic rhinitis, hypothyroidism, hyperlipidemia, and nocturia    History of Present Illness     Dyspnea with Exertion  He remains short of breath with above average exertion. Most episodes occur with sudden spurts of significant exertion however he has noted some decrease in his overall exercise tolerance with time.  There is no history of any chest pain, orthopnea, PND, cough, calf pain or swelling of the ankles. CT coronary angiogram done on 3/23/15 was unremarkable as was a stress test performed on 2/11/15  Lab Results   Component Value Date    WBC 6.04 03/25/2022    HGB 15.7 03/25/2022    HCT 46.5 03/25/2022    MCV 88.9 03/25/2022     03/25/2022     Chronic Allergic Rhinitis  He has a long history of intermittent sneezing, clear rhinorrhea, nasal congestion and postnasal drip. These symptoms are perennial with seasonal exacerbation. He has tried a number of antihistamines and nasal corticosteroids with a limited relief of symptoms.  He remains on OTC pseudoephedrine with some benefit and no apparent side effects    Hypothyroidism  Patient presents for evaluation of thyroid function. Symptoms consist of fatigue, weight gain, difficulty concentrating. The symptoms are moderate. The hypothyroidism is due to Hashimoto's disease. He remains on levothyroxine 88 qd.  Lab Results   Component Value Date    TSH 1.670 03/25/2022     Dyslipidemia  His compliance with treatment has been fair.  Outside of work he gets little exercise. He is currently being prescribed the following medication for his dyslipidemia - lifestyle modifcation.   Lab Results   Component Value Date    CHOL 208 (H) 03/25/2022    CHLPL 195 01/27/2016    TRIG 148 03/25/2022    HDL 37 (L) 03/25/2022     (H) 03/25/2022      Prediabetes  He felt metformin  affected his mood in a negative way.  Lab Results   Component Value Date    HGBA1C 6.10 (H) 03/25/2022     Nocturia  He gives a long history of nocturia.  He has experienced increasing daytime frequency associated with occasional urgency and a sense of incomplete voiding.  He denies any hesitancy or decrease in his stream and there is no history of dysuria or hematuria.    GERD  He has a long history of of heartburn.  He continues to deny any abdominal bloating, dysphagia, dyspepsia, hematemesis, hoarseness, melena, nausea or unexpected weight loss. Symptoms appear to be worsened by large meals and lying down. Risk factors present for GERD include caffeine use. Risk factors absent for GERD are tobacco abuse, alcohol use and NSAID use. Studies performed so far include upper endoscopy, result: positive for reflux esophagitis, upper GI, result: negative, esophageal manometry and ph/imedence: positive for reflux . Treatments tried so far include proton pump inhibitor: omeprazole. Results of treatment: no heartburn however he continues to have a sense of fullness at the back of his throat.     Labs  Lab Results   Component Value Date    GLUCOSE 109 (H) 03/25/2022    BUN 16 03/25/2022    CREATININE 1.08 03/25/2022    EGFRIFNONA 69 05/27/2021    BCR 14.8 03/25/2022    K 4.7 03/25/2022    CO2 24.8 03/25/2022    CALCIUM 9.6 03/25/2022    ALBUMIN 4.30 03/25/2022    LABIL2 1.5 01/27/2016    AST 20 03/25/2022    ALT 43 (H) 03/25/2022     Lab Results   Component Value Date    ALKPHOS 73 03/25/2022     The following portions of the patient's history were reviewed and updated as appropriate: allergies, current medications, past medical history, past social history and problem list.    Review of Systems   Constitutional: Positive for fatigue. Negative for appetite change, chills, fever and unexpected weight change.   HENT: Positive for postnasal drip, rhinorrhea and sneezing. Negative for congestion, ear pain and sore throat.          Sense of fullness back of throat.  Occasional cold sores   Eyes: Negative for visual disturbance.   Respiratory: Positive for shortness of breath. Negative for cough and wheezing.    Cardiovascular: Negative for chest pain, palpitations and leg swelling.   Gastrointestinal: Negative for abdominal pain, blood in stool, constipation, diarrhea, nausea and vomiting.   Endocrine: Negative for polydipsia and polyuria.   Genitourinary: Positive for difficulty urinating, frequency and urgency. Negative for dysuria and hematuria.        Nocturia x 2-3, intermittent erectile dysfunction   Musculoskeletal: Positive for arthralgias (right ankle). Negative for back pain, joint swelling, myalgias and neck pain.   Skin: Negative for rash.   Neurological: Negative for tremors, weakness, numbness and headaches.   Psychiatric/Behavioral: Negative for decreased concentration, dysphoric mood, sleep disturbance and suicidal ideas. The patient is not nervous/anxious.         Loss of interest in activities     Objective   Physical Exam  Constitutional:       General: He is not in acute distress.     Appearance: Normal appearance. He is well-developed. He is not ill-appearing or diaphoretic.      Comments: Bright and in fair spirits. No apparent distress. No pallor, jaundice, diaphoresis, or cyanosis.   HENT:      Head: Atraumatic.      Right Ear: Tympanic membrane, ear canal and external ear normal.      Left Ear: Tympanic membrane, ear canal and external ear normal.   Eyes:      Conjunctiva/sclera: Conjunctivae normal.   Neck:      Thyroid: No thyroid mass or thyromegaly.      Vascular: No carotid bruit or JVD.      Trachea: Trachea normal. No tracheal deviation.   Cardiovascular:      Rate and Rhythm: Normal rate and regular rhythm.      Heart sounds: Normal heart sounds, S1 normal and S2 normal. No murmur heard.    No gallop. No S3 or S4 sounds.   Pulmonary:      Effort: Pulmonary effort is normal.      Breath sounds: Normal breath  sounds.   Chest:   Breasts:      Right: No supraclavicular adenopathy.      Left: No supraclavicular adenopathy.       Abdominal:      General: Bowel sounds are normal. There is no distension.   Musculoskeletal:      Right lower leg: No edema.      Left lower leg: No edema.   Lymphadenopathy:      Head:      Right side of head: No submental, submandibular, tonsillar, preauricular, posterior auricular or occipital adenopathy.      Left side of head: No submental, submandibular, tonsillar, preauricular, posterior auricular or occipital adenopathy.      Cervical: No cervical adenopathy.      Upper Body:      Right upper body: No supraclavicular adenopathy.      Left upper body: No supraclavicular adenopathy.   Skin:     General: Skin is warm and dry.      Coloration: Skin is not cyanotic, jaundiced or pale.      Findings: No rash.      Nails: There is no clubbing.   Neurological:      Mental Status: He is alert and oriented to person, place, and time.      Cranial Nerves: No cranial nerve deficit.      Motor: No tremor.      Coordination: Coordination normal.      Gait: Gait normal.   Psychiatric:         Attention and Perception: Attention normal.         Mood and Affect: Mood normal.         Speech: Speech normal.         Behavior: Behavior normal.         Thought Content: Thought content normal.       Assessment/Plan   Problems Addressed this Visit        Allergies and Adverse Reactions    Chronic seasonal allergic rhinitis   Reminded regarding allergen avoidance.  Continue current medication  Recommended an allergy and/or ENT assessment.  Patient will consider  Encouraged to report if any worse or if any new symptoms or concerns.       Cardiac and Vasculature    Dyslipidemia  Encouraged to continue to work on his diet and exercise plan.  Scheduled for updated labs just prior to his return in 6 months    Relevant Orders    Comprehensive Metabolic Panel    Lipid Panel    Shortness of breath on exertion  Likely due to  deconditioning  Recommended a cardiology opinion.  Patient will consider  Encouraged to report if any worse or if any new symptoms or concerns.       Endocrine and Metabolic    Class 1 obesity with serious comorbidity and body mass index (BMI) of 32.0 to 32.9 in adult    Hypothyroidism due to Hashimoto's thyroiditis  Clinically and bio-chemically euthyroid.  Continue current medication.    Relevant Orders    TSH    Prediabetes  As above.  Will continue to monitor    Relevant Orders    Hemoglobin A1c       Gastrointestinal Abdominal     Gastroesophageal reflux disease without esophagitis   Symptoms are currently stable.  Reminded regarding lifestyle modification.    Relevant Orders    CBC & Differential    Non-alcoholic fatty liver disease       Genitourinary and Reproductive     Nocturia  With increasing daytime frequency, urgency, and an intermittent sense of incomplete voiding  Recommended a urology assessment.  Patient will consider    Relevant Orders    Urinalysis With Microscopic If Indicated (No Culture) - Urine, Clean Catch  PSA SCREENING       Health Encounters    Healthcare maintenance  Patient has received a third dose of the COVID-19 vaccine.  Reminded that he is due for Shingrix.  Reviewed the potential benefits and risks of colon and prostate cancer screening.  Patient like to proceed and referral for the former will be made    Relevant Orders    Ambulatory Referral to General Surgery    PSA SCREENING       Mental Health    Depression       Musculoskeletal and Injuries    Mechanical low back pain       Neuro    Migraine without aura and without status migrainosus, not intractable         Diagnoses       Codes Comments    Chronic seasonal allergic rhinitis    -  Primary ICD-10-CM: J30.2  ICD-9-CM: 477.8     Dyslipidemia     ICD-10-CM: E78.5  ICD-9-CM: 272.4     Class 1 obesity with serious comorbidity and body mass index (BMI) of 32.0 to 32.9 in adult, unspecified obesity type     ICD-10-CM: E66.9,  Z68.32  ICD-9-CM: 278.00, V85.32     Hypothyroidism due to Hashimoto's thyroiditis     ICD-10-CM: E03.8, E06.3  ICD-9-CM: 244.8, 245.2     Prediabetes     ICD-10-CM: R73.03  ICD-9-CM: 790.29     Gastroesophageal reflux disease without esophagitis     ICD-10-CM: K21.9  ICD-9-CM: 530.81     Non-alcoholic fatty liver disease     ICD-10-CM: K76.0  ICD-9-CM: 571.8     Healthcare maintenance     ICD-10-CM: Z00.00  ICD-9-CM: V70.0     Depression, unspecified depression type     ICD-10-CM: F32.A  ICD-9-CM: 311     Mechanical low back pain     ICD-10-CM: M54.59  ICD-9-CM: 724.2     Migraine without aura and without status migrainosus, not intractable     ICD-10-CM: G43.009  ICD-9-CM: 346.10     Prostate cancer screening     ICD-10-CM: Z12.5  ICD-9-CM: V76.44     Shortness of breath on exertion     ICD-10-CM: R06.02  ICD-9-CM: 786.05     Nocturia     ICD-10-CM: R35.1  ICD-9-CM: 788.43

## 2022-04-02 VITALS
DIASTOLIC BLOOD PRESSURE: 70 MMHG | HEIGHT: 72 IN | HEART RATE: 84 BPM | OXYGEN SATURATION: 97 % | RESPIRATION RATE: 14 BRPM | BODY MASS INDEX: 32.78 KG/M2 | TEMPERATURE: 97.7 F | SYSTOLIC BLOOD PRESSURE: 126 MMHG | WEIGHT: 242 LBS

## 2022-04-26 ENCOUNTER — HOSPITAL ENCOUNTER (OUTPATIENT)
Facility: HOSPITAL | Age: 52
Setting detail: HOSPITAL OUTPATIENT SURGERY
End: 2022-04-26
Attending: SURGERY | Admitting: SURGERY

## 2022-04-26 ENCOUNTER — OFFICE VISIT (OUTPATIENT)
Dept: SURGERY | Facility: CLINIC | Age: 52
End: 2022-04-26

## 2022-04-26 ENCOUNTER — CLINICAL SUPPORT (OUTPATIENT)
Dept: FAMILY MEDICINE CLINIC | Facility: CLINIC | Age: 52
End: 2022-04-26

## 2022-04-26 VITALS
DIASTOLIC BLOOD PRESSURE: 90 MMHG | HEART RATE: 76 BPM | WEIGHT: 239 LBS | HEIGHT: 72 IN | SYSTOLIC BLOOD PRESSURE: 138 MMHG | BODY MASS INDEX: 32.37 KG/M2

## 2022-04-26 DIAGNOSIS — Z23 ENCOUNTER FOR IMMUNIZATION: ICD-10-CM

## 2022-04-26 DIAGNOSIS — Z00.00 HEALTHCARE MAINTENANCE: Primary | ICD-10-CM

## 2022-04-26 DIAGNOSIS — Z12.11 COLON CANCER SCREENING: Primary | ICD-10-CM

## 2022-04-26 PROCEDURE — 90471 IMMUNIZATION ADMIN: CPT | Performed by: GENERAL PRACTICE

## 2022-04-26 PROCEDURE — 90746 HEPB VACCINE 3 DOSE ADULT IM: CPT | Performed by: GENERAL PRACTICE

## 2022-04-26 PROCEDURE — S0285 CNSLT BEFORE SCREEN COLONOSC: HCPCS | Performed by: SURGERY

## 2022-04-26 RX ORDER — SODIUM, POTASSIUM,MAG SULFATES 17.5-3.13G
2 SOLUTION, RECONSTITUTED, ORAL ORAL EVERY 12 HOURS
Qty: 177 ML | Refills: 0 | Status: SHIPPED | OUTPATIENT
Start: 2022-04-26 | End: 2022-10-08

## 2022-04-26 NOTE — PROGRESS NOTES
Injection  Injection performed in left deltoid by Kaia Magaña MA. Patient tolerated the procedure well without complications.  04/26/22   Kaia Magaña MA

## 2022-04-26 NOTE — PROGRESS NOTES
Subjective   Fawad Morton is a 52 y.o. male is being seen for consultation today at the request of Ben Bowie MD    Fawad Morton is a 52 y.o. male With need for screening colonoscopy.  No previous endoscopy performed.  No family history of colon cancer.  No personal history of cancer or symptomatology.  No unintentional weight loss or change in bowel habits or stool caliber.      Past Medical History:   Diagnosis Date   • Allergic sinusitis    • GERD (gastroesophageal reflux disease)    • Hyperlipidemia    • Hypogonadism, male    • Hypothyroidism    • Mechanical low back pain    • Migraine    • Non-alcoholic fatty liver disease    • Prediabetes    • Situational anxiety    • Skin tag        Family History   Problem Relation Age of Onset   • No Known Problems Mother        Social History     Socioeconomic History   • Marital status:      Spouse name: andriy   • Number of children: 4   • Years of education: 18   Tobacco Use   • Smoking status: Former Smoker   • Smokeless tobacco: Never Used   Vaping Use   • Vaping Use: Never used   Substance and Sexual Activity   • Alcohol use: Yes     Alcohol/week: 1.0 standard drink     Types: 1 Cans of beer per week   • Drug use: No   • Sexual activity: Defer       History reviewed. No pertinent surgical history.    Review of Systems   Constitutional: Negative for activity change, appetite change, chills and fever.   HENT: Negative for sore throat and trouble swallowing.    Eyes: Negative for visual disturbance.   Respiratory: Negative for cough and shortness of breath.    Cardiovascular: Negative for chest pain and palpitations.   Gastrointestinal: Negative for abdominal distention, abdominal pain, blood in stool, constipation, diarrhea, nausea and vomiting.   Endocrine: Negative for cold intolerance and heat intolerance.   Genitourinary: Negative for dysuria.   Musculoskeletal: Negative for joint swelling.   Skin: Negative for color change, rash and  "wound.   Allergic/Immunologic: Negative for immunocompromised state.   Neurological: Negative for dizziness, seizures, weakness and headaches.   Hematological: Negative for adenopathy. Does not bruise/bleed easily.   Psychiatric/Behavioral: Negative for agitation and confusion.         /90   Pulse 76   Ht 182.9 cm (72.01\")   Wt 108 kg (239 lb)   BMI 32.41 kg/m²   Objective   Physical Exam  Constitutional:       Appearance: He is well-developed.   HENT:      Head: Normocephalic and atraumatic.   Eyes:      Conjunctiva/sclera: Conjunctivae normal.      Pupils: Pupils are equal, round, and reactive to light.   Neck:      Thyroid: No thyromegaly.      Vascular: No JVD.      Trachea: No tracheal deviation.   Cardiovascular:      Rate and Rhythm: Normal rate and regular rhythm.      Heart sounds: No murmur heard.    No friction rub. No gallop.   Pulmonary:      Effort: Pulmonary effort is normal.      Breath sounds: Normal breath sounds.   Abdominal:      General: There is no distension.      Palpations: Abdomen is soft. There is no hepatomegaly or splenomegaly.      Tenderness: There is no abdominal tenderness.      Hernia: No hernia is present.   Musculoskeletal:         General: No deformity. Normal range of motion.      Cervical back: Neck supple.   Skin:     General: Skin is warm and dry.   Neurological:      Mental Status: He is alert and oriented to person, place, and time.               Assessment   Diagnoses and all orders for this visit:    1. Colon cancer screening (Primary)  -     Case Request; Standing  -     COVID PRE-OP / PRE-PROCEDURE SCREENING ORDER (NO ISOLATION) - Swab, Nasopharynx; Future  -     Case Request    Other orders  -     Follow anesthesia standing orders.  -     Provide NPO Instructions to Patient; Future  -     Chlorhexidine Skin Prep; Future  -     sodium-potassium-magnesium sulfates (Suprep Bowel Prep Kit) 17.5-3.13-1.6 GM/177ML solution oral solution; Take 2 bottles by mouth " Every 12 (Twelve) Hours.  Dispense: 177 mL; Refill: 0      Fawad D  is a 52 y.o. male with need for screening colonoscopy.  Risk and benefits of been discussed with the patient he will proceed.    BMI is >= 30 and <= 34.9 (Class 1 obesity). The following options were offered after discussion: nutrition counseling/recommendations

## 2022-05-19 ENCOUNTER — TELEPHONE (OUTPATIENT)
Dept: SURGERY | Facility: CLINIC | Age: 52
End: 2022-05-19

## 2022-05-19 DIAGNOSIS — Z20.822 ENCOUNTER FOR PREPROCEDURE SCREENING LABORATORY TESTING FOR COVID-19: Primary | ICD-10-CM

## 2022-05-19 DIAGNOSIS — Z01.812 ENCOUNTER FOR PREPROCEDURE SCREENING LABORATORY TESTING FOR COVID-19: Primary | ICD-10-CM

## 2022-05-19 NOTE — TELEPHONE ENCOUNTER
Talked with patients wife and gave information on time ,bowel prep and covid test, since they will not be in town on Monday  I have put in a emergent covid test for him, patients wife voiced understanding.

## 2022-09-30 ENCOUNTER — LAB (OUTPATIENT)
Dept: FAMILY MEDICINE CLINIC | Facility: CLINIC | Age: 52
End: 2022-09-30

## 2022-09-30 DIAGNOSIS — E03.8 HYPOTHYROIDISM DUE TO HASHIMOTO'S THYROIDITIS: ICD-10-CM

## 2022-09-30 DIAGNOSIS — E78.5 DYSLIPIDEMIA: ICD-10-CM

## 2022-09-30 DIAGNOSIS — R73.03 PREDIABETES: ICD-10-CM

## 2022-09-30 DIAGNOSIS — Z00.00 HEALTHCARE MAINTENANCE: ICD-10-CM

## 2022-09-30 DIAGNOSIS — E06.3 HYPOTHYROIDISM DUE TO HASHIMOTO'S THYROIDITIS: ICD-10-CM

## 2022-09-30 DIAGNOSIS — K21.9 GASTROESOPHAGEAL REFLUX DISEASE WITHOUT ESOPHAGITIS: ICD-10-CM

## 2022-09-30 DIAGNOSIS — R35.1 NOCTURIA: ICD-10-CM

## 2022-09-30 DIAGNOSIS — Z12.5 PROSTATE CANCER SCREENING: ICD-10-CM

## 2022-09-30 LAB
ALBUMIN SERPL-MCNC: 4.3 G/DL (ref 3.5–5.2)
ALBUMIN/GLOB SERPL: 1.8 G/DL
ALP SERPL-CCNC: 70 U/L (ref 39–117)
ALT SERPL W P-5'-P-CCNC: 44 U/L (ref 1–41)
ANION GAP SERPL CALCULATED.3IONS-SCNC: 11.5 MMOL/L (ref 5–15)
AST SERPL-CCNC: 23 U/L (ref 1–40)
BASOPHILS # BLD AUTO: 0.04 10*3/MM3 (ref 0–0.2)
BASOPHILS NFR BLD AUTO: 0.6 % (ref 0–1.5)
BILIRUB SERPL-MCNC: 0.6 MG/DL (ref 0–1.2)
BUN SERPL-MCNC: 15 MG/DL (ref 6–20)
BUN/CREAT SERPL: 12.5 (ref 7–25)
CALCIUM SPEC-SCNC: 9.7 MG/DL (ref 8.6–10.5)
CHLORIDE SERPL-SCNC: 103 MMOL/L (ref 98–107)
CHOLEST SERPL-MCNC: 205 MG/DL (ref 0–200)
CO2 SERPL-SCNC: 25.5 MMOL/L (ref 22–29)
CREAT SERPL-MCNC: 1.2 MG/DL (ref 0.76–1.27)
DEPRECATED RDW RBC AUTO: 42.2 FL (ref 37–54)
EGFRCR SERPLBLD CKD-EPI 2021: 72.8 ML/MIN/1.73
EOSINOPHIL # BLD AUTO: 0.19 10*3/MM3 (ref 0–0.4)
EOSINOPHIL NFR BLD AUTO: 2.7 % (ref 0.3–6.2)
ERYTHROCYTE [DISTWIDTH] IN BLOOD BY AUTOMATED COUNT: 12.9 % (ref 12.3–15.4)
GLOBULIN UR ELPH-MCNC: 2.4 GM/DL
GLUCOSE SERPL-MCNC: 102 MG/DL (ref 65–99)
HBA1C MFR BLD: 5.9 % (ref 4.8–5.6)
HCT VFR BLD AUTO: 46.4 % (ref 37.5–51)
HDLC SERPL-MCNC: 40 MG/DL (ref 40–60)
HGB BLD-MCNC: 15.8 G/DL (ref 13–17.7)
IMM GRANULOCYTES # BLD AUTO: 0.03 10*3/MM3 (ref 0–0.05)
IMM GRANULOCYTES NFR BLD AUTO: 0.4 % (ref 0–0.5)
LDLC SERPL CALC-MCNC: 144 MG/DL (ref 0–100)
LDLC/HDLC SERPL: 3.54 {RATIO}
LYMPHOCYTES # BLD AUTO: 2.58 10*3/MM3 (ref 0.7–3.1)
LYMPHOCYTES NFR BLD AUTO: 37.2 % (ref 19.6–45.3)
MCH RBC QN AUTO: 30.7 PG (ref 26.6–33)
MCHC RBC AUTO-ENTMCNC: 34.1 G/DL (ref 31.5–35.7)
MCV RBC AUTO: 90.1 FL (ref 79–97)
MONOCYTES # BLD AUTO: 0.65 10*3/MM3 (ref 0.1–0.9)
MONOCYTES NFR BLD AUTO: 9.4 % (ref 5–12)
NEUTROPHILS NFR BLD AUTO: 3.45 10*3/MM3 (ref 1.7–7)
NEUTROPHILS NFR BLD AUTO: 49.7 % (ref 42.7–76)
NRBC BLD AUTO-RTO: 0 /100 WBC (ref 0–0.2)
PLATELET # BLD AUTO: 265 10*3/MM3 (ref 140–450)
PMV BLD AUTO: 10.9 FL (ref 6–12)
POTASSIUM SERPL-SCNC: 4.5 MMOL/L (ref 3.5–5.2)
PROT SERPL-MCNC: 6.7 G/DL (ref 6–8.5)
PSA SERPL-MCNC: 0.54 NG/ML (ref 0–4)
RBC # BLD AUTO: 5.15 10*6/MM3 (ref 4.14–5.8)
SODIUM SERPL-SCNC: 140 MMOL/L (ref 136–145)
TRIGL SERPL-MCNC: 118 MG/DL (ref 0–150)
TSH SERPL DL<=0.05 MIU/L-ACNC: 2.05 UIU/ML (ref 0.27–4.2)
VLDLC SERPL-MCNC: 21 MG/DL (ref 5–40)
WBC NRBC COR # BLD: 6.94 10*3/MM3 (ref 3.4–10.8)

## 2022-09-30 PROCEDURE — 80050 GENERAL HEALTH PANEL: CPT | Performed by: GENERAL PRACTICE

## 2022-09-30 PROCEDURE — 80061 LIPID PANEL: CPT | Performed by: GENERAL PRACTICE

## 2022-09-30 PROCEDURE — 81003 URINALYSIS AUTO W/O SCOPE: CPT | Performed by: GENERAL PRACTICE

## 2022-09-30 PROCEDURE — 83036 HEMOGLOBIN GLYCOSYLATED A1C: CPT | Performed by: GENERAL PRACTICE

## 2022-09-30 PROCEDURE — G0103 PSA SCREENING: HCPCS | Performed by: GENERAL PRACTICE

## 2022-10-01 LAB
BILIRUB UR QL STRIP: NEGATIVE
CLARITY UR: CLEAR
COLOR UR: YELLOW
GLUCOSE UR STRIP-MCNC: NEGATIVE MG/DL
HGB UR QL STRIP.AUTO: NEGATIVE
KETONES UR QL STRIP: NEGATIVE
LEUKOCYTE ESTERASE UR QL STRIP.AUTO: NEGATIVE
NITRITE UR QL STRIP: NEGATIVE
PH UR STRIP.AUTO: 6.5 [PH] (ref 5–8)
PROT UR QL STRIP: NEGATIVE
SP GR UR STRIP: 1.02 (ref 1–1.03)
UROBILINOGEN UR QL STRIP: NORMAL

## 2022-10-07 ENCOUNTER — OFFICE VISIT (OUTPATIENT)
Dept: FAMILY MEDICINE CLINIC | Facility: CLINIC | Age: 52
End: 2022-10-07

## 2022-10-07 DIAGNOSIS — E06.3 HYPOTHYROIDISM DUE TO HASHIMOTO'S THYROIDITIS: ICD-10-CM

## 2022-10-07 DIAGNOSIS — M54.59 MECHANICAL LOW BACK PAIN: ICD-10-CM

## 2022-10-07 DIAGNOSIS — M79.642 LEFT HAND PAIN: ICD-10-CM

## 2022-10-07 DIAGNOSIS — Z00.00 HEALTHCARE MAINTENANCE: ICD-10-CM

## 2022-10-07 DIAGNOSIS — E78.2 MIXED HYPERLIPIDEMIA: ICD-10-CM

## 2022-10-07 DIAGNOSIS — K21.9 GASTROESOPHAGEAL REFLUX DISEASE WITHOUT ESOPHAGITIS: ICD-10-CM

## 2022-10-07 DIAGNOSIS — N39.43 POST-VOID DRIBBLING: ICD-10-CM

## 2022-10-07 DIAGNOSIS — K76.0 NON-ALCOHOLIC FATTY LIVER DISEASE: ICD-10-CM

## 2022-10-07 DIAGNOSIS — R73.03 PREDIABETES: ICD-10-CM

## 2022-10-07 DIAGNOSIS — E03.8 HYPOTHYROIDISM DUE TO HASHIMOTO'S THYROIDITIS: ICD-10-CM

## 2022-10-07 DIAGNOSIS — J30.2 CHRONIC SEASONAL ALLERGIC RHINITIS: Primary | ICD-10-CM

## 2022-10-07 DIAGNOSIS — M25.561 ACUTE PAIN OF RIGHT KNEE: ICD-10-CM

## 2022-10-07 DIAGNOSIS — E66.9 CLASS 1 OBESITY WITH SERIOUS COMORBIDITY AND BODY MASS INDEX (BMI) OF 32.0 TO 32.9 IN ADULT, UNSPECIFIED OBESITY TYPE: ICD-10-CM

## 2022-10-07 DIAGNOSIS — R06.02 SHORTNESS OF BREATH ON EXERTION: ICD-10-CM

## 2022-10-07 DIAGNOSIS — L98.9 SKIN LESIONS: ICD-10-CM

## 2022-10-07 DIAGNOSIS — G43.009 MIGRAINE WITHOUT AURA AND WITHOUT STATUS MIGRAINOSUS, NOT INTRACTABLE: ICD-10-CM

## 2022-10-07 PROBLEM — R35.1 NOCTURIA: Status: RESOLVED | Noted: 2017-11-11 | Resolved: 2022-10-07

## 2022-10-07 PROCEDURE — 99214 OFFICE O/P EST MOD 30 MIN: CPT | Performed by: GENERAL PRACTICE

## 2022-10-07 RX ORDER — LEVOTHYROXINE SODIUM 88 MCG
88 TABLET ORAL DAILY
Qty: 90 TABLET | Refills: 3 | Status: SHIPPED | OUTPATIENT
Start: 2022-10-07

## 2022-10-08 VITALS
OXYGEN SATURATION: 96 % | HEART RATE: 80 BPM | SYSTOLIC BLOOD PRESSURE: 124 MMHG | RESPIRATION RATE: 16 BRPM | DIASTOLIC BLOOD PRESSURE: 65 MMHG | TEMPERATURE: 98.4 F | BODY MASS INDEX: 32.75 KG/M2 | HEIGHT: 72 IN | WEIGHT: 241.8 LBS

## 2022-10-08 PROBLEM — M25.561 ACUTE PAIN OF RIGHT KNEE: Status: ACTIVE | Noted: 2022-10-08

## 2022-10-12 DIAGNOSIS — R73.03 PREDIABETES: Primary | ICD-10-CM

## 2022-10-12 RX ORDER — SEMAGLUTIDE 1.34 MG/ML
0.25 INJECTION, SOLUTION SUBCUTANEOUS WEEKLY
Qty: 1.5 ML | Refills: 2 | Status: SHIPPED | OUTPATIENT
Start: 2022-10-12 | End: 2022-12-06 | Stop reason: SDUPTHER

## 2022-11-08 ENCOUNTER — OFFICE VISIT (OUTPATIENT)
Dept: UROLOGY | Facility: CLINIC | Age: 52
End: 2022-11-08

## 2022-11-08 VITALS — HEIGHT: 72 IN | BODY MASS INDEX: 31.83 KG/M2 | WEIGHT: 235 LBS

## 2022-11-08 DIAGNOSIS — N39.43 BENIGN PROSTATIC HYPERPLASIA WITH POST-VOID DRIBBLING: ICD-10-CM

## 2022-11-08 DIAGNOSIS — N40.1 BENIGN PROSTATIC HYPERPLASIA WITH POST-VOID DRIBBLING: ICD-10-CM

## 2022-11-08 DIAGNOSIS — N52.9 ERECTILE DYSFUNCTION, UNSPECIFIED ERECTILE DYSFUNCTION TYPE: ICD-10-CM

## 2022-11-08 DIAGNOSIS — N39.43 POST-VOID DRIBBLING: Primary | ICD-10-CM

## 2022-11-08 PROCEDURE — 99203 OFFICE O/P NEW LOW 30 MIN: CPT

## 2022-11-08 RX ORDER — TAMSULOSIN HYDROCHLORIDE 0.4 MG/1
1 CAPSULE ORAL DAILY
Qty: 30 CAPSULE | Refills: 0 | Status: SHIPPED | OUTPATIENT
Start: 2022-11-08 | End: 2022-11-08

## 2022-11-08 RX ORDER — DOXYCYCLINE 100 MG/1
CAPSULE ORAL
COMMUNITY
Start: 2022-11-04 | End: 2022-12-20

## 2022-11-08 RX ORDER — TADALAFIL 20 MG/1
20 TABLET ORAL AS NEEDED
Qty: 20 TABLET | Refills: 1 | Status: SHIPPED | OUTPATIENT
Start: 2022-11-08

## 2022-11-08 RX ORDER — TADALAFIL 5 MG/1
5 TABLET ORAL DAILY PRN
Qty: 30 TABLET | Refills: 0 | Status: SHIPPED | OUTPATIENT
Start: 2022-11-08

## 2022-11-08 RX ORDER — TAMSULOSIN HYDROCHLORIDE 0.4 MG/1
1 CAPSULE ORAL DAILY
Qty: 90 CAPSULE | Refills: 3 | Status: SHIPPED | OUTPATIENT
Start: 2022-11-08 | End: 2022-12-20

## 2022-11-08 NOTE — PROGRESS NOTES
"Chief Complaint:    Chief Complaint   Patient presents with   • Dribbling    • Erectile Dysfunction       Vital Signs:   Ht 182.9 cm (72.01\")   Wt 107 kg (235 lb)   BMI 31.86 kg/m²   Body mass index is 31.86 kg/m².      HPI:  Fawad Morton is a 52 y.o. male who presents today for initial evaluation     History of Present Illness  Mr. Morton presents to the clinic for initial evaluation of postvoid dribbling and erectile dysfunction.  Patient states that he also has difficulty starting urination and must sit down to urinate fully.  He reports that he often stops and starts again when urinating.  He has a current IPSS score of 13.  Reports some mild symptoms urinary incontinence and uses 1-2 pads per day.  His most recent PSA was normal at 0.544.  Denies any family history of prostate cancer.  He has not tried any medication previously for BPH.  He also complains of erectile dysfunction.  States he has difficulty obtaining and maintaining erection with decreased sexual desire.  He has been on testosterone testosterone replacement therapy in the past with some improvement in symptoms.  He has never tried Viagra or Cialis.  Denies any painful erection, curvature of the penis, dyspareunia, or ejaculation problems.      Past Medical History:  Past Medical History:   Diagnosis Date   • Allergic sinusitis    • GERD (gastroesophageal reflux disease)    • Hyperlipidemia    • Hypogonadism, male    • Hypothyroidism    • Mechanical low back pain    • Migraine    • Non-alcoholic fatty liver disease    • Prediabetes    • Situational anxiety    • Skin tag        Current Meds:  Current Outpatient Medications   Medication Sig Dispense Refill   • doxycycline (MONODOX) 100 MG capsule TAKE 1 CAPSULE BY MOUTH EVERY 12 HOURS FOR 14 DAYS     • ipratropium (ATROVENT) 0.06 % nasal spray 2 sprays into the nostril(s) as directed by provider 4 (Four) Times a Day. 15 mL 5   • Pseudoephedrine HCl  MG tablet sustained-release 24 hour Take 1 " tablet by mouth Daily As Needed (nasal congestion). 30 each 5   • Semaglutide,0.25 or 0.5MG/DOS, (Ozempic, 0.25 or 0.5 MG/DOSE,) 2 MG/1.5ML solution pen-injector Inject 0.25 mg under the skin into the appropriate area as directed 1 (One) Time Per Week. 1.5 mL 2   • Synthroid 88 MCG tablet Take 1 tablet by mouth Daily. 90 tablet 3   • valACYclovir (Valtrex) 1000 MG tablet 2 tablets every 12 hours for one day at the first sign of a cold sore 12 tablet 3   • tadalafil (Cialis) 20 MG tablet Take 1 tablet by mouth As Needed for Erectile Dysfunction. 20 tablet 1   • tadalafil (Cialis) 5 MG tablet Take 1 tablet by mouth Daily As Needed for Erectile Dysfunction. Take one Daily 30 tablet 0   • tamsulosin (FLOMAX) 0.4 MG capsule 24 hr capsule TAKE 1 CAPSULE BY MOUTH DAILY 90 capsule 3     No current facility-administered medications for this visit.        Allergies:   Allergies   Allergen Reactions   • Penicillins         Past Surgical History:  History reviewed. No pertinent surgical history.    Social History:  Social History     Socioeconomic History   • Marital status:      Spouse name: andriy   • Number of children: 4   • Years of education: 18   Tobacco Use   • Smoking status: Former   • Smokeless tobacco: Never   Vaping Use   • Vaping Use: Never used   Substance and Sexual Activity   • Alcohol use: Yes     Alcohol/week: 1.0 standard drink     Types: 1 Cans of beer per week   • Drug use: No   • Sexual activity: Defer       Family History:  Family History   Problem Relation Age of Onset   • No Known Problems Mother        Review of Systems:  Review of Systems   Constitutional: Positive for fatigue. Negative for chills, fever and unexpected weight change.   Respiratory: Negative for cough, chest tightness, shortness of breath and wheezing.    Cardiovascular: Negative for chest pain and leg swelling.   Gastrointestinal: Negative for abdominal pain, constipation, diarrhea, nausea and vomiting.   Genitourinary:  Negative for difficulty urinating, dysuria, frequency and urgency.   Musculoskeletal: Negative for back pain and joint swelling.   Skin: Negative for rash.   Neurological: Negative for dizziness and headaches.   Psychiatric/Behavioral: Negative for confusion and suicidal ideas.       Physical Exam:  Physical Exam  Constitutional:       General: He is not in acute distress.     Appearance: Normal appearance.   HENT:      Head: Normocephalic and atraumatic.      Nose: Nose normal.      Mouth/Throat:      Mouth: Mucous membranes are moist.   Eyes:      Conjunctiva/sclera: Conjunctivae normal.   Cardiovascular:      Rate and Rhythm: Normal rate and regular rhythm.      Pulses: Normal pulses.      Heart sounds: Normal heart sounds.   Pulmonary:      Effort: Pulmonary effort is normal.      Breath sounds: Normal breath sounds.   Abdominal:      General: Bowel sounds are normal.      Palpations: Abdomen is soft.   Musculoskeletal:         General: Normal range of motion.      Cervical back: Normal range of motion.   Skin:     General: Skin is warm.   Neurological:      General: No focal deficit present.      Mental Status: He is alert and oriented to person, place, and time.   Psychiatric:         Mood and Affect: Mood normal.         Behavior: Behavior normal.         Thought Content: Thought content normal.         Judgment: Judgment normal.         IPSS Questionnaire (AUA-7):  IPSS Questionnaire (AUA-7):                  IPSS Questionnaire (AUA-7):  Over the past month…    1)  Incomplete Emptying  How often have you had a sensation of not emptying your bladder?  0 - Not at all   2)  Frequency  How often have you had to urinate less than every two hours? 0 - Not at all   3)  Intermittency  How often have you found you stopped and started again several times when you urinated?  5 - Almost always   4) Urgency  How often have you found it difficult to postpone urination?  0 - Not at all   5) Weak Stream  How often have you  had a weak urinary stream?  5 - Almost always   6) Straining  How often have you had to push or strain to begin urination?  0 - Not at all   7) Nocturia  How many times did you typically get up at night to urinate?  3 - 3 times   Total Score:  13   The International Prostate Symptom Score (IPSS) is used to screen, diagnose, track symptoms of benign prostatic hyperplasia (BPH).    0-7 pts (Mild Symptoms)  / 8-19 pts (Moderate) / 20-35 (Severe)    Quality of life due to urinary symptoms:  If you were to spend the rest of your life with your urinary condition the way it is now, how would you feel about that? 5-Unhappy   Urine Leakage (Incontinence) 2-Mild (More than a few drops a day, 1-2 pads/day)       Recent Image (CT and/or KUB):   CT Abdomen and Pelvis: No results found for this or any previous visit.     CT Stone Protocol: No results found for this or any previous visit.     KUB: No results found for this or any previous visit.       Labs:  Brief Urine Lab Results  (Last result in the past 365 days)      Color   Clarity   Blood   Leuk Est   Nitrite   Protein   CREAT   Urine HCG        09/30/22 1043 Yellow   Clear   Negative   Negative   Negative   Negative               Lab on 09/30/2022   Component Date Value Ref Range Status   • Glucose 09/30/2022 102 (H)  65 - 99 mg/dL Final   • BUN 09/30/2022 15  6 - 20 mg/dL Final   • Creatinine 09/30/2022 1.20  0.76 - 1.27 mg/dL Final   • Sodium 09/30/2022 140  136 - 145 mmol/L Final   • Potassium 09/30/2022 4.5  3.5 - 5.2 mmol/L Final   • Chloride 09/30/2022 103  98 - 107 mmol/L Final   • CO2 09/30/2022 25.5  22.0 - 29.0 mmol/L Final   • Calcium 09/30/2022 9.7  8.6 - 10.5 mg/dL Final   • Total Protein 09/30/2022 6.7  6.0 - 8.5 g/dL Final   • Albumin 09/30/2022 4.30  3.50 - 5.20 g/dL Final   • ALT (SGPT) 09/30/2022 44 (H)  1 - 41 U/L Final   • AST (SGOT) 09/30/2022 23  1 - 40 U/L Final   • Alkaline Phosphatase 09/30/2022 70  39 - 117 U/L Final   • Total Bilirubin 09/30/2022  0.6  0.0 - 1.2 mg/dL Final   • Globulin 09/30/2022 2.4  gm/dL Final   • A/G Ratio 09/30/2022 1.8  g/dL Final   • BUN/Creatinine Ratio 09/30/2022 12.5  7.0 - 25.0 Final   • Anion Gap 09/30/2022 11.5  5.0 - 15.0 mmol/L Final   • eGFR 09/30/2022 72.8  >60.0 mL/min/1.73 Final    National Kidney Foundation and American Society of Nephrology (ASN) Task Force recommended calculation based on the Chronic Kidney Disease Epidemiology Collaboration (CKD-EPI) equation refit without adjustment for race.   • Total Cholesterol 09/30/2022 205 (H)  0 - 200 mg/dL Final   • Triglycerides 09/30/2022 118  0 - 150 mg/dL Final   • HDL Cholesterol 09/30/2022 40  40 - 60 mg/dL Final   • LDL Cholesterol  09/30/2022 144 (H)  0 - 100 mg/dL Final   • VLDL Cholesterol 09/30/2022 21  5 - 40 mg/dL Final   • LDL/HDL Ratio 09/30/2022 3.54   Final   • TSH 09/30/2022 2.050  0.270 - 4.200 uIU/mL Final   • Hemoglobin A1C 09/30/2022 5.90 (H)  4.80 - 5.60 % Final   • PSA 09/30/2022 0.544  0.000 - 4.000 ng/mL Final   • Color, UA 09/30/2022 Yellow  Yellow, Straw Final   • Appearance, UA 09/30/2022 Clear  Clear Final   • pH, UA 09/30/2022 6.5  5.0 - 8.0 Final   • Specific Gravity, UA 09/30/2022 1.022  1.005 - 1.030 Final   • Glucose, UA 09/30/2022 Negative  Negative Final   • Ketones, UA 09/30/2022 Negative  Negative Final   • Bilirubin, UA 09/30/2022 Negative  Negative Final   • Blood, UA 09/30/2022 Negative  Negative Final   • Protein, UA 09/30/2022 Negative  Negative Final   • Leuk Esterase, UA 09/30/2022 Negative  Negative Final   • Nitrite, UA 09/30/2022 Negative  Negative Final   • Urobilinogen, UA 09/30/2022 0.2 E.U./dL  0.2 - 1.0 E.U./dL Final   • WBC 09/30/2022 6.94  3.40 - 10.80 10*3/mm3 Final   • RBC 09/30/2022 5.15  4.14 - 5.80 10*6/mm3 Final   • Hemoglobin 09/30/2022 15.8  13.0 - 17.7 g/dL Final   • Hematocrit 09/30/2022 46.4  37.5 - 51.0 % Final   • MCV 09/30/2022 90.1  79.0 - 97.0 fL Final   • MCH 09/30/2022 30.7  26.6 - 33.0 pg Final   • MCHC  09/30/2022 34.1  31.5 - 35.7 g/dL Final   • RDW 09/30/2022 12.9  12.3 - 15.4 % Final   • RDW-SD 09/30/2022 42.2  37.0 - 54.0 fl Final   • MPV 09/30/2022 10.9  6.0 - 12.0 fL Final   • Platelets 09/30/2022 265  140 - 450 10*3/mm3 Final   • Neutrophil % 09/30/2022 49.7  42.7 - 76.0 % Final   • Lymphocyte % 09/30/2022 37.2  19.6 - 45.3 % Final   • Monocyte % 09/30/2022 9.4  5.0 - 12.0 % Final   • Eosinophil % 09/30/2022 2.7  0.3 - 6.2 % Final   • Basophil % 09/30/2022 0.6  0.0 - 1.5 % Final   • Immature Grans % 09/30/2022 0.4  0.0 - 0.5 % Final   • Neutrophils, Absolute 09/30/2022 3.45  1.70 - 7.00 10*3/mm3 Final   • Lymphocytes, Absolute 09/30/2022 2.58  0.70 - 3.10 10*3/mm3 Final   • Monocytes, Absolute 09/30/2022 0.65  0.10 - 0.90 10*3/mm3 Final   • Eosinophils, Absolute 09/30/2022 0.19  0.00 - 0.40 10*3/mm3 Final   • Basophils, Absolute 09/30/2022 0.04  0.00 - 0.20 10*3/mm3 Final   • Immature Grans, Absolute 09/30/2022 0.03  0.00 - 0.05 10*3/mm3 Final   • nRBC 09/30/2022 0.0  0.0 - 0.2 /100 WBC Final        Procedure: None  Procedures     Assessment/Plan:   Problem List Items Addressed This Visit        Genitourinary and Reproductive     Post-void dribbling - Primary    Erectile dysfunction    Relevant Medications    tadalafil (Cialis) 5 MG tablet    tadalafil (Cialis) 20 MG tablet       Health Maintenance:   Health Maintenance Due   Topic Date Due   • COLORECTAL CANCER SCREENING  Never done   • ANNUAL PHYSICAL  02/27/2022        Smoking Counseling: Patient is a former smoker.  He has never used smokeless tobacco    Urine Incontinence: Patient reports that he is experiencing mild symptoms urinary incontinence.    Patient was given instructions and counseling regarding his condition or for health maintenance advice. Please see specific information pulled into the AVS if appropriate.    Patient Education:   Post void dribbling/BPH - Discussed the pathophysiology of BPH and obstruction.  We discussed the static and  dynamic effects of BPH as well as using 5 alpha reductase inhibitors versus alpha blockade.  We discussed the indications for transurethral surgery as well and/ or other therapeutic options available including all of the newer techniques.  I am recommending a trial of Flomax once daily to help with urinary symptoms.  I discussed the risk and side effects of this medication with the patient.  Also discussed the use of Myrbetriq for urinary frequency and urinary incontinence.  Patient states that he would like to wait on starting Myrbetriq today.  I will send in a trial of Flomax.  Erectile dysfunction -I discussed with the patient that erectile dysfunction is a symptom and not disease.  I discussed that it is often secondary to low testosterone, diabetes mellitus, significant cardiac issues, trauma, or psychological factors.  At this time I am recommending a trial of Cialis 20 mg on demand and and 5 mg once daily.  I discussed the risks and side effects of these medications.  I discussed that PDE 5 inhibitors can lower blood pressure and cause symptoms such as headache, dizziness, blurry vision, syncope, chest pain, shortness of breath, or chest palpitations.  Vies patient to discontinue medication if the symptoms occur.  Advised patient to go to the nearest ER if he has an erection lasting longer than 1 to 2 hours.  Advised patient to not take Flomax with tadalafil which can cause severe hypotension.  Informed patient to call if he has any questions or concerns.  Otherwise, I will follow-up with the patient in 1 month for reevaluation of symptoms.  Patient verbalizes understanding and agrees to plan of care.      Visit Diagnoses:    ICD-10-CM ICD-9-CM   1. Post-void dribbling  N39.43 788.35   2. Erectile dysfunction, unspecified erectile dysfunction type  N52.9 607.84       Meds Ordered During Visit:  New Medications Ordered This Visit   Medications   • tadalafil (Cialis) 5 MG tablet     Sig: Take 1 tablet by mouth  Daily As Needed for Erectile Dysfunction. Take one Daily     Dispense:  30 tablet     Refill:  0   • tadalafil (Cialis) 20 MG tablet     Sig: Take 1 tablet by mouth As Needed for Erectile Dysfunction.     Dispense:  20 tablet     Refill:  1       Follow Up Appointment: 1 month  No follow-ups on file.      This document has been electronically signed by Nic Brown PA-C   November 8, 2022 12:53 EST    Part of this note may be an electronic transcription/translation of spoken language to printed text using the Dragon Dictation System.

## 2022-11-09 ENCOUNTER — TELEPHONE (OUTPATIENT)
Dept: UROLOGY | Facility: CLINIC | Age: 52
End: 2022-11-09

## 2022-11-16 NOTE — TELEPHONE ENCOUNTER
PA WAS DENIED FOR TADALAFIL    Your PA request has been denied. Additional information will be provided in the denial communication. (Message [id number])

## 2022-12-06 ENCOUNTER — OFFICE VISIT (OUTPATIENT)
Dept: FAMILY MEDICINE CLINIC | Facility: CLINIC | Age: 52
End: 2022-12-06

## 2022-12-06 DIAGNOSIS — J98.8 VIRAL RESPIRATORY INFECTION: ICD-10-CM

## 2022-12-06 DIAGNOSIS — Z00.00 HEALTHCARE MAINTENANCE: ICD-10-CM

## 2022-12-06 DIAGNOSIS — R73.03 PREDIABETES: ICD-10-CM

## 2022-12-06 DIAGNOSIS — R68.89 FLU-LIKE SYMPTOMS: Primary | ICD-10-CM

## 2022-12-06 DIAGNOSIS — J30.2 CHRONIC SEASONAL ALLERGIC RHINITIS: ICD-10-CM

## 2022-12-06 DIAGNOSIS — B97.89 VIRAL RESPIRATORY INFECTION: ICD-10-CM

## 2022-12-06 LAB
EXPIRATION DATE: NORMAL
FLUAV AG NPH QL: NEGATIVE
FLUBV AG NPH QL: NEGATIVE
INTERNAL CONTROL: NORMAL
Lab: NORMAL

## 2022-12-06 PROCEDURE — 87804 INFLUENZA ASSAY W/OPTIC: CPT | Performed by: GENERAL PRACTICE

## 2022-12-06 PROCEDURE — 99213 OFFICE O/P EST LOW 20 MIN: CPT | Performed by: GENERAL PRACTICE

## 2022-12-06 RX ORDER — SEMAGLUTIDE 1.34 MG/ML
0.5 INJECTION, SOLUTION SUBCUTANEOUS WEEKLY
Qty: 1.5 ML | Refills: 2 | Status: SHIPPED | OUTPATIENT
Start: 2022-12-06 | End: 2023-02-07

## 2022-12-06 RX ORDER — GUAIFENESIN AND CODEINE PHOSPHATE 100; 10 MG/5ML; MG/5ML
10 SOLUTION ORAL 4 TIMES DAILY PRN
Qty: 236 ML | Refills: 0 | Status: SHIPPED | OUTPATIENT
Start: 2022-12-06 | End: 2022-12-06 | Stop reason: SDUPTHER

## 2022-12-06 RX ORDER — GUAIFENESIN AND CODEINE PHOSPHATE 100; 10 MG/5ML; MG/5ML
10 SOLUTION ORAL 4 TIMES DAILY PRN
Qty: 236 ML | Refills: 0 | Status: SHIPPED | OUTPATIENT
Start: 2022-12-06 | End: 2022-12-20

## 2022-12-06 NOTE — PROGRESS NOTES
Subjective   Fawad Morton is a 52 y.o. male.     Chief Complaint  Flulike symptoms    History of Present Illness     Flulike Symptoms  He gives a 3 to 4 day history of increased nasal congestion associated with sneezing, a sore throat, a dry cough, anterior chest pain with coughing, and mild shortness of breath.  The symptoms have also been associated with a decreased appetite, and profound fatigue.  There is no history of any other upper respiratory tract symptoms and he denies any hemoptysis.  There is no history of any nausea, vomiting, abdominal pain, or diarrhea and he denies any dysuria.  There is no history of any new headache or joint pain and he denies any rash, fever, or chills.  A home COVID test performed today was negative    The following portions of the patient's history were reviewed and updated as appropriate: allergies, current medications, past medical history, past social history and problem list.    Review of Systems   Constitutional: Positive for fatigue. Negative for appetite change, chills, fever and unexpected weight change.   HENT: Positive for congestion, postnasal drip, rhinorrhea, sneezing and sore throat. Negative for ear pain.    Eyes: Negative for visual disturbance.   Respiratory: Positive for cough and shortness of breath. Negative for wheezing.    Cardiovascular: Positive for chest pain. Negative for palpitations and leg swelling.   Gastrointestinal: Negative for abdominal pain, blood in stool, constipation, diarrhea, nausea and vomiting.   Endocrine: Negative for polydipsia and polyuria.   Genitourinary: Positive for difficulty urinating, frequency and urgency. Negative for dysuria and hematuria.        Nocturia x 2-3, sense of incomplete voiding, postvoid dribbling, intermittent erectile dysfunction   Musculoskeletal: Positive for arthralgias. Negative for back pain, joint swelling, myalgias and neck pain.   Skin: Negative for rash.   Neurological: Negative for tremors,  weakness, numbness and headaches.   Psychiatric/Behavioral: Negative for decreased concentration, dysphoric mood, sleep disturbance and suicidal ideas. The patient is not nervous/anxious.         Loss of interest in activities     Objective   Physical Exam  Constitutional:       General: He is not in acute distress.     Appearance: Normal appearance. He is well-developed. He is not ill-appearing or diaphoretic.      Comments: Looked tired but alert, in fair spirits, and in no apparent distress. No pallor, jaundice, diaphoresis, or cyanosis.   HENT:      Head: Atraumatic.      Right Ear: Tympanic membrane, ear canal and external ear normal.      Left Ear: Tympanic membrane, ear canal and external ear normal.      Mouth/Throat:      Lips: No lesions.      Mouth: Mucous membranes are moist. No oral lesions.      Pharynx: No oropharyngeal exudate or posterior oropharyngeal erythema.   Eyes:      Conjunctiva/sclera: Conjunctivae normal.   Neck:      Thyroid: No thyroid mass or thyromegaly.      Vascular: No carotid bruit or JVD.      Trachea: Trachea normal. No tracheal deviation.   Cardiovascular:      Rate and Rhythm: Normal rate and regular rhythm.      Heart sounds: Normal heart sounds, S1 normal and S2 normal. No murmur heard.    No gallop. No S3 or S4 sounds.   Pulmonary:      Effort: Pulmonary effort is normal.      Breath sounds: Normal breath sounds.   Abdominal:      General: Bowel sounds are normal. There is no distension.      Palpations: Abdomen is soft. There is no hepatomegaly, splenomegaly or mass.      Tenderness: There is no abdominal tenderness.      Hernia: No hernia is present.   Musculoskeletal:      Cervical back: No rigidity.      Right lower leg: No edema.      Left lower leg: No edema.   Lymphadenopathy:      Head:      Right side of head: No submental, submandibular, tonsillar, preauricular, posterior auricular or occipital adenopathy.      Left side of head: No submental, submandibular,  tonsillar, preauricular, posterior auricular or occipital adenopathy.      Cervical: No cervical adenopathy.      Upper Body:      Right upper body: No supraclavicular adenopathy.      Left upper body: No supraclavicular adenopathy.   Skin:     General: Skin is warm and dry.      Coloration: Skin is not cyanotic, jaundiced or pale.      Findings: No rash.      Nails: There is no clubbing.   Neurological:      Mental Status: He is alert and oriented to person, place, and time.      Cranial Nerves: No cranial nerve deficit.      Motor: No tremor.      Coordination: Coordination normal.      Gait: Gait normal.   Psychiatric:         Attention and Perception: Attention normal.         Mood and Affect: Mood normal.         Speech: Speech normal.         Behavior: Behavior normal.         Thought Content: Thought content normal.       Assessment & Plan   Problems Addressed this Visit        Allergies and Adverse Reactions    Chronic seasonal allergic rhinitis  Continue current medication       Endocrine and Metabolic    Prediabetes  Encouraged to continue to work on his diet and exercise plan.  Semaglutide will be titrated 2.5 weekly    Relevant Medications    Semaglutide,0.25 or 0.5MG/DOS, (Ozempic, 0.25 or 0.5 MG/DOSE,) 2 MG/1.5ML solution pen-injector       Health Encounters    Healthcare maintenance  Recommended an updated bivalent COVID-19 vaccination.       Pulmonary and Pneumonias    Viral respiratory infection  Advise regarding symptomatic treatment  Encouraged to report if any worse, any new symptoms, or if no better over the next 2 to 3 days    Relevant Medications    guaiFENesin-codeine (GUAIFENESIN AC) 100-10 MG/5ML liquid         Diagnoses       Codes Comments    Flu-like symptoms    -  Primary ICD-10-CM: R68.89  ICD-9-CM: 780.99     Chronic seasonal allergic rhinitis     ICD-10-CM: J30.2  ICD-9-CM: 477.8     Healthcare maintenance     ICD-10-CM: Z00.00  ICD-9-CM: V70.0     Viral respiratory infection      ICD-10-CM: J98.8, B97.89  ICD-9-CM: 079.99     Prediabetes     ICD-10-CM: R73.03  ICD-9-CM: 790.29

## 2022-12-07 VITALS
HEART RATE: 95 BPM | HEIGHT: 72 IN | RESPIRATION RATE: 14 BRPM | WEIGHT: 235 LBS | DIASTOLIC BLOOD PRESSURE: 64 MMHG | OXYGEN SATURATION: 94 % | TEMPERATURE: 98.6 F | SYSTOLIC BLOOD PRESSURE: 124 MMHG | BODY MASS INDEX: 31.83 KG/M2

## 2022-12-20 ENCOUNTER — OFFICE VISIT (OUTPATIENT)
Dept: FAMILY MEDICINE CLINIC | Facility: CLINIC | Age: 52
End: 2022-12-20

## 2022-12-20 VITALS
BODY MASS INDEX: 31.69 KG/M2 | DIASTOLIC BLOOD PRESSURE: 74 MMHG | TEMPERATURE: 97.5 F | HEART RATE: 80 BPM | SYSTOLIC BLOOD PRESSURE: 134 MMHG | HEIGHT: 72 IN | OXYGEN SATURATION: 97 % | WEIGHT: 234 LBS

## 2022-12-20 DIAGNOSIS — R10.11 RUQ ABDOMINAL PAIN: Primary | ICD-10-CM

## 2022-12-20 DIAGNOSIS — E03.8 HYPOTHYROIDISM DUE TO HASHIMOTO'S THYROIDITIS: Chronic | ICD-10-CM

## 2022-12-20 DIAGNOSIS — E78.2 MIXED HYPERLIPIDEMIA: Chronic | ICD-10-CM

## 2022-12-20 DIAGNOSIS — E06.3 HYPOTHYROIDISM DUE TO HASHIMOTO'S THYROIDITIS: Chronic | ICD-10-CM

## 2022-12-20 DIAGNOSIS — R73.03 PREDIABETES: ICD-10-CM

## 2022-12-20 DIAGNOSIS — R73.9 HYPERGLYCEMIA: ICD-10-CM

## 2022-12-20 PROCEDURE — 83690 ASSAY OF LIPASE: CPT | Performed by: PHYSICIAN ASSISTANT

## 2022-12-20 PROCEDURE — 82150 ASSAY OF AMYLASE: CPT | Performed by: PHYSICIAN ASSISTANT

## 2022-12-20 PROCEDURE — 80053 COMPREHEN METABOLIC PANEL: CPT | Performed by: PHYSICIAN ASSISTANT

## 2022-12-20 PROCEDURE — 83036 HEMOGLOBIN GLYCOSYLATED A1C: CPT | Performed by: PHYSICIAN ASSISTANT

## 2022-12-20 PROCEDURE — 99214 OFFICE O/P EST MOD 30 MIN: CPT | Performed by: PHYSICIAN ASSISTANT

## 2022-12-20 NOTE — PROGRESS NOTES
"Subjective   Fawad Morton is a 52 y.o. male.       Chief Complaint -abdominal pain    History of Present Illness -    ROS    Abdominal pain-  He complains of sudden onset of moderate to severe crampy right upper quadrant abdominal pain that began after eating on Thursday of last week (6 days ago).  Patient reports associated nausea.  He states that the pain has waxed and waned since that time with worsening pain after eating.  He reports no changes in bowel movement, diarrhea or constipation.  He denies any fever.    Prediabetes-  Stable with use of Ozempic and dietary modification    Hyperlipidemia-stable with low-cholesterol diet    Hypothyroidism-stable with Synthroid 88 mcg daily    The following portions of the patient's history were reviewed and updated as appropriate: allergies, current medications, past family history, past medical history, past social history, past surgical history and problem list.    Review of Systems    Objective  Vital signs:  /74   Pulse 80   Temp 97.5 °F (36.4 °C) (Temporal)   Ht 182.9 cm (72.01\")   Wt 106 kg (234 lb)   SpO2 97%   BMI 31.73 kg/m²     Physical Exam  Vitals and nursing note reviewed.   Constitutional:       Appearance: Normal appearance. He is well-developed.   Eyes:      Extraocular Movements: Extraocular movements intact.      Conjunctiva/sclera: Conjunctivae normal.   Cardiovascular:      Rate and Rhythm: Normal rate and regular rhythm.      Heart sounds: Normal heart sounds. No murmur heard.  Pulmonary:      Effort: Pulmonary effort is normal. No respiratory distress.      Breath sounds: Normal breath sounds. No wheezing.   Abdominal:      General: Bowel sounds are normal. There is no distension.      Palpations: Abdomen is soft.      Tenderness: There is no abdominal tenderness. There is no right CVA tenderness, left CVA tenderness, guarding or rebound.      Hernia: No hernia is present.   Musculoskeletal:         General: No tenderness.   Skin:     " General: Skin is warm and dry.      Findings: No rash.   Neurological:      Mental Status: He is alert and oriented to person, place, and time.   Psychiatric:         Mood and Affect: Mood normal.         Behavior: Behavior normal.         Thought Content: Thought content normal.         The following data was reviewed by: CRISTIAN Alejandro on 12/20/2022:  CMP    CMP 3/25/22 9/30/22   Glucose 109 (A) 102 (A)   BUN 16 15   Creatinine 1.08 1.20   Sodium 141 140   Potassium 4.7 4.5   Chloride 106 103   Calcium 9.6 9.7   Albumin 4.30 4.30   Total Bilirubin 0.6 0.6   Alkaline Phosphatase 73 70   AST (SGOT) 20 23   ALT (SGPT) 43 (A) 44 (A)   (A) Abnormal value            CBC w/diff    CBC w/Diff 3/25/22 9/30/22   WBC 6.04 6.94   RBC 5.23 5.15   Hemoglobin 15.7 15.8   Hematocrit 46.5 46.4   MCV 88.9 90.1   MCH 30.0 30.7   MCHC 33.8 34.1   RDW 12.6 12.9   Platelets 263 265   Neutrophil Rel % 55.4 49.7   Immature Granulocyte Rel % 0.2 0.4   Lymphocyte Rel % 30.8 37.2   Monocyte Rel % 9.8 9.4   Eosinophil Rel % 3.3 2.7   Basophil Rel % 0.5 0.6           Lipid Panel    Lipid Panel 3/25/22 9/30/22   Total Cholesterol 208 (A) 205 (A)   Triglycerides 148 118   HDL Cholesterol 37 (A) 40   VLDL Cholesterol 27 21   LDL Cholesterol  144 (A) 144 (A)   LDL/HDL Ratio 3.82 3.54   (A) Abnormal value            TSH    TSH 3/25/22 9/30/22   TSH 1.670 2.050           Most Recent A1C    HGBA1C Most Recent 9/30/22   Hemoglobin A1C 5.90 (A)   (A) Abnormal value                   Assessment & Plan     Diagnoses and all orders for this visit:    1. RUQ abdominal pain (Primary)  Comments:  Lab work and ultrasound gallbladder ordered for further evaluation  Advised bland low-fat diet  Discussed symptoms appropriate to constitute ER visit including persistence or worsening of abdominal pain, fever, or vomiting  Orders:  -     US Gallbladder; Future  -     CBC & Differential  -     Comprehensive Metabolic Panel  -     Amylase  -     Lipase    2.  Prediabetes  -     Hemoglobin A1c    3. Hyperglycemia  -     Hemoglobin A1c    4. Mixed hyperlipidemia  Comments:  Advised low-cholesterol diet    5. Hypothyroidism due to Hashimoto's thyroiditis  Comments:  Continue Synthroid 88 mcg daily            Patient was given instructions and counseling regarding his condition or for health maintenance advice. Please see specific information pulled into the AVS if appropriate      This document has been electronically signed by:  Humaira Ferrer PA-C

## 2022-12-21 ENCOUNTER — HOSPITAL ENCOUNTER (OUTPATIENT)
Dept: ULTRASOUND IMAGING | Facility: HOSPITAL | Age: 52
Discharge: HOME | End: 2022-12-21
Admitting: PHYSICIAN ASSISTANT

## 2022-12-21 DIAGNOSIS — R10.11 RUQ ABDOMINAL PAIN: ICD-10-CM

## 2022-12-21 LAB
ALBUMIN SERPL-MCNC: 4.3 G/DL (ref 3.5–5.2)
ALBUMIN/GLOB SERPL: 1.3 G/DL
ALP SERPL-CCNC: 94 U/L (ref 39–117)
ALT SERPL W P-5'-P-CCNC: 30 U/L (ref 1–41)
AMYLASE SERPL-CCNC: 81 U/L (ref 28–100)
ANION GAP SERPL CALCULATED.3IONS-SCNC: 9.4 MMOL/L (ref 5–15)
AST SERPL-CCNC: 20 U/L (ref 1–40)
BILIRUB SERPL-MCNC: 0.4 MG/DL (ref 0–1.2)
BUN SERPL-MCNC: <2 MG/DL (ref 6–20)
BUN/CREAT SERPL: ABNORMAL
CALCIUM SPEC-SCNC: 9.8 MG/DL (ref 8.6–10.5)
CHLORIDE SERPL-SCNC: 102 MMOL/L (ref 98–107)
CO2 SERPL-SCNC: 26.6 MMOL/L (ref 22–29)
CREAT SERPL-MCNC: 1.07 MG/DL (ref 0.76–1.27)
EGFRCR SERPLBLD CKD-EPI 2021: 83.5 ML/MIN/1.73
GLOBULIN UR ELPH-MCNC: 3.2 GM/DL
GLUCOSE SERPL-MCNC: 103 MG/DL (ref 65–99)
HBA1C MFR BLD: 5.9 % (ref 4.8–5.6)
LIPASE SERPL-CCNC: 67 U/L (ref 13–60)
POTASSIUM SERPL-SCNC: 4.3 MMOL/L (ref 3.5–5.2)
PROT SERPL-MCNC: 7.5 G/DL (ref 6–8.5)
SODIUM SERPL-SCNC: 138 MMOL/L (ref 136–145)

## 2022-12-21 PROCEDURE — 76705 ECHO EXAM OF ABDOMEN: CPT | Performed by: RADIOLOGY

## 2022-12-21 PROCEDURE — 76705 ECHO EXAM OF ABDOMEN: CPT

## 2022-12-22 ENCOUNTER — TELEPHONE (OUTPATIENT)
Dept: PSYCHIATRY | Facility: CLINIC | Age: 52
End: 2022-12-22

## 2022-12-22 DIAGNOSIS — R10.11 RUQ PAIN: Primary | ICD-10-CM

## 2022-12-22 NOTE — TELEPHONE ENCOUNTER
----- Message from CRISTIAN Alejandro sent at 12/22/2022  8:07 AM EST -----  Please inform the patient that ultrasound of the gallbladder showed only fatty infiltrate but no gallstones.  I have ordered a HIDA scan for further evaluation of gallbladder function.  We will go ahead and prioritize his HIDA scan to get this done soon.  Thank you

## 2022-12-22 NOTE — TELEPHONE ENCOUNTER
Called and lvm for patient with results. Advised for patient to call office if he had anymore questions or concerns.

## 2023-02-07 RX ORDER — SEMAGLUTIDE 1.34 MG/ML
1 INJECTION, SOLUTION SUBCUTANEOUS WEEKLY
Qty: 3 ML | Refills: 5 | Status: SHIPPED | OUTPATIENT
Start: 2023-02-07

## 2023-04-03 ENCOUNTER — OFFICE VISIT (OUTPATIENT)
Dept: UROLOGY | Facility: CLINIC | Age: 53
End: 2023-04-03
Payer: COMMERCIAL

## 2023-04-03 VITALS
DIASTOLIC BLOOD PRESSURE: 84 MMHG | HEART RATE: 78 BPM | WEIGHT: 234 LBS | HEIGHT: 72 IN | BODY MASS INDEX: 31.69 KG/M2 | SYSTOLIC BLOOD PRESSURE: 127 MMHG

## 2023-04-03 DIAGNOSIS — N40.1 BENIGN PROSTATIC HYPERPLASIA WITH URINARY FREQUENCY: Primary | ICD-10-CM

## 2023-04-03 DIAGNOSIS — R35.0 BENIGN PROSTATIC HYPERPLASIA WITH URINARY FREQUENCY: Primary | ICD-10-CM

## 2023-04-03 PROCEDURE — 99213 OFFICE O/P EST LOW 20 MIN: CPT | Performed by: UROLOGY

## 2023-04-03 NOTE — PROGRESS NOTES
Chief Complaint:      Chief Complaint   Patient presents with   • Post-void dribbling     Follow up       HPI:   53 y.o. male.  Returns today.  PSA is 0.544.  His symptoms are not really that bad he feels the Cialis made him sick for 3 days the alpha blockade made him retrograde ejaculation other than that the neck step would be cystoscopy I discussed this with him he wants to hold off we will follow-up with him in a year.  Past Medical History:     Past Medical History:   Diagnosis Date   • Allergic sinusitis    • GERD (gastroesophageal reflux disease)    • Hyperlipidemia    • Hypogonadism, male    • Hypothyroidism    • Mechanical low back pain    • Migraine    • Non-alcoholic fatty liver disease    • Prediabetes    • Situational anxiety    • Skin tag        Current Meds:     Current Outpatient Medications   Medication Sig Dispense Refill   • ipratropium (ATROVENT) 0.06 % nasal spray 2 sprays into the nostril(s) as directed by provider 4 (Four) Times a Day. 15 mL 5   • Pseudoephedrine HCl  MG tablet sustained-release 24 hour Take 1 tablet by mouth Daily As Needed (nasal congestion). 30 each 5   • Semaglutide, 1 MG/DOSE, (Ozempic, 1 MG/DOSE,) 4 MG/3ML solution pen-injector Inject 1 mg under the skin into the appropriate area as directed 1 (One) Time Per Week. 3 mL 5   • Synthroid 88 MCG tablet Take 1 tablet by mouth Daily. 90 tablet 3   • valACYclovir (Valtrex) 1000 MG tablet 2 tablets every 12 hours for one day at the first sign of a cold sore 12 tablet 3   • tadalafil (Cialis) 20 MG tablet Take 1 tablet by mouth As Needed for Erectile Dysfunction. (Patient not taking: Reported on 4/3/2023) 20 tablet 1   • tadalafil (Cialis) 5 MG tablet Take 1 tablet by mouth Daily As Needed for Erectile Dysfunction. Take one Daily (Patient not taking: Reported on 4/3/2023) 30 tablet 0     No current facility-administered medications for this visit.        Allergies:      Allergies   Allergen Reactions   • Penicillins          Past Surgical History:     History reviewed. No pertinent surgical history.    Social History:     Social History     Socioeconomic History   • Marital status:      Spouse name: andriy   • Number of children: 4   • Years of education: 18   Tobacco Use   • Smoking status: Former   • Smokeless tobacco: Never   Vaping Use   • Vaping Use: Never used   Substance and Sexual Activity   • Alcohol use: Yes     Alcohol/week: 1.0 standard drink     Types: 1 Cans of beer per week   • Drug use: No   • Sexual activity: Defer       Family History:     Family History   Problem Relation Age of Onset   • No Known Problems Mother        Review of Systems:     Review of Systems   Constitutional: Negative.    HENT: Negative.    Eyes: Negative.    Respiratory: Negative.    Cardiovascular: Negative.    Gastrointestinal: Negative.    Endocrine: Negative.    Musculoskeletal: Negative.    Allergic/Immunologic: Negative.    Neurological: Negative.    Hematological: Negative.    Psychiatric/Behavioral: Negative.        Physical Exam:     Physical Exam  Vitals and nursing note reviewed.   Constitutional:       Appearance: He is well-developed.   HENT:      Head: Normocephalic and atraumatic.   Eyes:      Conjunctiva/sclera: Conjunctivae normal.      Pupils: Pupils are equal, round, and reactive to light.   Cardiovascular:      Rate and Rhythm: Normal rate and regular rhythm.      Heart sounds: Normal heart sounds.   Pulmonary:      Effort: Pulmonary effort is normal.      Breath sounds: Normal breath sounds.   Abdominal:      General: Bowel sounds are normal.      Palpations: Abdomen is soft.   Musculoskeletal:         General: Normal range of motion.      Cervical back: Normal range of motion.   Skin:     General: Skin is warm and dry.   Neurological:      Mental Status: He is alert and oriented to person, place, and time.      Deep Tendon Reflexes: Reflexes are normal and symmetric.   Psychiatric:         Behavior: Behavior normal.          Thought Content: Thought content normal.         Judgment: Judgment normal.         I have reviewed the following portions of the patient's history: Allergies, current medications, past family history, past medical history, past social history, past surgical history, problem list, and ROS and confirm it is accurate.    Recent Image (CT and/or KUB):      CT Abdomen and Pelvis: No results found for this or any previous visit.       CT Stone Protocol: No results found for this or any previous visit.       KUB: No results found for this or any previous visit.       Labs (past 3 months):      No visits with results within 3 Month(s) from this visit.   Latest known visit with results is:   Office Visit on 12/20/2022   Component Date Value Ref Range Status   • Glucose 12/20/2022 103 (H)  65 - 99 mg/dL Final   • BUN 12/20/2022 <2 (L)  6 - 20 mg/dL Final   • Creatinine 12/20/2022 1.07  0.76 - 1.27 mg/dL Final   • Sodium 12/20/2022 138  136 - 145 mmol/L Final   • Potassium 12/20/2022 4.3  3.5 - 5.2 mmol/L Final   • Chloride 12/20/2022 102  98 - 107 mmol/L Final   • CO2 12/20/2022 26.6  22.0 - 29.0 mmol/L Final   • Calcium 12/20/2022 9.8  8.6 - 10.5 mg/dL Final   • Total Protein 12/20/2022 7.5  6.0 - 8.5 g/dL Final   • Albumin 12/20/2022 4.30  3.50 - 5.20 g/dL Final   • ALT (SGPT) 12/20/2022 30  1 - 41 U/L Final   • AST (SGOT) 12/20/2022 20  1 - 40 U/L Final   • Alkaline Phosphatase 12/20/2022 94  39 - 117 U/L Final   • Total Bilirubin 12/20/2022 0.4  0.0 - 1.2 mg/dL Final   • Globulin 12/20/2022 3.2  gm/dL Final   • A/G Ratio 12/20/2022 1.3  g/dL Final   • BUN/Creatinine Ratio 12/20/2022    Final    Unable to calculate Bun/Crea Ratio.   • Anion Gap 12/20/2022 9.4  5.0 - 15.0 mmol/L Final   • eGFR 12/20/2022 83.5  >60.0 mL/min/1.73 Final    National Kidney Foundation and American Society of Nephrology (ASN) Task Force recommended calculation based on the Chronic Kidney Disease Epidemiology Collaboration (CKD-EPI)  equation refit without adjustment for race.   • Amylase 12/20/2022 81  28 - 100 U/L Final   • Lipase 12/20/2022 67 (H)  13 - 60 U/L Final   • Hemoglobin A1C 12/20/2022 5.90 (H)  4.80 - 5.60 % Final        Procedure:       Assessment/Plan:   BPH: Discussed the pathophysiology of BPH and obstruction.  We discussed the static and dynamic effects of BPH as well as using 5 alpha reductase inhibitors versus alpha blockade.  We discussed the indications for transurethral surgery as well and/ or other therapeutic options available including all of the newer techniques.  He cannot take Cialis or alpha blockade we offered him cystoscopy but his symptoms are not that bad as yet  PSA testing-I am recommending a PSA blood test that stands for prostate specific antigen.  I discussed the pathophysiology of PSA testing indicating its use in the diagnosis and management of prostate cancer.  I discussed the normal range being 0 to 4, but more appropriately being much closer to 0 to 2 in a normal male.  I discussed the fact that after a certain age we don't recommend PSA testing especially in view of numerous comorbidities, that this will not be a useful test.  I discussed many of the things that can artificially raise PSA including a recent infection, urinary tract infection, and recent sexual intercourse, or even the type of movement such as manipulation of the prostate from riding a bicycle.  After all this is taken into account when the test is reviewed, the most important use of PSA is the velocity measurement.  In other words, the change of PSA with time is a very important factor in the use and that we look for greater than 20% rise over a year to help us make the prediction of prostate cancer.  I also discussed that the use with prostate cancer indicating that after a radical prostatectomy, the PSA should be 0 and any rise indicates an early biochemical recurrence.          This document has been electronically signed by  GUADALUPE VÁSQUEZ MD April 3, 2023 13:10 EDT    Dictated Utilizing Dragon Dictation: Part of this note may be an electronic transcription/translation of spoken language to printed text using the Dragon Dictation System.

## 2023-04-11 DIAGNOSIS — E03.8 HYPOTHYROIDISM DUE TO HASHIMOTO'S THYROIDITIS: ICD-10-CM

## 2023-04-11 DIAGNOSIS — E06.3 HYPOTHYROIDISM DUE TO HASHIMOTO'S THYROIDITIS: ICD-10-CM

## 2023-04-11 DIAGNOSIS — E78.2 MIXED HYPERLIPIDEMIA: Primary | ICD-10-CM

## 2023-04-11 DIAGNOSIS — R73.03 PREDIABETES: ICD-10-CM

## 2023-04-11 DIAGNOSIS — K76.0 NON-ALCOHOLIC FATTY LIVER DISEASE: ICD-10-CM

## 2023-04-12 ENCOUNTER — LAB (OUTPATIENT)
Dept: FAMILY MEDICINE CLINIC | Facility: CLINIC | Age: 53
End: 2023-04-12
Payer: COMMERCIAL

## 2023-04-12 DIAGNOSIS — E03.8 HYPOTHYROIDISM DUE TO HASHIMOTO'S THYROIDITIS: ICD-10-CM

## 2023-04-12 DIAGNOSIS — E78.2 MIXED HYPERLIPIDEMIA: ICD-10-CM

## 2023-04-12 DIAGNOSIS — E06.3 HYPOTHYROIDISM DUE TO HASHIMOTO'S THYROIDITIS: ICD-10-CM

## 2023-04-12 DIAGNOSIS — K76.0 NON-ALCOHOLIC FATTY LIVER DISEASE: ICD-10-CM

## 2023-04-12 DIAGNOSIS — R73.03 PREDIABETES: ICD-10-CM

## 2023-04-12 LAB
ALBUMIN SERPL-MCNC: 4.2 G/DL (ref 3.5–5.2)
ALBUMIN/GLOB SERPL: 1.5 G/DL
ALP SERPL-CCNC: 71 U/L (ref 39–117)
ALT SERPL W P-5'-P-CCNC: 26 U/L (ref 1–41)
ANION GAP SERPL CALCULATED.3IONS-SCNC: 8 MMOL/L (ref 5–15)
AST SERPL-CCNC: 18 U/L (ref 1–40)
BASOPHILS # BLD AUTO: 0.05 10*3/MM3 (ref 0–0.2)
BASOPHILS NFR BLD AUTO: 0.7 % (ref 0–1.5)
BILIRUB SERPL-MCNC: 0.3 MG/DL (ref 0–1.2)
BUN SERPL-MCNC: 14 MG/DL (ref 6–20)
BUN/CREAT SERPL: 12.7 (ref 7–25)
CALCIUM SPEC-SCNC: 9.2 MG/DL (ref 8.6–10.5)
CHLORIDE SERPL-SCNC: 104 MMOL/L (ref 98–107)
CHOLEST SERPL-MCNC: 177 MG/DL (ref 0–200)
CO2 SERPL-SCNC: 25 MMOL/L (ref 22–29)
CREAT SERPL-MCNC: 1.1 MG/DL (ref 0.76–1.27)
DEPRECATED RDW RBC AUTO: 39.7 FL (ref 37–54)
EGFRCR SERPLBLD CKD-EPI 2021: 80.3 ML/MIN/1.73
EOSINOPHIL # BLD AUTO: 0.18 10*3/MM3 (ref 0–0.4)
EOSINOPHIL NFR BLD AUTO: 2.3 % (ref 0.3–6.2)
ERYTHROCYTE [DISTWIDTH] IN BLOOD BY AUTOMATED COUNT: 12.4 % (ref 12.3–15.4)
GLOBULIN UR ELPH-MCNC: 2.8 GM/DL
GLUCOSE SERPL-MCNC: 93 MG/DL (ref 65–99)
HBA1C MFR BLD: 6 % (ref 4.8–5.6)
HCT VFR BLD AUTO: 45.4 % (ref 37.5–51)
HDLC SERPL-MCNC: 37 MG/DL (ref 40–60)
HGB BLD-MCNC: 15.3 G/DL (ref 13–17.7)
IMM GRANULOCYTES # BLD AUTO: 0.02 10*3/MM3 (ref 0–0.05)
IMM GRANULOCYTES NFR BLD AUTO: 0.3 % (ref 0–0.5)
LDLC SERPL CALC-MCNC: 116 MG/DL (ref 0–100)
LDLC/HDLC SERPL: 3.08 {RATIO}
LYMPHOCYTES # BLD AUTO: 2.34 10*3/MM3 (ref 0.7–3.1)
LYMPHOCYTES NFR BLD AUTO: 30.5 % (ref 19.6–45.3)
MCH RBC QN AUTO: 30.1 PG (ref 26.6–33)
MCHC RBC AUTO-ENTMCNC: 33.7 G/DL (ref 31.5–35.7)
MCV RBC AUTO: 89.4 FL (ref 79–97)
MONOCYTES # BLD AUTO: 0.71 10*3/MM3 (ref 0.1–0.9)
MONOCYTES NFR BLD AUTO: 9.3 % (ref 5–12)
NEUTROPHILS NFR BLD AUTO: 4.36 10*3/MM3 (ref 1.7–7)
NEUTROPHILS NFR BLD AUTO: 56.9 % (ref 42.7–76)
NRBC BLD AUTO-RTO: 0 /100 WBC (ref 0–0.2)
PLATELET # BLD AUTO: 274 10*3/MM3 (ref 140–450)
PMV BLD AUTO: 10.8 FL (ref 6–12)
POTASSIUM SERPL-SCNC: 4.4 MMOL/L (ref 3.5–5.2)
PROT SERPL-MCNC: 7 G/DL (ref 6–8.5)
RBC # BLD AUTO: 5.08 10*6/MM3 (ref 4.14–5.8)
SODIUM SERPL-SCNC: 137 MMOL/L (ref 136–145)
TRIGL SERPL-MCNC: 131 MG/DL (ref 0–150)
TSH SERPL DL<=0.05 MIU/L-ACNC: 1.08 UIU/ML (ref 0.27–4.2)
VLDLC SERPL-MCNC: 24 MG/DL (ref 5–40)
WBC NRBC COR # BLD: 7.66 10*3/MM3 (ref 3.4–10.8)

## 2023-04-12 PROCEDURE — 80050 GENERAL HEALTH PANEL: CPT | Performed by: GENERAL PRACTICE

## 2023-04-12 PROCEDURE — 80061 LIPID PANEL: CPT | Performed by: GENERAL PRACTICE

## 2023-04-12 PROCEDURE — 83036 HEMOGLOBIN GLYCOSYLATED A1C: CPT | Performed by: GENERAL PRACTICE

## 2023-04-14 ENCOUNTER — OFFICE VISIT (OUTPATIENT)
Dept: FAMILY MEDICINE CLINIC | Facility: CLINIC | Age: 53
End: 2023-04-14
Payer: COMMERCIAL

## 2023-04-14 DIAGNOSIS — N39.43 POST-VOID DRIBBLING: ICD-10-CM

## 2023-04-14 DIAGNOSIS — J30.2 CHRONIC SEASONAL ALLERGIC RHINITIS: Primary | ICD-10-CM

## 2023-04-14 DIAGNOSIS — E78.2 MIXED HYPERLIPIDEMIA: ICD-10-CM

## 2023-04-14 DIAGNOSIS — M54.59 MECHANICAL LOW BACK PAIN: ICD-10-CM

## 2023-04-14 DIAGNOSIS — M25.571 CHRONIC PAIN OF RIGHT ANKLE: ICD-10-CM

## 2023-04-14 DIAGNOSIS — E03.8 HYPOTHYROIDISM DUE TO HASHIMOTO'S THYROIDITIS: ICD-10-CM

## 2023-04-14 DIAGNOSIS — G43.009 MIGRAINE WITHOUT AURA AND WITHOUT STATUS MIGRAINOSUS, NOT INTRACTABLE: ICD-10-CM

## 2023-04-14 DIAGNOSIS — Z00.00 HEALTHCARE MAINTENANCE: ICD-10-CM

## 2023-04-14 DIAGNOSIS — R73.03 PREDIABETES: ICD-10-CM

## 2023-04-14 DIAGNOSIS — K21.9 GASTROESOPHAGEAL REFLUX DISEASE WITHOUT ESOPHAGITIS: ICD-10-CM

## 2023-04-14 DIAGNOSIS — K76.0 NON-ALCOHOLIC FATTY LIVER DISEASE: ICD-10-CM

## 2023-04-14 DIAGNOSIS — E06.3 HYPOTHYROIDISM DUE TO HASHIMOTO'S THYROIDITIS: ICD-10-CM

## 2023-04-14 DIAGNOSIS — G89.29 CHRONIC PAIN OF RIGHT ANKLE: ICD-10-CM

## 2023-04-14 DIAGNOSIS — N52.9 ERECTILE DYSFUNCTION, UNSPECIFIED ERECTILE DYSFUNCTION TYPE: ICD-10-CM

## 2023-04-14 DIAGNOSIS — F32.A DEPRESSION, UNSPECIFIED DEPRESSION TYPE: ICD-10-CM

## 2023-04-14 DIAGNOSIS — E66.9 CLASS 1 OBESITY WITH SERIOUS COMORBIDITY AND BODY MASS INDEX (BMI) OF 32.0 TO 32.9 IN ADULT, UNSPECIFIED OBESITY TYPE: ICD-10-CM

## 2023-04-14 PROBLEM — J98.8 VIRAL RESPIRATORY INFECTION: Status: RESOLVED | Noted: 2022-12-06 | Resolved: 2023-04-14

## 2023-04-14 PROBLEM — B97.89 VIRAL RESPIRATORY INFECTION: Status: RESOLVED | Noted: 2022-12-06 | Resolved: 2023-04-14

## 2023-04-14 RX ORDER — GLUCOSAMINE HCL/CHONDROITIN SU 500-400 MG
1 CAPSULE ORAL DAILY PRN
Qty: 30 EACH | Refills: 5 | Status: SHIPPED | OUTPATIENT
Start: 2023-04-14

## 2023-04-14 RX ORDER — BLOOD-GLUCOSE METER
1 KIT MISCELLANEOUS ONCE
Qty: 1 EACH | Refills: 0 | Status: SHIPPED | OUTPATIENT
Start: 2023-04-14 | End: 2023-04-14

## 2023-04-14 RX ORDER — LANCETS 30 GAUGE
1 EACH MISCELLANEOUS DAILY PRN
Qty: 30 EACH | Refills: 5 | Status: SHIPPED | OUTPATIENT
Start: 2023-04-14

## 2023-04-14 NOTE — PROGRESS NOTES
Subjective   Fawad Morton is a 53 y.o. male.     Chief Complaint  He returns for a scheduled reassessment of multiple medical problems including chronic shortness of breath, chronic allergic rhinitis, hypothyroidism, hyperlipidemia, prediabetes, and nocturia    History of Present Illness     Shortness of Breath With Exertion  He remains short of breath with above average exertion.  Episodes occur primarily with sudden spurts of significant exertion, however, he has noted some decrease in his overall exercise tolerance with time.  He continues to deny any chest pain, orthopnea, PND, cough, calf pain or swelling of the ankles. CT coronary angiogram done on 3/23/15 was unremarkable as was a stress test performed on 2/11/15  Lab Results   Component Value Date    WBC 7.66 04/12/2023    HGB 15.3 04/12/2023    HCT 45.4 04/12/2023    MCV 89.4 04/12/2023     04/12/2023     Chronic Allergic Rhinitis  He has a long history of intermittent sneezing, clear rhinorrhea, nasal congestion and postnasal drip. These symptoms are perennial with seasonal exacerbation. He has tried a number of antihistamines and nasal corticosteroids with a limited relief of symptoms.  He remains on OTC pseudoephedrine with some benefit and no apparent side effects    Hypothyroidism  He has a history of hypothyroidism associated with Hashimoto's disease. He remains on levothyroxine 88 qd.  Lab Results   Component Value Date    TSH 1.080 04/12/2023     Hyperlipidemia  His compliance with treatment has been fair.  Outside of work he gets little exercise. He is currently being prescribed the following medication for his dyslipidemia - lifestyle modifcation.   Lab Results   Component Value Date    CHOL 177 04/12/2023    CHLPL 195 01/27/2016    TRIG 131 04/12/2023    HDL 37 (L) 04/12/2023     (H) 04/12/2023      Prediabetes  He is currently on semaglutide 1 milligram weekly with no apparent side effects.  He felt metformin affected his mood in  a negative way.  Lab Results   Component Value Date    HGBA1C 6.00 (H) 04/12/2023     Nocturia  He gives a long history of nocturia.  He has experienced increasing daytime frequency associated with occasional urgency, a sense of incomplete voiding, and postvoid dribbling.  Over the last year or two he has had intermittent difficulty obtaining and maintaining an erection.  He denies any hesitancy or decrease in his stream and there is no history of dysuria or hematuria.  He denies any change in his voice or beard growth and there is no history of any lower extremity weakness, numbness, or tingling.  He underwent a urology assessment since last here with a trial of daily tadalafil with little impact on his symptoms.    GERD  He has a long history of of heartburn.  He continues to deny any abdominal bloating, dysphagia, dyspepsia, hematemesis, hoarseness, melena, nausea or unexpected weight loss. Symptoms appear to be worsened by large meals and lying down. Risk factors present for GERD include caffeine use. Risk factors absent for GERD are tobacco abuse, alcohol use and NSAID use. Studies performed so far include upper endoscopy, result: positive for reflux esophagitis, upper GI, result: negative, esophageal manometry and ph/imedence: positive for reflux . Treatments tried so far include proton pump inhibitor: omeprazole. Results of treatment: no heartburn however he continues to have a sense of fullness at the back of his throat.     Right Knee Pain  He continues to have occasional knee pain as previously described.  He denies any change in the quality nor any new associated symptoms..  The pain is associated with mild stiffness but he denies any swelling, giving way, or locking.     Labs  Lab Results   Component Value Date    GLUCOSE 93 04/12/2023    BUN 14 04/12/2023    CREATININE 1.10 04/12/2023    EGFR 80.3 04/12/2023    BCR 12.7 04/12/2023    K 4.4 04/12/2023    CO2 25.0 04/12/2023    CALCIUM 9.2 04/12/2023     ALBUMIN 4.2 04/12/2023    BILITOT 0.3 04/12/2023    AST 18 04/12/2023    ALT 26 04/12/2023     Lab Results   Component Value Date    ALKPHOS 71 04/12/2023     The following portions of the patient's history were reviewed and updated as appropriate: allergies, current medications, past medical history, past social history and problem list.    Review of Systems   Constitutional: Positive for fatigue. Negative for appetite change, chills, fever and unexpected weight change.   HENT: Positive for congestion, postnasal drip, rhinorrhea, sneezing and sore throat. Negative for ear pain.    Eyes: Negative for visual disturbance.   Respiratory: Positive for cough and shortness of breath. Negative for wheezing.    Cardiovascular: Negative for chest pain, palpitations and leg swelling.   Gastrointestinal: Negative for abdominal pain, blood in stool, constipation, diarrhea, nausea and vomiting.   Endocrine: Negative for polydipsia and polyuria.   Genitourinary: Positive for difficulty urinating, frequency and urgency. Negative for dysuria and hematuria.        Nocturia x 2-3, sense of incomplete voiding, postvoid dribbling, intermittent erectile dysfunction   Musculoskeletal: Positive for arthralgias. Negative for back pain, joint swelling, myalgias and neck pain.   Skin: Negative for rash.   Neurological: Negative for tremors, weakness, numbness and headaches.   Psychiatric/Behavioral: Negative for decreased concentration, dysphoric mood, sleep disturbance and suicidal ideas. The patient is not nervous/anxious.      Objective   Physical Exam  Constitutional:       General: He is not in acute distress.     Appearance: Normal appearance. He is well-developed. He is not ill-appearing or diaphoretic.      Comments: Alert tendon fair spirits. No apparent distress. No pallor, jaundice, diaphoresis, or cyanosis.   HENT:      Head: Atraumatic.      Right Ear: Tympanic membrane, ear canal and external ear normal.      Left Ear: Tympanic  membrane, ear canal and external ear normal.      Mouth/Throat:      Lips: No lesions.      Mouth: Mucous membranes are moist. No oral lesions.      Pharynx: No oropharyngeal exudate or posterior oropharyngeal erythema.   Eyes:      General: Lids are normal. Gaze aligned appropriately.      Extraocular Movements: Extraocular movements intact.      Conjunctiva/sclera: Conjunctivae normal.      Pupils: Pupils are equal.   Neck:      Thyroid: No thyroid mass or thyromegaly.      Vascular: No carotid bruit or JVD.      Trachea: Trachea normal. No tracheal deviation.   Cardiovascular:      Rate and Rhythm: Normal rate and regular rhythm.      Heart sounds: Normal heart sounds, S1 normal and S2 normal. No murmur heard.    No gallop. No S3 or S4 sounds.   Pulmonary:      Effort: Pulmonary effort is normal.      Breath sounds: Normal breath sounds.   Abdominal:      General: Bowel sounds are normal. There is no distension.   Musculoskeletal:      Cervical back: No rigidity.      Right lower leg: No edema.      Left lower leg: No edema.   Lymphadenopathy:      Head:      Right side of head: No submental, submandibular, tonsillar, preauricular, posterior auricular or occipital adenopathy.      Left side of head: No submental, submandibular, tonsillar, preauricular, posterior auricular or occipital adenopathy.      Cervical: No cervical adenopathy.      Upper Body:      Right upper body: No supraclavicular adenopathy.      Left upper body: No supraclavicular adenopathy.   Skin:     General: Skin is warm and dry.      Coloration: Skin is not cyanotic, jaundiced or pale.      Findings: No rash.      Nails: There is no clubbing.   Neurological:      Mental Status: He is alert and oriented to person, place, and time.      Cranial Nerves: No cranial nerve deficit, dysarthria or facial asymmetry.      Sensory: No sensory deficit.      Motor: No tremor.      Coordination: Coordination normal.      Gait: Gait normal.   Psychiatric:          Attention and Perception: Attention normal.         Mood and Affect: Mood normal.         Speech: Speech normal.         Behavior: Behavior normal.         Thought Content: Thought content normal.       Assessment & Plan   Problems Addressed this Visit        Allergies and Adverse Reactions    Chronic seasonal allergic rhinitis   Continue current medication  Encouraged to report if any worse or if any new symptoms or concerns.       Cardiac and Vasculature    Mixed hyperlipidemia  Encouraged to continue to work on his diet and exercise plan.       Endocrine and Metabolic    Class 1 obesity with serious comorbidity and body mass index (BMI) of 32.0 to 32.9 in adult  As above.    Hypothyroidism due to Hashimoto's thyroiditis  Clinically and bio-chemically euthyroid.  Continue current medication.    Prediabetes  As above.   Continue current medication.  Prescription written for a glucometer  Fingerstick A1c will be done at his return    Relevant Medications    Lancets misc    Glucose Blood (Blood Glucose Test) strip       Gastrointestinal Abdominal     Gastroesophageal reflux disease without esophagitis   Symptoms are currently well controlled.  Encouraged to report if this should change.    Non-alcoholic fatty liver disease  Will continue to monitor       Genitourinary and Reproductive     Erectile dysfunction  Follow up with urology    Post-void dribbling  As above.       Health Encounters    Healthcare maintenance  Reminded that he is due for a screening colonoscopy.  He will arrange and will let us know if he has any difficulty doing so  We will discuss Shingrix again at his return       Mental Health    Depression       Musculoskeletal and Injuries    Chronic pain of right ankle    Mechanical low back pain       Neuro    Migraine without aura and without status migrainosus, not intractable   Diagnoses       Codes Comments    Chronic seasonal allergic rhinitis    -  Primary ICD-10-CM: J30.2  ICD-9-CM: 477.8      Mixed hyperlipidemia     ICD-10-CM: E78.2  ICD-9-CM: 272.2     Prediabetes     ICD-10-CM: R73.03  ICD-9-CM: 790.29     Hypothyroidism due to Hashimoto's thyroiditis     ICD-10-CM: E03.8, E06.3  ICD-9-CM: 244.8, 245.2     Class 1 obesity with serious comorbidity and body mass index (BMI) of 32.0 to 32.9 in adult, unspecified obesity type     ICD-10-CM: E66.9, Z68.32  ICD-9-CM: 278.00, V85.32     Non-alcoholic fatty liver disease     ICD-10-CM: K76.0  ICD-9-CM: 571.8     Gastroesophageal reflux disease without esophagitis     ICD-10-CM: K21.9  ICD-9-CM: 530.81     Post-void dribbling     ICD-10-CM: N39.43  ICD-9-CM: 788.35     Erectile dysfunction, unspecified erectile dysfunction type     ICD-10-CM: N52.9  ICD-9-CM: 607.84     Healthcare maintenance     ICD-10-CM: Z00.00  ICD-9-CM: V70.0     Depression, unspecified depression type     ICD-10-CM: F32.A  ICD-9-CM: 311     Mechanical low back pain     ICD-10-CM: M54.59  ICD-9-CM: 724.2     Chronic pain of right ankle     ICD-10-CM: M25.571, G89.29  ICD-9-CM: 719.47, 338.29     Migraine without aura and without status migrainosus, not intractable     ICD-10-CM: G43.009  ICD-9-CM: 346.10

## 2023-04-15 VITALS
DIASTOLIC BLOOD PRESSURE: 62 MMHG | SYSTOLIC BLOOD PRESSURE: 122 MMHG | WEIGHT: 232 LBS | HEART RATE: 94 BPM | TEMPERATURE: 98.6 F | OXYGEN SATURATION: 95 % | HEIGHT: 72 IN | BODY MASS INDEX: 31.42 KG/M2 | RESPIRATION RATE: 14 BRPM

## 2023-04-26 PROBLEM — R35.0 BENIGN PROSTATIC HYPERPLASIA WITH URINARY FREQUENCY: Status: ACTIVE | Noted: 2023-04-26

## 2023-04-26 PROBLEM — N40.1 BENIGN PROSTATIC HYPERPLASIA WITH URINARY FREQUENCY: Status: ACTIVE | Noted: 2023-04-26

## 2023-08-08 ENCOUNTER — OFFICE VISIT (OUTPATIENT)
Dept: FAMILY MEDICINE CLINIC | Facility: CLINIC | Age: 53
End: 2023-08-08
Payer: COMMERCIAL

## 2023-08-08 DIAGNOSIS — R35.0 BENIGN PROSTATIC HYPERPLASIA WITH URINARY FREQUENCY: ICD-10-CM

## 2023-08-08 DIAGNOSIS — N40.1 BENIGN PROSTATIC HYPERPLASIA WITH URINARY FREQUENCY: ICD-10-CM

## 2023-08-08 DIAGNOSIS — E03.8 HYPOTHYROIDISM DUE TO HASHIMOTO'S THYROIDITIS: ICD-10-CM

## 2023-08-08 DIAGNOSIS — F32.A DEPRESSION, UNSPECIFIED DEPRESSION TYPE: ICD-10-CM

## 2023-08-08 DIAGNOSIS — Z00.00 HEALTHCARE MAINTENANCE: ICD-10-CM

## 2023-08-08 DIAGNOSIS — J30.2 CHRONIC SEASONAL ALLERGIC RHINITIS: Primary | ICD-10-CM

## 2023-08-08 DIAGNOSIS — G43.009 MIGRAINE WITHOUT AURA AND WITHOUT STATUS MIGRAINOSUS, NOT INTRACTABLE: ICD-10-CM

## 2023-08-08 DIAGNOSIS — N52.9 ERECTILE DYSFUNCTION, UNSPECIFIED ERECTILE DYSFUNCTION TYPE: ICD-10-CM

## 2023-08-08 DIAGNOSIS — E06.3 HYPOTHYROIDISM DUE TO HASHIMOTO'S THYROIDITIS: ICD-10-CM

## 2023-08-08 DIAGNOSIS — E78.2 MIXED HYPERLIPIDEMIA: ICD-10-CM

## 2023-08-08 DIAGNOSIS — K76.0 NON-ALCOHOLIC FATTY LIVER DISEASE: ICD-10-CM

## 2023-08-08 DIAGNOSIS — E66.9 CLASS 1 OBESITY WITH SERIOUS COMORBIDITY AND BODY MASS INDEX (BMI) OF 31.0 TO 31.9 IN ADULT, UNSPECIFIED OBESITY TYPE: ICD-10-CM

## 2023-08-08 DIAGNOSIS — R73.03 PREDIABETES: ICD-10-CM

## 2023-08-08 DIAGNOSIS — R06.02 SHORTNESS OF BREATH ON EXERTION: ICD-10-CM

## 2023-08-08 DIAGNOSIS — K21.9 GASTROESOPHAGEAL REFLUX DISEASE WITHOUT ESOPHAGITIS: ICD-10-CM

## 2023-08-08 DIAGNOSIS — R51.9 BILATERAL HEADACHES: ICD-10-CM

## 2023-08-08 LAB
EXPIRATION DATE: NORMAL
HBA1C MFR BLD: 5.3 %
Lab: NORMAL

## 2023-08-08 NOTE — PROGRESS NOTES
Subjective   Fawad Morton is a 53 y.o. male.     Chief Complaint  Headaches  History of Present Illness     Headaches  He returns with an approximate 6 week history of intermittent headaches.  He states that the first occurred 3 to 4 hours after he experienced a left frontal popping sensation.  His headaches have been occurring daily and occasionally wake him at night.  The pain is generalized but varies in quality from a dull ache to a sharp pressure.  With the exception of occasional sores about the left buccal mucosa, he denies any other changes.  He denies any warning or associated symptoms.  He denies any changes in his vision, strength, sensation, speech, or coordination and there is no history of any new joint muscle pain.  He denies any change in his upper respiratory tract symptoms and there is no history of any nausea, vomiting, rash, fever, chills, or night sweats.  He works as a  and gets frequent tick bites.  He denies any travel outside the area.  He has not been around anyone with such symptoms.  Noncontrast CT of the head performed on 7/12/2023 and a CTA performed on 7/19/2023 were unremarkable.    Shortness of Breath With Exertion  He remains short of breath with above average exertion.  Episodes occur primarily with sudden spurts of significant exertion, however, he has noted some decrease in his overall exercise tolerance with time.  He continues to deny any chest pain, orthopnea, PND, cough, calf pain or swelling of the ankles. CT coronary angiogram done on 3/23/15 was unremarkable as was a stress test performed on 2/11/15    Chronic Allergic Rhinitis  He has a long history of intermittent sneezing, clear rhinorrhea, nasal congestion and postnasal drip. These symptoms are perennial with seasonal exacerbation. He has tried a number of antihistamines and nasal corticosteroids with a limited relief of symptoms.  He remains on OTC pseudoephedrine with some benefit and no apparent side  effects    Hypothyroidism  He has a history of hypothyroidism associated with Hashimoto's disease. He remains on levothyroxine 88 qd.    Hyperlipidemia  His compliance with treatment has been fair.  Outside of work he gets little exercise. He is currently being prescribed the following medication for his dyslipidemia - lifestyle modifcation.      Prediabetes  He is currently on semaglutide 1 milligram weekly with no apparent side effects.  He felt metformin affected his mood in a negative way.    Nocturia  He gives a long history of nocturia.  He has experienced increasing daytime frequency associated with occasional urgency, a sense of incomplete voiding, and postvoid dribbling.  Over the last year or two he has had intermittent difficulty obtaining and maintaining an erection.  He denies any hesitancy or decrease in his stream and there is no history of dysuria or hematuria.  He denies any change in his voice or beard growth and there is no history of any lower extremity weakness, numbness, or tingling.  He underwent a recent urology assessment  with a trial of daily tadalafil with little impact on his symptoms.    GERD  He has a long history of of heartburn.  He continues to deny any abdominal bloating, dysphagia, dyspepsia, hematemesis, hoarseness, melena, nausea or unexpected weight loss. Symptoms appear to be worsened by large meals and lying down. Risk factors present for GERD include caffeine use. Risk factors absent for GERD are tobacco abuse, alcohol use and NSAID use. Studies performed so far include upper endoscopy, result: positive for reflux esophagitis, upper GI, result: negative, esophageal manometry and ph/imedence: positive for reflux . Treatments tried so far include proton pump inhibitor: omeprazole.  He has had several episodes of heartburn associated with significant regurgitation since last here    Right Knee Pain  He continues to have occasional knee pain as previously described.  He denies  any change in the quality nor any new associated symptoms..  The pain is associated with mild stiffness but he denies any swelling, giving way, or locking.     The following portions of the patient's history were reviewed and updated as appropriate: allergies, current medications, past medical history, past social history, and problem list.    Review of Systems   Constitutional:  Positive for fatigue. Negative for appetite change, chills, fever and unexpected weight change.   HENT:  Positive for congestion, postnasal drip, rhinorrhea, sneezing and sore throat. Negative for ear pain.         Lesions left buccal mucosa   Eyes:  Negative for visual disturbance.   Respiratory:  Positive for cough and shortness of breath. Negative for wheezing.    Cardiovascular:  Negative for chest pain, palpitations and leg swelling.   Gastrointestinal:  Negative for abdominal pain, blood in stool, constipation, diarrhea, nausea and vomiting.   Endocrine: Negative for polydipsia and polyuria.   Genitourinary:  Positive for difficulty urinating, frequency and urgency. Negative for dysuria and hematuria.        Nocturia x 2-3, sense of incomplete voiding, postvoid dribbling, intermittent erectile dysfunction   Musculoskeletal:  Positive for arthralgias. Negative for back pain, joint swelling, myalgias and neck pain.   Skin:  Negative for rash.   Neurological:  Positive for headaches. Negative for tremors, weakness and numbness.   Psychiatric/Behavioral:  Negative for decreased concentration, dysphoric mood, sleep disturbance and suicidal ideas. The patient is not nervous/anxious.      Objective   Physical Exam  Constitutional:       General: He is not in acute distress.     Appearance: Normal appearance. He is well-developed. He is not ill-appearing or diaphoretic.      Comments: Alert and in fair spirits. No apparent distress. No pallor, jaundice, diaphoresis, or cyanosis.   HENT:      Head: Atraumatic.      Right Ear: Tympanic  membrane, ear canal and external ear normal.      Left Ear: Tympanic membrane, ear canal and external ear normal.      Mouth/Throat:      Lips: No lesions.      Mouth: Mucous membranes are moist. No oral lesions.      Pharynx: No oropharyngeal exudate or posterior oropharyngeal erythema.   Eyes:      General: Lids are normal. Gaze aligned appropriately. No visual field deficit.     Extraocular Movements: Extraocular movements intact.      Conjunctiva/sclera: Conjunctivae normal.      Pupils: Pupils are equal.   Neck:      Thyroid: No thyroid mass or thyromegaly.      Vascular: No carotid bruit or JVD.      Trachea: Trachea normal. No tracheal deviation.   Cardiovascular:      Rate and Rhythm: Normal rate and regular rhythm.      Heart sounds: Normal heart sounds, S1 normal and S2 normal. No murmur heard.    No gallop. No S3 or S4 sounds.   Pulmonary:      Effort: Pulmonary effort is normal.      Breath sounds: Normal breath sounds.   Abdominal:      General: Bowel sounds are normal. There is no distension or abdominal bruit.      Palpations: Abdomen is soft. There is no hepatomegaly, splenomegaly or mass.      Tenderness: There is no abdominal tenderness.      Hernia: No hernia is present.   Musculoskeletal:      Cervical back: No rigidity.      Right lower leg: No edema.      Left lower leg: No edema.   Lymphadenopathy:      Head:      Right side of head: No submental, submandibular, tonsillar, preauricular, posterior auricular or occipital adenopathy.      Left side of head: No submental, submandibular, tonsillar, preauricular, posterior auricular or occipital adenopathy.      Cervical: No cervical adenopathy.      Upper Body:      Right upper body: No supraclavicular adenopathy.      Left upper body: No supraclavicular adenopathy.   Skin:     General: Skin is warm and dry.      Coloration: Skin is not cyanotic, jaundiced or pale.      Findings: No rash.      Nails: There is no clubbing.   Neurological:       Mental Status: He is alert and oriented to person, place, and time.      Cranial Nerves: No cranial nerve deficit, dysarthria or facial asymmetry.      Sensory: No sensory deficit.      Motor: No weakness, tremor or abnormal muscle tone.      Coordination: Coordination normal. Finger-Nose-Finger Test normal.      Gait: Gait normal.      Deep Tendon Reflexes:      Reflex Scores:       Tricep reflexes are 1+ on the right side and 1+ on the left side.       Bicep reflexes are 2+ on the right side and 2+ on the left side.       Brachioradialis reflexes are 1+ on the right side and 1+ on the left side.       Patellar reflexes are 2+ on the right side and 2+ on the left side.       Achilles reflexes are 1+ on the right side and 1+ on the left side.  Psychiatric:         Attention and Perception: Attention normal.         Mood and Affect: Mood normal.         Speech: Speech normal.         Behavior: Behavior normal.         Thought Content: Thought content normal.     Assessment & Plan   Problems Addressed this Visit          Allergies and Adverse Reactions    Chronic seasonal allergic rhinitis   Continue current medication  Encouraged to report if any worse or if any new symptoms or concerns.       Cardiac and Vasculature    Mixed hyperlipidemia  Encouraged to continue to work on his diet and exercise plan.    Relevant Orders    Comprehensive Metabolic Panel    Lipid Panel    Shortness of breath on exertion  As above.       Endocrine and Metabolic    Class 1 obesity with serious comorbidity and body mass index (BMI) of 31.0 to 31.9 in adult    Hypothyroidism due to Hashimoto's thyroiditis  Clinically euthyroid.  Continue current medication.    Relevant Orders    TSH    Prediabetes  Improved with semaglutide. Will titrate to the 2 mg dose    Relevant Orders    POC Glycosylated Hemoglobin (Hb A1C) (Completed)    Comprehensive Metabolic Panel       Gastrointestinal Abdominal     Gastroesophageal reflux disease without  esophagitis  Reminded regarding lifestyle modification  Encouraged to report if any worse or if any new symptoms or concerns.    Relevant Orders    CBC & Differential    Non-alcoholic fatty liver disease    Relevant Orders    Comprehensive Metabolic Panel       Genitourinary and Reproductive     Benign prostatic hyperplasia with urinary frequency    Erectile dysfunction       Health Encounters    Healthcare maintenance  Recommended a flu shot along with an updated COVID-19 shot this fall.       Mental Health    Depression       Neuro    Bilateral headaches  Etiology unclear  Scheduled for updated labs with an ESR, CRP, lyme disease antibodies and a rickettsial panel  Patient will arrange an updated eye exam  Will arrange a neurology opinion  Encouraged to report if any worse or if any new symptoms or concerns in the meantime    Relevant Orders  CBC & Differential   C-reactive Protein   Sedimentation Rate   Rickettsia Species DNA, Real-Time PCR   Lyme Disease Total Antibody With Reflex to Immunoassay       Ambulatory Referral to Neurology (Completed)                                 Diagnoses         Codes Comments    Chronic seasonal allergic rhinitis    -  Primary ICD-10-CM: J30.2  ICD-9-CM: 477.8     Mixed hyperlipidemia     ICD-10-CM: E78.2  ICD-9-CM: 272.2     Prediabetes     ICD-10-CM: R73.03  ICD-9-CM: 790.29     Hypothyroidism due to Hashimoto's thyroiditis     ICD-10-CM: E03.8, E06.3  ICD-9-CM: 244.8, 245.2     Class 1 obesity with serious comorbidity and body mass index (BMI) of 31.0 to 31.9 in adult, unspecified obesity type     ICD-10-CM: E66.9, Z68.31  ICD-9-CM: 278.00, V85.31     Non-alcoholic fatty liver disease     ICD-10-CM: K76.0  ICD-9-CM: 571.8     Gastroesophageal reflux disease without esophagitis     ICD-10-CM: K21.9  ICD-9-CM: 530.81     Healthcare maintenance     ICD-10-CM: Z00.00  ICD-9-CM: V70.0     Migraine without aura and without status migrainosus, not intractable     ICD-10-CM:  G43.009  ICD-9-CM: 346.10     Bilateral headaches     ICD-10-CM: R51.9  ICD-9-CM: 784.0     Shortness of breath on exertion     ICD-10-CM: R06.02  ICD-9-CM: 786.05     Depression, unspecified depression type     ICD-10-CM: F32.A  ICD-9-CM: 311     Erectile dysfunction, unspecified erectile dysfunction type     ICD-10-CM: N52.9  ICD-9-CM: 607.84     Benign prostatic hyperplasia with urinary frequency     ICD-10-CM: N40.1, R35.0  ICD-9-CM: 600.01, 788.41           I spent 45 minutes caring for Fawad Morton on this date of service. This time includes time spent by me in the following activities:reviewing tests, performing a medically appropriate examination and/or evaluation , counseling and educating the patient/family/caregiver, ordering medications, tests, or procedures and documenting information in the medical record

## 2023-08-09 VITALS
WEIGHT: 232 LBS | TEMPERATURE: 98.3 F | HEIGHT: 72 IN | BODY MASS INDEX: 31.42 KG/M2 | SYSTOLIC BLOOD PRESSURE: 124 MMHG | HEART RATE: 96 BPM | OXYGEN SATURATION: 97 % | RESPIRATION RATE: 14 BRPM | DIASTOLIC BLOOD PRESSURE: 68 MMHG

## 2023-08-09 PROBLEM — M25.561 ACUTE PAIN OF RIGHT KNEE: Status: RESOLVED | Noted: 2022-10-08 | Resolved: 2023-08-09

## 2023-08-09 PROBLEM — R51.9 BILATERAL HEADACHES: Status: ACTIVE | Noted: 2023-08-09

## 2023-08-09 RX ORDER — SEMAGLUTIDE 2.68 MG/ML
2 INJECTION, SOLUTION SUBCUTANEOUS WEEKLY
Qty: 9 ML | Refills: 5 | Status: SHIPPED | OUTPATIENT
Start: 2023-08-09

## 2023-08-29 ENCOUNTER — LAB (OUTPATIENT)
Dept: FAMILY MEDICINE CLINIC | Facility: CLINIC | Age: 53
End: 2023-08-29
Payer: COMMERCIAL

## 2023-08-29 DIAGNOSIS — E03.8 HYPOTHYROIDISM DUE TO HASHIMOTO'S THYROIDITIS: ICD-10-CM

## 2023-08-29 DIAGNOSIS — E06.3 HYPOTHYROIDISM DUE TO HASHIMOTO'S THYROIDITIS: ICD-10-CM

## 2023-08-29 DIAGNOSIS — G43.009 MIGRAINE WITHOUT AURA AND WITHOUT STATUS MIGRAINOSUS, NOT INTRACTABLE: ICD-10-CM

## 2023-08-29 DIAGNOSIS — E78.2 MIXED HYPERLIPIDEMIA: ICD-10-CM

## 2023-08-29 DIAGNOSIS — K76.0 NON-ALCOHOLIC FATTY LIVER DISEASE: ICD-10-CM

## 2023-08-29 DIAGNOSIS — K21.9 GASTROESOPHAGEAL REFLUX DISEASE WITHOUT ESOPHAGITIS: ICD-10-CM

## 2023-08-29 DIAGNOSIS — R73.03 PREDIABETES: ICD-10-CM

## 2023-08-29 LAB
ALBUMIN SERPL-MCNC: 4.3 G/DL (ref 3.5–5.2)
ALBUMIN/GLOB SERPL: 1.5 G/DL
ALP SERPL-CCNC: 73 U/L (ref 39–117)
ALT SERPL W P-5'-P-CCNC: 28 U/L (ref 1–41)
ANION GAP SERPL CALCULATED.3IONS-SCNC: 9 MMOL/L (ref 5–15)
AST SERPL-CCNC: 20 U/L (ref 1–40)
BASOPHILS # BLD AUTO: 0.04 10*3/MM3 (ref 0–0.2)
BASOPHILS NFR BLD AUTO: 0.5 % (ref 0–1.5)
BILIRUB SERPL-MCNC: 0.4 MG/DL (ref 0–1.2)
BUN SERPL-MCNC: 13 MG/DL (ref 6–20)
BUN/CREAT SERPL: 11.9 (ref 7–25)
CALCIUM SPEC-SCNC: 9.9 MG/DL (ref 8.6–10.5)
CHLORIDE SERPL-SCNC: 103 MMOL/L (ref 98–107)
CHOLEST SERPL-MCNC: 199 MG/DL (ref 0–200)
CO2 SERPL-SCNC: 25 MMOL/L (ref 22–29)
CREAT SERPL-MCNC: 1.09 MG/DL (ref 0.76–1.27)
CRP SERPL-MCNC: <0.3 MG/DL (ref 0–0.5)
DEPRECATED RDW RBC AUTO: 41.5 FL (ref 37–54)
EGFRCR SERPLBLD CKD-EPI 2021: 81.2 ML/MIN/1.73
EOSINOPHIL # BLD AUTO: 0.12 10*3/MM3 (ref 0–0.4)
EOSINOPHIL NFR BLD AUTO: 1.6 % (ref 0.3–6.2)
ERYTHROCYTE [DISTWIDTH] IN BLOOD BY AUTOMATED COUNT: 12.6 % (ref 12.3–15.4)
ERYTHROCYTE [SEDIMENTATION RATE] IN BLOOD: 15 MM/HR (ref 0–20)
GLOBULIN UR ELPH-MCNC: 2.9 GM/DL
GLUCOSE SERPL-MCNC: 95 MG/DL (ref 65–99)
HCT VFR BLD AUTO: 45.8 % (ref 37.5–51)
HDLC SERPL-MCNC: 40 MG/DL (ref 40–60)
HGB BLD-MCNC: 15.6 G/DL (ref 13–17.7)
IMM GRANULOCYTES # BLD AUTO: 0.02 10*3/MM3 (ref 0–0.05)
IMM GRANULOCYTES NFR BLD AUTO: 0.3 % (ref 0–0.5)
LDLC SERPL CALC-MCNC: 133 MG/DL (ref 0–100)
LDLC/HDLC SERPL: 3.27 {RATIO}
LYMPHOCYTES # BLD AUTO: 2.14 10*3/MM3 (ref 0.7–3.1)
LYMPHOCYTES NFR BLD AUTO: 29 % (ref 19.6–45.3)
MCH RBC QN AUTO: 30.5 PG (ref 26.6–33)
MCHC RBC AUTO-ENTMCNC: 34.1 G/DL (ref 31.5–35.7)
MCV RBC AUTO: 89.6 FL (ref 79–97)
MONOCYTES # BLD AUTO: 0.71 10*3/MM3 (ref 0.1–0.9)
MONOCYTES NFR BLD AUTO: 9.6 % (ref 5–12)
NEUTROPHILS NFR BLD AUTO: 4.34 10*3/MM3 (ref 1.7–7)
NEUTROPHILS NFR BLD AUTO: 59 % (ref 42.7–76)
NRBC BLD AUTO-RTO: 0 /100 WBC (ref 0–0.2)
PLATELET # BLD AUTO: 259 10*3/MM3 (ref 140–450)
PMV BLD AUTO: 10.6 FL (ref 6–12)
POTASSIUM SERPL-SCNC: 4.6 MMOL/L (ref 3.5–5.2)
PROT SERPL-MCNC: 7.2 G/DL (ref 6–8.5)
RBC # BLD AUTO: 5.11 10*6/MM3 (ref 4.14–5.8)
SODIUM SERPL-SCNC: 137 MMOL/L (ref 136–145)
TRIGL SERPL-MCNC: 142 MG/DL (ref 0–150)
TSH SERPL DL<=0.05 MIU/L-ACNC: 1.45 UIU/ML (ref 0.27–4.2)
VLDLC SERPL-MCNC: 26 MG/DL (ref 5–40)
WBC NRBC COR # BLD: 7.37 10*3/MM3 (ref 3.4–10.8)

## 2023-08-29 PROCEDURE — 86618 LYME DISEASE ANTIBODY: CPT | Performed by: GENERAL PRACTICE

## 2023-08-29 PROCEDURE — 80061 LIPID PANEL: CPT | Performed by: GENERAL PRACTICE

## 2023-08-29 PROCEDURE — 36415 COLL VENOUS BLD VENIPUNCTURE: CPT | Performed by: GENERAL PRACTICE

## 2023-08-29 PROCEDURE — 80050 GENERAL HEALTH PANEL: CPT | Performed by: GENERAL PRACTICE

## 2023-08-29 PROCEDURE — 86140 C-REACTIVE PROTEIN: CPT | Performed by: GENERAL PRACTICE

## 2023-08-29 PROCEDURE — 85652 RBC SED RATE AUTOMATED: CPT | Performed by: GENERAL PRACTICE

## 2023-08-29 PROCEDURE — 87798 DETECT AGENT NOS DNA AMP: CPT | Performed by: GENERAL PRACTICE

## 2023-08-30 LAB — B BURGDOR IGG+IGM SER QL IA: NEGATIVE

## 2023-09-02 LAB — RICKETTSIA RICKETTSII DNA, RT: NOT DETECTED

## 2023-11-10 ENCOUNTER — OFFICE VISIT (OUTPATIENT)
Dept: FAMILY MEDICINE CLINIC | Facility: CLINIC | Age: 53
End: 2023-11-10
Payer: COMMERCIAL

## 2023-11-10 DIAGNOSIS — R06.02 SHORTNESS OF BREATH ON EXERTION: ICD-10-CM

## 2023-11-10 DIAGNOSIS — G43.009 MIGRAINE WITHOUT AURA AND WITHOUT STATUS MIGRAINOSUS, NOT INTRACTABLE: ICD-10-CM

## 2023-11-10 DIAGNOSIS — E06.3 HYPOTHYROIDISM DUE TO HASHIMOTO'S THYROIDITIS: ICD-10-CM

## 2023-11-10 DIAGNOSIS — Z00.00 HEALTHCARE MAINTENANCE: ICD-10-CM

## 2023-11-10 DIAGNOSIS — R73.03 PREDIABETES: ICD-10-CM

## 2023-11-10 DIAGNOSIS — K76.0 NON-ALCOHOLIC FATTY LIVER DISEASE: ICD-10-CM

## 2023-11-10 DIAGNOSIS — E78.2 MIXED HYPERLIPIDEMIA: ICD-10-CM

## 2023-11-10 DIAGNOSIS — E66.9 CLASS 1 OBESITY WITH SERIOUS COMORBIDITY AND BODY MASS INDEX (BMI) OF 31.0 TO 31.9 IN ADULT, UNSPECIFIED OBESITY TYPE: ICD-10-CM

## 2023-11-10 DIAGNOSIS — N52.9 ERECTILE DYSFUNCTION, UNSPECIFIED ERECTILE DYSFUNCTION TYPE: ICD-10-CM

## 2023-11-10 DIAGNOSIS — J30.2 CHRONIC SEASONAL ALLERGIC RHINITIS: Primary | ICD-10-CM

## 2023-11-10 DIAGNOSIS — Z12.5 ENCOUNTER FOR SCREENING FOR MALIGNANT NEOPLASM OF PROSTATE: ICD-10-CM

## 2023-11-10 DIAGNOSIS — N40.1 BENIGN PROSTATIC HYPERPLASIA WITH URINARY FREQUENCY: ICD-10-CM

## 2023-11-10 DIAGNOSIS — R35.0 BENIGN PROSTATIC HYPERPLASIA WITH URINARY FREQUENCY: ICD-10-CM

## 2023-11-10 DIAGNOSIS — K21.9 GASTROESOPHAGEAL REFLUX DISEASE WITHOUT ESOPHAGITIS: ICD-10-CM

## 2023-11-10 DIAGNOSIS — E03.8 HYPOTHYROIDISM DUE TO HASHIMOTO'S THYROIDITIS: ICD-10-CM

## 2023-11-10 DIAGNOSIS — M54.59 MECHANICAL LOW BACK PAIN: ICD-10-CM

## 2023-11-10 RX ORDER — LEVOTHYROXINE SODIUM 88 MCG
88 TABLET ORAL DAILY
Qty: 90 TABLET | Refills: 3 | Status: SHIPPED | OUTPATIENT
Start: 2023-11-10

## 2023-11-10 NOTE — PROGRESS NOTES
Subjective   Fawad Morton is a 53 y.o. male.     Chief Complaint  He returns for a scheduled reassessment of multiple medical problems including headaches, shortness of breath with exertion, chronic allergic rhinitis, hypothyroidism, hyperlipidemia, prediabetes, and gastroesophageal reflux disease    History of Present Illness     Headaches  He discontinued semaglutide since last here with a prompt resolution of his headaches.  He continues to deny any changes in his vision, strength, or sensation, and there is no history of any nausea, vomiting, fever, chills, or night sweats.  Lab Results   Component Value Date    WBC 7.37 08/29/2023    HGB 15.6 08/29/2023    HCT 45.8 08/29/2023    MCV 89.6 08/29/2023     08/29/2023     Lab Results   Component Value Date    SEDRATE 15 08/29/2023     Lab Results   Component Value Date    CRP <0.30 08/29/2023     Shortness of Breath With Exertion  He has a history of short of breath with above average exertion.  This generally occurs with sudden spurts of significant exertion.he has noted some improvement since last here, and continues to deny any chest pain, orthopnea, PND, cough, calf pain or swelling of the ankles. CT coronary angiogram done on 3/23/15 was unremarkable, as was a stress test performed on 2/11/15    Chronic Allergic Rhinitis  He has a long history of intermittent sneezing, clear rhinorrhea, nasal congestion and postnasal drip. These symptoms are perennial with seasonal exacerbation. He has tried a number of antihistamines and nasal corticosteroids with a limited relief of symptoms.  He remains on OTC pseudoephedrine with some benefit and no apparent side effects    Hypothyroidism  He has a history of hypothyroidism associated with Hashimoto's disease. He remains on levothyroxine 88 qd.  Lab Results   Component Value Date    TSH 1.450 08/29/2023     Hyperlipidemia  His compliance with treatment has been fair.  He is currently being prescribed the following  medication for his dyslipidemia - lifestyle modifcation.   Lab Results   Component Value Date    CHOL 199 08/29/2023    CHLPL 195 01/27/2016    TRIG 142 08/29/2023    HDL 40 08/29/2023     (H) 08/29/2023      Prediabetes  He has been unable to tolerate metformin or semaglutide    Nocturia  He gives a long history of nocturia.  He has experienced increasing daytime frequency associated with occasional urgency, a sense of incomplete voiding, and postvoid dribbling.  Over the last year or two he has had intermittent difficulty obtaining and maintaining an erection.  He denies any hesitancy or decrease in his stream and there is no history of dysuria or hematuria.  He denies any change in his voice or beard growth and there is no history of any lower extremity weakness, numbness, or tingling.  He underwent a recent urology assessment  with a trial of daily tadalafil with little impact on his symptoms.    GERD  He has a long history of of heartburn.  He continues to deny any abdominal bloating, dysphagia, dyspepsia, hematemesis, hoarseness, melena, nausea or unexpected weight loss. Symptoms appear to be worsened by large meals and lying down. Risk factors present for GERD include caffeine use. Risk factors absent for GERD are tobacco abuse, alcohol use and NSAID use. Studies performed so far include upper endoscopy, result: positive for reflux esophagitis, upper GI, result: negative, esophageal manometry and ph/imedence: positive for reflux . Treatments tried so far include proton pump inhibitor: omeprazole.  He denies any recent episodes    The following portions of the patient's history were reviewed and updated as appropriate: allergies, current medications, past medical history, past social history, and problem list.    Review of Systems   Constitutional:  Positive for fatigue. Negative for appetite change, chills, fever and unexpected weight change.   HENT:  Positive for congestion, postnasal drip, rhinorrhea  and sneezing. Negative for ear pain and sore throat.    Eyes:  Negative for visual disturbance.   Respiratory:  Positive for cough and shortness of breath. Negative for wheezing.    Cardiovascular:  Negative for chest pain, palpitations and leg swelling.   Gastrointestinal:  Negative for abdominal pain, blood in stool, constipation, diarrhea, nausea and vomiting.   Endocrine: Negative for polydipsia and polyuria.   Genitourinary:  Positive for difficulty urinating (with an intermittent sense of incomplete voiding along with postvoid dribbling), frequency (with nocturia x 2-3) and urgency. Negative for dysuria and hematuria.        Intermittent erectile dysfunction   Musculoskeletal:  Positive for arthralgias. Negative for back pain, joint swelling, myalgias and neck pain.   Skin:  Negative for rash.   Neurological:  Negative for tremors, weakness, numbness and headaches.   Psychiatric/Behavioral:  Negative for decreased concentration, dysphoric mood, sleep disturbance and suicidal ideas. The patient is not nervous/anxious.      Objective   Physical Exam  Constitutional:       General: He is not in acute distress.     Appearance: Normal appearance. He is well-developed. He is not ill-appearing or diaphoretic.      Comments: Bright and in good spirits. No apparent distress. No pallor, jaundice, diaphoresis, or cyanosis   HENT:      Head: Atraumatic.      Right Ear: Tympanic membrane, ear canal and external ear normal.      Left Ear: Tympanic membrane, ear canal and external ear normal.      Mouth/Throat:      Lips: No lesions.      Mouth: Mucous membranes are moist. No oral lesions.      Pharynx: No oropharyngeal exudate or posterior oropharyngeal erythema.   Eyes:      General: Lids are normal. Gaze aligned appropriately. No visual field deficit.     Extraocular Movements: Extraocular movements intact.      Conjunctiva/sclera: Conjunctivae normal.      Pupils: Pupils are equal.   Neck:      Thyroid: No thyroid mass  or thyromegaly.      Vascular: No carotid bruit or JVD.      Trachea: Trachea normal. No tracheal deviation.   Cardiovascular:      Rate and Rhythm: Normal rate and regular rhythm.      Heart sounds: Normal heart sounds, S1 normal and S2 normal. No murmur heard.     No gallop. No S3 or S4 sounds.   Pulmonary:      Effort: Pulmonary effort is normal.      Breath sounds: Normal breath sounds.   Abdominal:      General: Bowel sounds are normal. There is no distension.   Musculoskeletal:      Cervical back: No rigidity.      Right lower leg: No edema.      Left lower leg: No edema.   Lymphadenopathy:      Head:      Right side of head: No submental, submandibular, tonsillar, preauricular, posterior auricular or occipital adenopathy.      Left side of head: No submental, submandibular, tonsillar, preauricular, posterior auricular or occipital adenopathy.      Cervical: No cervical adenopathy.      Upper Body:      Right upper body: No supraclavicular adenopathy.      Left upper body: No supraclavicular adenopathy.   Skin:     General: Skin is warm and dry.      Coloration: Skin is not cyanotic, jaundiced or pale.      Findings: No rash.      Nails: There is no clubbing.   Neurological:      Mental Status: He is alert and oriented to person, place, and time.      Cranial Nerves: No cranial nerve deficit, dysarthria or facial asymmetry.      Sensory: No sensory deficit.      Motor: No weakness, tremor or abnormal muscle tone.      Coordination: Coordination normal. Finger-Nose-Finger Test normal.      Gait: Gait normal.   Psychiatric:         Attention and Perception: Attention normal.         Mood and Affect: Mood normal.         Speech: Speech normal.         Behavior: Behavior normal.         Thought Content: Thought content normal.       Assessment & Plan   Problems Addressed this Visit          Allergies and Adverse Reactions    Chronic seasonal allergic rhinitis   Continue current medication  Encouraged to report if  any worse or if any new symptoms or concerns.       Cardiac and Vasculature    Mixed hyperlipidemia  Encouraged to continue to work on his diet and exercise plan.    Relevant Orders    Comprehensive Metabolic Panel    Lipid Panel    Shortness of breath on exertion  Encouraged to report if any worse or if any new symptoms or concerns.    Relevant Orders    CBC & Differential       Endocrine and Metabolic    Class 1 obesity with serious comorbidity and body mass index (BMI) of 31.0 to 31.9 in adult    Hypothyroidism due to Hashimoto's thyroiditis  Clinically and bio-chemically euthyroid.  Continue current medication.  Scheduled for updated labs just prior to his return    Relevant Medications    Synthroid 88 MCG tablet    Other Relevant Orders    TSH    Prediabetes  As above.  Will continue to monitor    Relevant Orders    Comprehensive Metabolic Panel    Hemoglobin A1c       Gastrointestinal Abdominal     Gastroesophageal reflux disease without esophagitis   Symptoms are currently well controlled.  Encouraged to report if this should change.    Relevant Orders    CBC & Differential    Non-alcoholic fatty liver disease       Genitourinary and Reproductive     Benign prostatic hyperplasia with urinary frequency  Follow up with urology    Erectile dysfunction       Health Encounters    Healthcare maintenance  Patient uninterested in a flu shot or any another COVID-19 shot  Reminded that he is due for a screening colonoscopy    Relevant Orders    PSA SCREENING       Musculoskeletal and Injuries    Mechanical low back pain       Neuro    Migraine without aura and without status migrainosus, not intractable    Relevant Orders    CBC & Differential     Diagnoses         Codes Comments    Chronic seasonal allergic rhinitis    -  Primary ICD-10-CM: J30.2  ICD-9-CM: 477.8     Mixed hyperlipidemia     ICD-10-CM: E78.2  ICD-9-CM: 272.2     Prediabetes     ICD-10-CM: R73.03  ICD-9-CM: 790.29     Hypothyroidism due to Hashimoto's  thyroiditis     ICD-10-CM: E03.8, E06.3  ICD-9-CM: 244.8, 245.2     Class 1 obesity with serious comorbidity and body mass index (BMI) of 31.0 to 31.9 in adult, unspecified obesity type     ICD-10-CM: E66.9, Z68.31  ICD-9-CM: 278.00, V85.31     Non-alcoholic fatty liver disease     ICD-10-CM: K76.0  ICD-9-CM: 571.8     Gastroesophageal reflux disease without esophagitis     ICD-10-CM: K21.9  ICD-9-CM: 530.81     Benign prostatic hyperplasia with urinary frequency     ICD-10-CM: N40.1, R35.0  ICD-9-CM: 600.01, 788.41     Erectile dysfunction, unspecified erectile dysfunction type     ICD-10-CM: N52.9  ICD-9-CM: 607.84     Healthcare maintenance     ICD-10-CM: Z00.00  ICD-9-CM: V70.0     Mechanical low back pain     ICD-10-CM: M54.59  ICD-9-CM: 724.2     Migraine without aura and without status migrainosus, not intractable     ICD-10-CM: G43.009  ICD-9-CM: 346.10     Shortness of breath on exertion     ICD-10-CM: R06.02  ICD-9-CM: 786.05     Encounter for screening for malignant neoplasm of prostate     ICD-10-CM: Z12.5  ICD-9-CM: V76.44

## 2023-11-11 VITALS
RESPIRATION RATE: 14 BRPM | DIASTOLIC BLOOD PRESSURE: 68 MMHG | SYSTOLIC BLOOD PRESSURE: 120 MMHG | TEMPERATURE: 98.6 F | HEIGHT: 72 IN | HEART RATE: 89 BPM | BODY MASS INDEX: 31.02 KG/M2 | OXYGEN SATURATION: 94 % | WEIGHT: 229 LBS

## 2023-11-11 RX ORDER — VALACYCLOVIR HYDROCHLORIDE 1 G/1
1000 TABLET, FILM COATED ORAL 2 TIMES DAILY
Qty: 12 TABLET | Refills: 0 | Status: SHIPPED | OUTPATIENT
Start: 2023-11-11

## 2024-01-10 RX ORDER — VALACYCLOVIR HYDROCHLORIDE 1 G/1
1000 TABLET, FILM COATED ORAL 2 TIMES DAILY
Qty: 12 TABLET | Refills: 0 | Status: SHIPPED | OUTPATIENT
Start: 2024-01-10

## 2024-03-13 ENCOUNTER — OFFICE VISIT (OUTPATIENT)
Dept: FAMILY MEDICINE CLINIC | Facility: CLINIC | Age: 54
End: 2024-03-13
Payer: COMMERCIAL

## 2024-03-13 VITALS
HEIGHT: 72 IN | BODY MASS INDEX: 31.8 KG/M2 | DIASTOLIC BLOOD PRESSURE: 80 MMHG | OXYGEN SATURATION: 97 % | WEIGHT: 234.8 LBS | HEART RATE: 82 BPM | TEMPERATURE: 96.8 F | SYSTOLIC BLOOD PRESSURE: 130 MMHG

## 2024-03-13 DIAGNOSIS — J06.9 VIRAL URI WITH COUGH: Primary | ICD-10-CM

## 2024-03-13 NOTE — PROGRESS NOTES
"    Subjective        Chief Complaint  Sore Throat, Headache, and Generalized Body Aches    Subjective      Fawad Morton is a 54 y.o. male who presents today to [facility] for Sore Throat, Headache, and Generalized Body Aches. He has a past medical history of Allergic sinusitis, GERD, Hyperlipidemia, Hypothyroidism, Mechanical low back pain, Migraine, Non-alcoholic fatty liver disease, Prediabetes.    Sore Throat, Headache, and Generalized Body Aches:  He reports about 2 days of feeling poorly with sinus congestion, runny nose, sore throat, headache, chills, generalized myalgias.  He is unsure about any fever.  He did recently have some family traveling from out of state visit.  He also recently visited an airport, but no specific known sick contacts.  No recent COVID or flu infections.  He did test for COVID with an at home test this morning and this was negative.  No reports of nausea, vomiting, diarrhea.  He has been taking Mucinex DM, Sudafed, and Tylenol.      Current Outpatient Medications:     Glucose Blood (Blood Glucose Test) strip, Apply 1 each topically to the appropriate area as directed Daily As Needed (glucose check). 1 daily, Disp: 30 each, Rfl: 5    Lancets misc, Apply 1 each topically to the appropriate area as directed Daily As Needed (glucose check). 1 daily, Disp: 30 each, Rfl: 5    Pseudoephedrine HCl  MG tablet sustained-release 24 hour, Take 1 tablet by mouth Daily As Needed (nasal congestion)., Disp: 30 each, Rfl: 5    Synthroid 88 MCG tablet, Take 1 tablet by mouth Daily., Disp: 90 tablet, Rfl: 3    valACYclovir (Valtrex) 1000 MG tablet, Take 1 tablet by mouth 2 (Two) Times a Day., Disp: 12 tablet, Rfl: 0      Allergies   Allergen Reactions    Penicillins        Objective     Objective   Vital Signs:  /80   Pulse 82   Temp 96.8 °F (36 °C) (Temporal)   Ht 182.9 cm (72.01\")   Wt 107 kg (234 lb 12.8 oz)   SpO2 97%   BMI 31.84 kg/m² " "  Estimated body mass index is 31.84 kg/m² as calculated from the following:    Height as of this encounter: 182.9 cm (72.01\").    Weight as of this encounter: 107 kg (234 lb 12.8 oz).    Past Medical History:   Diagnosis Date    Allergic sinusitis     GERD (gastroesophageal reflux disease)     Hyperlipidemia     Hypogonadism, male     Hypothyroidism     Mechanical low back pain     Migraine     Non-alcoholic fatty liver disease     Prediabetes     Situational anxiety     Skin tag      No past surgical history on file.  Social History     Socioeconomic History    Marital status:      Spouse name: andriy    Number of children: 4    Years of education: 18   Tobacco Use    Smoking status: Never     Passive exposure: Never    Smokeless tobacco: Never   Vaping Use    Vaping status: Never Used   Substance and Sexual Activity    Alcohol use: Yes     Alcohol/week: 1.0 standard drink of alcohol     Types: 1 Cans of beer per week    Drug use: No    Sexual activity: Defer      Physical Exam  Vitals and nursing note reviewed.   Constitutional:       General: He is not in acute distress.     Appearance: He is well-developed. He is not diaphoretic.   HENT:      Head: Normocephalic and atraumatic.      Right Ear: Tympanic membrane normal.      Left Ear: Tympanic membrane normal.      Nose: Congestion and rhinorrhea present.      Mouth/Throat:      Pharynx: Posterior oropharyngeal erythema present. No oropharyngeal exudate.   Eyes:      General: No scleral icterus.        Right eye: No discharge.         Left eye: No discharge.      Conjunctiva/sclera: Conjunctivae normal.   Cardiovascular:      Rate and Rhythm: Normal rate and regular rhythm.      Heart sounds: Normal heart sounds. No murmur heard.     No friction rub. No gallop.   Pulmonary:      Effort: Pulmonary effort is normal. No respiratory distress.      Breath sounds: Normal breath sounds. No wheezing or rales.   Chest:      Chest wall: No tenderness. "   Musculoskeletal:         General: Normal range of motion.      Cervical back: Normal range of motion and neck supple.   Lymphadenopathy:      Cervical: No cervical adenopathy.   Skin:     General: Skin is warm and dry.      Coloration: Skin is not pale.      Findings: No erythema or rash.   Neurological:      Mental Status: He is alert and oriented to person, place, and time.   Psychiatric:         Behavior: Behavior normal.        Result Review :  The following data was reviewed by: CRISTIAN Delacruz on 03/13/2024:  Hemoglobin A1C   Date Value Ref Range Status   08/08/2023 5.3 % Final   04/12/2023 6.00 (H) 4.80 - 5.60 % Final     TSH   Date Value Ref Range Status   08/29/2023 1.450 0.270 - 4.200 uIU/mL Final     HDL Cholesterol   Date Value Ref Range Status   08/29/2023 40 40 - 60 mg/dL Final     LDL Cholesterol    Date Value Ref Range Status   08/29/2023 133 (H) 0 - 100 mg/dL Final     Triglycerides   Date Value Ref Range Status   08/29/2023 142 0 - 150 mg/dL Final     Total Cholesterol   Date Value Ref Range Status   01/27/2016 195 0 - 200 mg/dL Final     Comment:       Cholesterol Reference Range:      Desirable                 < 200mg/dl      Borderline High        200-239mg/dl      High Risk                 > 239mg/dl             Assessment / Plan         Assessment   Diagnoses and all orders for this visit:    1. Viral URI with cough (Primary)  Rapid Flu A/B negative. He took an at home Covid test this morning and it was negative.   Suspected viral respiratory illness.   Continue supportive care with rest, adequate oral fluid intake, symptomatic care.   Continue as needed ibuprofen/Tylenol for fever, myalgias, or headaches.  Could consider adding a daily antihistamine and saline nasal spray.   Can continue mucinex and sudafed PRN.   Return to clinic if no improvement noted or if symptoms are worsening.   -     POC Influenza A / B    Follow Up   Return if symptoms worsen or fail to improve.    Patient was  given instructions and counseling regarding his condition or for health maintenance advice. Please see specific information pulled into the AVS if appropriate.       This document has been electronically signed by CRISTIAN Delacruz   March 13, 2024 11:18 EDT    Dictated Utilizing Dragon Dictation: Part of this note may be an electronic transcription/translation of spoken language to printed text using the Dragon Dictation System.

## 2024-03-22 ENCOUNTER — TELEPHONE (OUTPATIENT)
Dept: FAMILY MEDICINE CLINIC | Facility: CLINIC | Age: 54
End: 2024-03-22

## 2024-03-22 RX ORDER — DOXYCYCLINE HYCLATE 100 MG/1
100 CAPSULE ORAL 2 TIMES DAILY
Qty: 20 CAPSULE | Refills: 0 | Status: SHIPPED | OUTPATIENT
Start: 2024-03-22 | End: 2024-04-01

## 2024-03-22 NOTE — TELEPHONE ENCOUNTER
Name: Fawad Morton      Relationship: Self      Best Callback Number: [phone]       HUB PROVIDED THE RELAY MESSAGE FROM THE OFFICE      PATIENT: VOICED UNDERSTANDING AND HAS NO FURTHER QUESTIONS AT THIS TIME    ADDITIONAL INFORMATION:

## 2024-03-22 NOTE — TELEPHONE ENCOUNTER
Caller: Fawad Morton    Relationship: Self    Best call back number: [phone]    What medication are you requesting:     SOMETHING FOR SYMPTOMS    What are your current symptoms:     COUGH, CONGESTION, HAVING TROUBLE TAKING DEEP BREATH    How long have you been experiencing symptoms: SINCE AT LEAST 3/13 APPOINTMENT     If a prescription is needed, what is your preferred pharmacy and phone number:      [id number] [place] [address] AT [facility] [address]  - [phone]  - [phone] FX

## 2024-04-26 ENCOUNTER — OFFICE VISIT (OUTPATIENT)
Dept: FAMILY MEDICINE CLINIC | Facility: CLINIC | Age: 54
End: 2024-04-26
Payer: COMMERCIAL

## 2024-04-26 DIAGNOSIS — G89.29 CHRONIC PAIN OF RIGHT ANKLE: ICD-10-CM

## 2024-04-26 DIAGNOSIS — R06.02 SHORTNESS OF BREATH ON EXERTION: ICD-10-CM

## 2024-04-26 DIAGNOSIS — K76.0 NON-ALCOHOLIC FATTY LIVER DISEASE: ICD-10-CM

## 2024-04-26 DIAGNOSIS — Z00.00 HEALTHCARE MAINTENANCE: ICD-10-CM

## 2024-04-26 DIAGNOSIS — G43.009 MIGRAINE WITHOUT AURA AND WITHOUT STATUS MIGRAINOSUS, NOT INTRACTABLE: ICD-10-CM

## 2024-04-26 DIAGNOSIS — J30.2 CHRONIC SEASONAL ALLERGIC RHINITIS: Primary | ICD-10-CM

## 2024-04-26 DIAGNOSIS — M54.59 MECHANICAL LOW BACK PAIN: ICD-10-CM

## 2024-04-26 DIAGNOSIS — M25.571 CHRONIC PAIN OF RIGHT ANKLE: ICD-10-CM

## 2024-04-26 DIAGNOSIS — N40.1 BENIGN PROSTATIC HYPERPLASIA WITH URINARY FREQUENCY: ICD-10-CM

## 2024-04-26 DIAGNOSIS — R35.0 BENIGN PROSTATIC HYPERPLASIA WITH URINARY FREQUENCY: ICD-10-CM

## 2024-04-26 DIAGNOSIS — E06.3 HYPOTHYROIDISM DUE TO HASHIMOTO'S THYROIDITIS: ICD-10-CM

## 2024-04-26 DIAGNOSIS — E03.8 HYPOTHYROIDISM DUE TO HASHIMOTO'S THYROIDITIS: ICD-10-CM

## 2024-04-26 DIAGNOSIS — R73.03 PREDIABETES: ICD-10-CM

## 2024-04-26 DIAGNOSIS — N52.9 ERECTILE DYSFUNCTION, UNSPECIFIED ERECTILE DYSFUNCTION TYPE: ICD-10-CM

## 2024-04-26 DIAGNOSIS — K21.9 GASTROESOPHAGEAL REFLUX DISEASE WITHOUT ESOPHAGITIS: ICD-10-CM

## 2024-04-26 DIAGNOSIS — Z12.5 ENCOUNTER FOR SCREENING FOR MALIGNANT NEOPLASM OF PROSTATE: ICD-10-CM

## 2024-04-26 DIAGNOSIS — M72.2 PLANTAR FASCIITIS: ICD-10-CM

## 2024-04-26 DIAGNOSIS — E66.9 CLASS 1 OBESITY WITH SERIOUS COMORBIDITY AND BODY MASS INDEX (BMI) OF 32.0 TO 32.9 IN ADULT, UNSPECIFIED OBESITY TYPE: ICD-10-CM

## 2024-04-26 DIAGNOSIS — E78.2 MIXED HYPERLIPIDEMIA: ICD-10-CM

## 2024-04-26 PROBLEM — E66.811 CLASS 1 OBESITY WITH SERIOUS COMORBIDITY AND BODY MASS INDEX (BMI) OF 31.0 TO 31.9 IN ADULT: Status: RESOLVED | Noted: 2021-02-26 | Resolved: 2024-04-26

## 2024-04-26 PROCEDURE — 80061 LIPID PANEL: CPT | Performed by: GENERAL PRACTICE

## 2024-04-26 PROCEDURE — 80050 GENERAL HEALTH PANEL: CPT | Performed by: GENERAL PRACTICE

## 2024-04-26 PROCEDURE — 83036 HEMOGLOBIN GLYCOSYLATED A1C: CPT | Performed by: GENERAL PRACTICE

## 2024-04-26 PROCEDURE — G0103 PSA SCREENING: HCPCS | Performed by: GENERAL PRACTICE

## 2024-04-26 NOTE — PROGRESS NOTES
Subjective   Fawad Morton is a 54 y.o. male.     Chief Complaint  He returns for a scheduled reassessment of multiple medical problems including shortness of breath on exertion, chronic allergic rhinitis, hypothyroidism, hyperlipidemia, prediabetes, gastroesophageal reflux disease, and plantar fasciitis    History of Present Illness     Plantar Fasciitis  He has a history of recurrent right plantar fasciitis.  He is currently seeing podiatry and has received several corticosteroid injections with an improvement.  He is generally wearing appropriate footwear and doing stretches.    Shortness of Breath With Exertion  He has a history of short of breath with above average exertion.  This generally occurs with sudden spurts of significant exertion. He continues to deny any chest pain, orthopnea, PND, cough, calf pain or swelling of the ankles. CT coronary angiogram done on 3/23/15 was unremarkable, as was a stress test performed on 2/11/15    Chronic Allergic Rhinitis  He has a long history of intermittent sneezing, clear rhinorrhea, nasal congestion and postnasal drip. These symptoms are perennial with seasonal exacerbation. He has tried a number of antihistamines and nasal corticosteroids with a limited relief of symptoms.  He remains on OTC pseudoephedrine with some benefit and no apparent side effects    Hypothyroidism  He has a history of hypothyroidism associated with Hashimoto's disease. He remains on levothyroxine 88 qd.    Hyperlipidemia  His compliance with treatment has been fair.  He is currently being prescribed the following medication for his dyslipidemia - lifestyle modifcation.      Prediabetes  He has been unable to tolerate metformin or semaglutide    Nocturia  He gives a long history of nocturia.  He has experienced increasing daytime frequency associated with occasional urgency, a sense of incomplete voiding, and postvoid dribbling.  Over the last year or two he has had intermittent difficulty  obtaining and maintaining an erection.  He continues to deny any hesitancy or decrease in his stream and there is no history of dysuria or hematuria.  He continues to deny any change in his voice or beard growth and there is no history of any lower extremity weakness, numbness, or tingling.  He underwent a recent urology assessment  with a trial of daily tadalafil with little impact on his symptoms.    GERD  He has a long history of of heartburn.  He continues to deny any abdominal bloating, dysphagia, dyspepsia, hematemesis, hoarseness, melena, nausea or unexpected weight loss. Symptoms appear to be worsened by large meals and lying down. Risk factors present for GERD include caffeine use. Risk factors absent for GERD are tobacco abuse, alcohol use and NSAID use. Studies performed so far include upper endoscopy, result: positive for reflux esophagitis, upper GI, result: negative, esophageal manometry and ph/imedence: positive for reflux . Treatments tried so far include proton pump inhibitor: omeprazole.  He denies any recent episodes    Headaches  He continues to deny any further headaches since discontinuing semaglutide.    The following portions of the patient's history were reviewed and updated as appropriate: allergies, current medications, past medical history, past social history, and problem list.    Review of Systems   Constitutional:  Positive for fatigue. Negative for chills and fever.   HENT:  Positive for congestion, postnasal drip, rhinorrhea, sneezing and voice change (hoarse over the last few days). Negative for ear pain, sinus pressure and sore throat.    Eyes:  Negative for visual disturbance.   Respiratory:  Positive for cough and shortness of breath. Negative for wheezing.    Cardiovascular:  Negative for chest pain, palpitations and leg swelling.   Gastrointestinal:  Positive for GERD. Negative for abdominal pain, blood in stool, constipation, diarrhea, nausea and vomiting.   Genitourinary:   Positive for difficulty urinating (occasional sense of incomplete voiding and/or postvoid dribbling), frequency, nocturia (2-3), erectile dysfunction (intermittent) and urgency (intermittent). Negative for dysuria, hematuria and urinary incontinence.   Musculoskeletal:  Positive for arthralgias. Negative for back pain, joint swelling, myalgias and neck pain.   Skin:  Negative for rash.   Neurological:  Negative for weakness and numbness.   Psychiatric/Behavioral:  Negative for depressed mood. The patient is not nervous/anxious.      Objective   Physical Exam  Constitutional:       General: He is not in acute distress.     Appearance: Normal appearance. He is well-developed. He is not ill-appearing or diaphoretic.      Comments: Bright and in good spirits.  Voice slightly dysphonic.  No apparent distress. No pallor, jaundice, diaphoresis, or cyanosis   HENT:      Head: Atraumatic.      Right Ear: Tympanic membrane, ear canal and external ear normal.      Left Ear: Tympanic membrane, ear canal and external ear normal.      Mouth/Throat:      Lips: No lesions.      Mouth: Mucous membranes are moist. No oral lesions.      Pharynx: No oropharyngeal exudate or posterior oropharyngeal erythema.   Eyes:      General: Lids are normal. Gaze aligned appropriately. No visual field deficit.     Extraocular Movements: Extraocular movements intact.      Conjunctiva/sclera: Conjunctivae normal.      Pupils: Pupils are equal.   Neck:      Thyroid: No thyroid mass or thyromegaly.      Vascular: No carotid bruit or JVD.      Trachea: Trachea normal. No tracheal deviation.   Cardiovascular:      Rate and Rhythm: Normal rate and regular rhythm.      Heart sounds: Normal heart sounds, S1 normal and S2 normal. No murmur heard.     No gallop. No S3 or S4 sounds.   Pulmonary:      Effort: Pulmonary effort is normal.      Breath sounds: Normal breath sounds.   Abdominal:      General: Bowel sounds are normal. There is no distension.    Musculoskeletal:      Cervical back: No rigidity.      Right lower leg: No edema.      Left lower leg: No edema.   Lymphadenopathy:      Head:      Right side of head: No submental, submandibular, tonsillar, preauricular, posterior auricular or occipital adenopathy.      Left side of head: No submental, submandibular, tonsillar, preauricular, posterior auricular or occipital adenopathy.      Cervical: No cervical adenopathy.      Upper Body:      Right upper body: No supraclavicular adenopathy.      Left upper body: No supraclavicular adenopathy.   Skin:     General: Skin is warm and dry.      Coloration: Skin is not cyanotic, jaundiced or pale.      Findings: No rash.      Nails: There is no clubbing.   Neurological:      Mental Status: He is alert and oriented to person, place, and time.      Cranial Nerves: No cranial nerve deficit, dysarthria or facial asymmetry.      Sensory: No sensory deficit.      Motor: No weakness, tremor or abnormal muscle tone.      Coordination: Coordination normal. Finger-Nose-Finger Test normal.      Gait: Gait normal.   Psychiatric:         Attention and Perception: Attention normal.         Mood and Affect: Mood normal.         Speech: Speech normal.         Behavior: Behavior normal.         Thought Content: Thought content normal.       Assessment & Plan   Problems Addressed this Visit          Allergies and Adverse Reactions    Chronic seasonal allergic rhinitis   Continue current medication  Encouraged to report if any worse or if any new symptoms or concerns.       Cardiac and Vasculature    Mixed hyperlipidemia  Encouraged to continue to work on his diet and exercise plan.    Shortness of breath on exertion  Encouraged to report if any worse or if any new symptoms or concerns.       Endocrine and Metabolic    Class 1 obesity with serious comorbidity and body mass index (BMI) of 32.0 to 32.9 in adult    Hypothyroidism due to Hashimoto's thyroiditis  Clinically  euthyroid.  Continue current medication.  Previously scheduled labs drawn    Prediabetes  Will continue to monitor       Gastrointestinal Abdominal     Gastroesophageal reflux disease without esophagitis   Symptoms are currently stable.  Encouraged to report if this should change.    Non-alcoholic fatty liver disease       Genitourinary and Reproductive     Benign prostatic hyperplasia with urinary frequency  Encouraged to report if any worse or if any new symptoms or concerns.    Erectile dysfunction       Health Encounters    Healthcare maintenance  Reminded that he is due for pneumococcal vaccination, Shingrix, and a colonoscopy.  He would like to proceed with the latter and arrangements will be made with Dr. Campbell       Musculoskeletal and Injuries    Right plantar fasciitis  Follow up with podiatry  Chronic pain of right ankle    Mechanical low back pain       Neuro    Migraine without aura and without status migrainosus, not intractable     Diagnoses         Codes Comments    Chronic seasonal allergic rhinitis    -  Primary ICD-10-CM: J30.2  ICD-9-CM: 477.8     Shortness of breath on exertion     ICD-10-CM: R06.02  ICD-9-CM: 786.05     Mixed hyperlipidemia     ICD-10-CM: E78.2  ICD-9-CM: 272.2     Prediabetes     ICD-10-CM: R73.03  ICD-9-CM: 790.29     Hypothyroidism due to Hashimoto's thyroiditis     ICD-10-CM: E03.8, E06.3  ICD-9-CM: 244.8, 245.2     Class 1 obesity with serious comorbidity and body mass index (BMI) of 32.0 to 32.9 in adult, unspecified obesity type     ICD-10-CM: E66.9, Z68.32  ICD-9-CM: 278.00, V85.32     Non-alcoholic fatty liver disease     ICD-10-CM: K76.0  ICD-9-CM: 571.8     Gastroesophageal reflux disease without esophagitis     ICD-10-CM: K21.9  ICD-9-CM: 530.81     Erectile dysfunction, unspecified erectile dysfunction type     ICD-10-CM: N52.9  ICD-9-CM: 607.84     Benign prostatic hyperplasia with urinary frequency     ICD-10-CM: N40.1, R35.0  ICD-9-CM: 600.01, 788.41      Healthcare maintenance     ICD-10-CM: Z00.00  ICD-9-CM: V70.0     Mechanical low back pain     ICD-10-CM: M54.59  ICD-9-CM: 724.2     Chronic pain of right ankle     ICD-10-CM: M25.571, G89.29  ICD-9-CM: 719.47, 338.29     Migraine without aura and without status migrainosus, not intractable     ICD-10-CM: G43.009  ICD-9-CM: 346.10     Encounter for screening for malignant neoplasm of prostate     ICD-10-CM: Z12.5  ICD-9-CM: V76.44

## 2024-04-27 VITALS
HEART RATE: 85 BPM | OXYGEN SATURATION: 94 % | DIASTOLIC BLOOD PRESSURE: 62 MMHG | WEIGHT: 238 LBS | BODY MASS INDEX: 32.23 KG/M2 | SYSTOLIC BLOOD PRESSURE: 118 MMHG | TEMPERATURE: 98.6 F | HEIGHT: 72 IN | RESPIRATION RATE: 14 BRPM

## 2024-04-27 LAB
ALBUMIN SERPL-MCNC: 4.3 G/DL (ref 3.5–5.2)
ALBUMIN/GLOB SERPL: 1.4 G/DL
ALP SERPL-CCNC: 84 U/L (ref 39–117)
ALT SERPL W P-5'-P-CCNC: 27 U/L (ref 1–41)
ANION GAP SERPL CALCULATED.3IONS-SCNC: 12.4 MMOL/L (ref 5–15)
AST SERPL-CCNC: 22 U/L (ref 1–40)
BASOPHILS # BLD AUTO: 0.04 10*3/MM3 (ref 0–0.2)
BASOPHILS NFR BLD AUTO: 0.6 % (ref 0–1.5)
BILIRUB SERPL-MCNC: 0.5 MG/DL (ref 0–1.2)
BUN SERPL-MCNC: 15 MG/DL (ref 6–20)
BUN/CREAT SERPL: 15.8 (ref 7–25)
CALCIUM SPEC-SCNC: 9.8 MG/DL (ref 8.6–10.5)
CHLORIDE SERPL-SCNC: 103 MMOL/L (ref 98–107)
CHOLEST SERPL-MCNC: 208 MG/DL (ref 0–200)
CO2 SERPL-SCNC: 23.6 MMOL/L (ref 22–29)
CREAT SERPL-MCNC: 0.95 MG/DL (ref 0.76–1.27)
DEPRECATED RDW RBC AUTO: 41.1 FL (ref 37–54)
EGFRCR SERPLBLD CKD-EPI 2021: 95.1 ML/MIN/1.73
EOSINOPHIL # BLD AUTO: 0.16 10*3/MM3 (ref 0–0.4)
EOSINOPHIL NFR BLD AUTO: 2.5 % (ref 0.3–6.2)
ERYTHROCYTE [DISTWIDTH] IN BLOOD BY AUTOMATED COUNT: 12.8 % (ref 12.3–15.4)
GLOBULIN UR ELPH-MCNC: 3.1 GM/DL
GLUCOSE SERPL-MCNC: 87 MG/DL (ref 65–99)
HBA1C MFR BLD: 6 % (ref 4.8–5.6)
HCT VFR BLD AUTO: 45.9 % (ref 37.5–51)
HDLC SERPL-MCNC: 36 MG/DL (ref 40–60)
HGB BLD-MCNC: 15.2 G/DL (ref 13–17.7)
IMM GRANULOCYTES # BLD AUTO: 0.02 10*3/MM3 (ref 0–0.05)
IMM GRANULOCYTES NFR BLD AUTO: 0.3 % (ref 0–0.5)
LDLC SERPL CALC-MCNC: 150 MG/DL (ref 0–100)
LDLC/HDLC SERPL: 4.12 {RATIO}
LYMPHOCYTES # BLD AUTO: 2.5 10*3/MM3 (ref 0.7–3.1)
LYMPHOCYTES NFR BLD AUTO: 39.6 % (ref 19.6–45.3)
MCH RBC QN AUTO: 29.1 PG (ref 26.6–33)
MCHC RBC AUTO-ENTMCNC: 33.1 G/DL (ref 31.5–35.7)
MCV RBC AUTO: 87.9 FL (ref 79–97)
MONOCYTES # BLD AUTO: 0.44 10*3/MM3 (ref 0.1–0.9)
MONOCYTES NFR BLD AUTO: 7 % (ref 5–12)
NEUTROPHILS NFR BLD AUTO: 3.16 10*3/MM3 (ref 1.7–7)
NEUTROPHILS NFR BLD AUTO: 50 % (ref 42.7–76)
NRBC BLD AUTO-RTO: 0 /100 WBC (ref 0–0.2)
PLATELET # BLD AUTO: 246 10*3/MM3 (ref 140–450)
PMV BLD AUTO: 10.8 FL (ref 6–12)
POTASSIUM SERPL-SCNC: 4.7 MMOL/L (ref 3.5–5.2)
PROT SERPL-MCNC: 7.4 G/DL (ref 6–8.5)
PSA SERPL-MCNC: 0.73 NG/ML (ref 0–4)
RBC # BLD AUTO: 5.22 10*6/MM3 (ref 4.14–5.8)
SODIUM SERPL-SCNC: 139 MMOL/L (ref 136–145)
TRIGL SERPL-MCNC: 119 MG/DL (ref 0–150)
TSH SERPL DL<=0.05 MIU/L-ACNC: 2.46 UIU/ML (ref 0.27–4.2)
VLDLC SERPL-MCNC: 22 MG/DL (ref 5–40)
WBC NRBC COR # BLD AUTO: 6.32 10*3/MM3 (ref 3.4–10.8)

## 2024-06-21 ENCOUNTER — TELEPHONE (OUTPATIENT)
Dept: FAMILY MEDICINE CLINIC | Facility: CLINIC | Age: 54
End: 2024-06-21

## 2024-06-21 NOTE — TELEPHONE ENCOUNTER
Caller: Fawad Morton    Relationship: Self    Best call back number: [phone]    What is the medical concern/diagnosis:     LEFT KNEE, LEG IS LOCKING UP    What specialty or service is being requested:     ORTHO?    Any additional details:     NO APPOINTMENTS WITH [facility] UNTIL AUGUST

## 2024-06-22 DIAGNOSIS — M25.562 ACUTE PAIN OF LEFT KNEE: Primary | ICD-10-CM

## 2024-06-25 ENCOUNTER — HOSPITAL ENCOUNTER (OUTPATIENT)
Dept: GENERAL RADIOLOGY | Facility: HOSPITAL | Age: 54
Discharge: HOME | End: 2024-06-25
Admitting: GENERAL PRACTICE
Payer: COMMERCIAL

## 2024-06-25 DIAGNOSIS — M25.562 ACUTE PAIN OF LEFT KNEE: ICD-10-CM

## 2024-06-25 PROCEDURE — 73562 X-RAY EXAM OF KNEE 3: CPT

## 2024-06-25 NOTE — PROGRESS NOTES
Spoke with pt about the following per Dr. Bowie. VH    -- Please let patient know that XR knee normal I have placed a referral to ortho He should let us know if he isnt advised of an appt by next week

## 2024-07-03 ENCOUNTER — OFFICE VISIT (OUTPATIENT)
Dept: FAMILY MEDICINE CLINIC | Facility: CLINIC | Age: 54
End: 2024-07-03
Payer: COMMERCIAL

## 2024-07-03 VITALS
WEIGHT: 244 LBS | HEART RATE: 74 BPM | TEMPERATURE: 97.5 F | HEIGHT: 72 IN | BODY MASS INDEX: 33.05 KG/M2 | SYSTOLIC BLOOD PRESSURE: 130 MMHG | OXYGEN SATURATION: 95 % | DIASTOLIC BLOOD PRESSURE: 82 MMHG

## 2024-07-03 DIAGNOSIS — L02.91 ABSCESS: Primary | ICD-10-CM

## 2024-07-03 PROCEDURE — 99213 OFFICE O/P EST LOW 20 MIN: CPT | Performed by: PHYSICIAN ASSISTANT

## 2024-07-03 RX ORDER — LATANOPROST 50 UG/ML
1 SOLUTION/ DROPS OPHTHALMIC DAILY
COMMUNITY
Start: 2024-05-17

## 2024-07-03 RX ORDER — DOXYCYCLINE HYCLATE 100 MG/1
100 CAPSULE ORAL 2 TIMES DAILY
Qty: 20 CAPSULE | Refills: 0 | Status: SHIPPED | OUTPATIENT
Start: 2024-07-03

## 2024-07-03 RX ORDER — VALACYCLOVIR HYDROCHLORIDE 1 G/1
1000 TABLET, FILM COATED ORAL 2 TIMES DAILY
Qty: 12 TABLET | Refills: 0 | Status: SHIPPED | OUTPATIENT
Start: 2024-07-03

## 2024-07-03 NOTE — PROGRESS NOTES
Subjective        Chief Complaint  Insect Bite    Subjective      Fawad Morton is a 54 y.o. male who presents today to [facility] for Insect Bite. He has a past medical history of GERD, Hyperlipidemia, Hypothyroidism, Mechanical low back pain, Migraine, Non-alcoholic fatty liver disease, Prediabetes.    Insect Bite:  Fawad reports that he works for the [employer] and is often outside in the heat.  He has noted several areas of suspected insect bites recently that have had a hard time healing.  He specifically notes an area in his left groin which she believes was abscessed a few days ago.  He cleaned a pen needle with alcohol and poked the area and was able to express some discharge. He reports significant improvement in the pain, swelling, and redness since then. He wanted to get the area evaluated to see if he needs antibiotics.      Current Outpatient Medications:     Glucose Blood (Blood Glucose Test) strip, Apply 1 each topically to the appropriate area as directed Daily As Needed (glucose check). 1 daily, Disp: 30 each, Rfl: 5    Lancets misc, Apply 1 each topically to the appropriate area as directed Daily As Needed (glucose check). 1 daily, Disp: 30 each, Rfl: 5    latanoprost (XALATAN) 0.005 % ophthalmic solution, Administer 1 drop to both eyes Daily., Disp: , Rfl:     Pseudoephedrine HCl  MG tablet sustained-release 24 hour, Take 1 tablet by mouth Daily As Needed (nasal congestion)., Disp: 30 each, Rfl: 5    Synthroid 88 MCG tablet, Take 1 tablet by mouth Daily., Disp: 90 tablet, Rfl: 3    valACYclovir (Valtrex) 1000 MG tablet, Take 1 tablet by mouth 2 (Two) Times a Day., Disp: 12 tablet, Rfl: 0    Diclofenac Sodium (VOLTAREN) 1 % gel gel, Apply 4 g topically to the appropriate area as directed 2 (Two) Times a Day., Disp: , Rfl:     doxycycline (VIBRAMYCIN) 100 MG capsule, Take 1 capsule by mouth 2 (Two) Times a Day., Disp: 20 capsule, Rfl: 0     "mupirocin (BACTROBAN) 2 % ointment, Apply 1 Application topically to the appropriate area as directed 3 (Three) Times a Day., Disp: 30 g, Rfl: 1      Allergies   Allergen Reactions    Penicillins      Objective     Objective   Vital Signs:  /82   Pulse 74   Temp 97.5 °F (36.4 °C) (Temporal)   Ht 182.9 cm (72.01\")   Wt 111 kg (244 lb)   SpO2 95%   BMI 33.08 kg/m²   Estimated body mass index is 33.08 kg/m² as calculated from the following:    Height as of this encounter: 182.9 cm (72.01\").    Weight as of this encounter: 111 kg (244 lb).    Past Medical History:   Diagnosis Date    Allergic sinusitis     GERD (gastroesophageal reflux disease)     Hyperlipidemia     Hypogonadism, male     Hypothyroidism     Mechanical low back pain     Migraine     Non-alcoholic fatty liver disease     Prediabetes     Situational anxiety     Skin tag      History reviewed. No pertinent surgical history.  Social History     Socioeconomic History    Marital status:      Spouse name: andriy    Number of children: 4    Years of education: 18   Tobacco Use    Smoking status: Never     Passive exposure: Never    Smokeless tobacco: Never   Vaping Use    Vaping status: Never Used   Substance and Sexual Activity    Alcohol use: Yes     Alcohol/week: 1.0 standard drink of alcohol     Types: 1 Cans of beer per week    Drug use: No    Sexual activity: Defer      Physical Exam  Vitals and nursing note reviewed.   Constitutional:       General: He is not in acute distress.     Appearance: He is well-developed. He is not diaphoretic.   HENT:      Head: Normocephalic and atraumatic.   Eyes:      General: No scleral icterus.        Right eye: No discharge.         Left eye: No discharge.      Conjunctiva/sclera: Conjunctivae normal.   Pulmonary:      Effort: Pulmonary effort is normal. No respiratory distress.   Musculoskeletal:         General: Normal range of motion.      Cervical back: Normal range of motion and neck supple. "   Skin:     General: Skin is warm and dry.      Coloration: Skin is not pale.      Findings: No erythema or rash.      Comments: 1-2cm area of induration with open ulcer in the left groin. No significant surrounding erythema or warmth.    Neurological:      Mental Status: He is alert and oriented to person, place, and time.   Psychiatric:         Behavior: Behavior normal.        Result Review :  The following data was reviewed by: CRISTIAN Delacruz on 07/03/2024:  Hemoglobin A1C   Date Value Ref Range Status   04/26/2024 6.00 (H) 4.80 - 5.60 % Final     TSH   Date Value Ref Range Status   04/26/2024 2.460 0.270 - 4.200 uIU/mL Final     HDL Cholesterol   Date Value Ref Range Status   04/26/2024 36 (L) 40 - 60 mg/dL Final     LDL Cholesterol    Date Value Ref Range Status   04/26/2024 150 (H) 0 - 100 mg/dL Final     Triglycerides   Date Value Ref Range Status   04/26/2024 119 0 - 150 mg/dL Final     Total Cholesterol   Date Value Ref Range Status   01/27/2016 195 0 - 200 mg/dL Final     Comment:       Cholesterol Reference Range:      Desirable                 < 200mg/dl      Borderline High        200-239mg/dl      High Risk                 > 239mg/dl             Assessment / Plan         Assessment   Diagnoses and all orders for this visit:    1. Abscess (Primary)  We discussed option of I/D of the area given some continued induration possibly indicating persistent abscess. He reports that he has poked and squeezed the area as hard as he can stand without any further drainage present. He would like to avoid I/D if possible and reports the area is significantly improved compared to a few days ago.   Given improvement noted with at home drainage, will do a trial of oral and topical antibiotics and if the area fails to resolve or worsening noted, recommend returning for I/D.  Add doxycycline 100mg BID x 10 days.   Add topical mupirocin ointment TID x 10 days.   Apply warm compresses 2-3 times daily for 15-20mins.    Hibiclens isn't covered by his insurance. Recommend cleaning with antibacterial soap regularly given reported history of recurrent folliculitis.   Return to clinic if no improvement noted or if symptoms are worsening.  -     mupirocin (BACTROBAN) 2 % ointment; Apply 1 Application topically to the appropriate area as directed 3 (Three) Times a Day.  Dispense: 30 g; Refill: 1  -     doxycycline (VIBRAMYCIN) 100 MG capsule; Take 1 capsule by mouth 2 (Two) Times a Day.  Dispense: 20 capsule; Refill: 0       New Medications Ordered This Visit   Medications    mupirocin (BACTROBAN) 2 % ointment     Sig: Apply 1 Application topically to the appropriate area as directed 3 (Three) Times a Day.     Dispense:  30 g     Refill:  1    doxycycline (VIBRAMYCIN) 100 MG capsule     Sig: Take 1 capsule by mouth 2 (Two) Times a Day.     Dispense:  20 capsule     Refill:  0    valACYclovir (Valtrex) 1000 MG tablet     Sig: Take 1 tablet by mouth 2 (Two) Times a Day.     Dispense:  12 tablet     Refill:  0     Follow Up   Return if symptoms worsen or fail to improve.    Patient was given instructions and counseling regarding his condition or for health maintenance advice. Please see specific information pulled into the AVS if appropriate.       This document has been electronically signed by CRISTIAN Delacruz   July 3, 2024 11:43 EDT    Dictated Utilizing Dragon Dictation: Part of this note may be an electronic transcription/translation of spoken language to printed text using the Dragon Dictation System.

## 2024-07-16 ENCOUNTER — OFFICE VISIT (OUTPATIENT)
Dept: ORTHOPEDIC SURGERY | Facility: CLINIC | Age: 54
End: 2024-07-16
Payer: COMMERCIAL

## 2024-07-16 VITALS
SYSTOLIC BLOOD PRESSURE: 154 MMHG | HEIGHT: 72 IN | DIASTOLIC BLOOD PRESSURE: 100 MMHG | BODY MASS INDEX: 33.15 KG/M2 | WEIGHT: 244.71 LBS

## 2024-07-16 DIAGNOSIS — M17.12 PRIMARY OSTEOARTHRITIS OF LEFT KNEE: ICD-10-CM

## 2024-07-16 DIAGNOSIS — M25.562 LATERAL KNEE PAIN, LEFT: Primary | ICD-10-CM

## 2024-07-16 PROCEDURE — 99204 OFFICE O/P NEW MOD 45 MIN: CPT | Performed by: ORTHOPAEDIC SURGERY

## 2024-07-16 NOTE — PROGRESS NOTES
Orthopaedic Clinic Note: Knee New Patient    Chief Complaint   Patient presents with    Left Knee - Pain        HPI    Fawad Morton is a 54 y.o. male who presents with left knee pain for 3 month(s). Onset atraumatic and gradual in nature. Pain is localized to the lateral joint line and is a 1/10 on the pain scale. Pain is described as dull. Associated symptoms include pain and locking up . The pain is worse with  squatting ; resting make it better. Previous treatments have included: NSAIDS since symptom onset. Although some transient relief was reported with these interventions, these conservative measures have failed and symptoms have persisted. The patient is limited in daily activities and has had a significant decrease in quality of life as a result. He denies fevers, chills, or constitutional symptoms.    I have reviewed the following portions of the patient's history:History of Present Illness    Past Medical History:   Diagnosis Date    Allergic sinusitis     GERD (gastroesophageal reflux disease)     Hyperlipidemia     Hypogonadism, male     Hypothyroidism     Mechanical low back pain     Migraine     Non-alcoholic fatty liver disease     Prediabetes     Situational anxiety     Skin tag       History reviewed. No pertinent surgical history.   Family History   Problem Relation Age of Onset    No Known Problems Mother      Social History     Socioeconomic History    Marital status:      Spouse name: andriy    Number of children: 4    Years of education: 18   Tobacco Use    Smoking status: Never     Passive exposure: Never    Smokeless tobacco: Never   Vaping Use    Vaping status: Never Used   Substance and Sexual Activity    Alcohol use: Yes     Alcohol/week: 1.0 standard drink of alcohol     Types: 1 Cans of beer per week    Drug use: No    Sexual activity: Defer      Current Outpatient Medications on File Prior to Visit   Medication Sig Dispense Refill    Diclofenac Sodium (VOLTAREN) 1 % gel gel  "Apply 4 g topically to the appropriate area as directed 2 (Two) Times a Day.      doxycycline (VIBRAMYCIN) 100 MG capsule Take 1 capsule by mouth 2 (Two) Times a Day. 20 capsule 0    Glucose Blood (Blood Glucose Test) strip Apply 1 each topically to the appropriate area as directed Daily As Needed (glucose check). 1 daily 30 each 5    Lancets misc Apply 1 each topically to the appropriate area as directed Daily As Needed (glucose check). 1 daily 30 each 5    latanoprost (XALATAN) 0.005 % ophthalmic solution Administer 1 drop to both eyes Daily.      mupirocin (BACTROBAN) 2 % ointment Apply 1 Application topically to the appropriate area as directed 3 (Three) Times a Day. 30 g 1    Pseudoephedrine HCl  MG tablet sustained-release 24 hour Take 1 tablet by mouth Daily As Needed (nasal congestion). 30 each 5    Synthroid 88 MCG tablet Take 1 tablet by mouth Daily. 90 tablet 3    valACYclovir (Valtrex) 1000 MG tablet Take 1 tablet by mouth 2 (Two) Times a Day. 12 tablet 0     No current facility-administered medications on file prior to visit.      Allergies   Allergen Reactions    Penicillins         Review of Systems   Constitutional: Negative.    HENT: Negative.     Eyes: Negative.    Respiratory: Negative.     Cardiovascular: Negative.    Gastrointestinal: Negative.    Endocrine: Negative.    Genitourinary: Negative.    Musculoskeletal:  Positive for arthralgias.   Skin: Negative.    Allergic/Immunologic: Negative.    Neurological: Negative.    Hematological: Negative.    Psychiatric/Behavioral: Negative.          The patient's Review of Systems was personally reviewed and confirmed as accurate.    The following portions of the patient's history were reviewed and updated as appropriate: allergies, current medications, past family history, past medical history, past social history, past surgical history, and problem list.    Physical Exam  Blood pressure 154/100, height 182.9 cm (72.01\"), weight 111 kg (244 lb " 11.4 oz).    Body mass index is 33.18 kg/m².    GENERAL APPEARANCE: awake, alert & oriented x 3, in no acute distress and well developed, well nourished  PSYCH: normal affect  LUNGS:  breathing nonlabored  EYES: sclera anicteric  CARDIOVASCULAR: palpable dorsalis pedis, palpable posterior tibial bilaterally. Capillary refill less than 2 seconds  EXTREMITIES: no clubbing, cyanosis  GAIT: Normal      Left Lower Extremity Exam:   ----------  Hip Exam  ----------  FLEXION CONTRACTURE: None  FLEXION: 110 degrees  INTERNAL ROTATION: 20 degrees at 90 degrees of flexion   EXTERNAL ROTATION: 40 degrees at 90 degrees of flexion    PAIN WITH HIP MOTION: no  ----------  Knee Exam  ----------  ALIGNMENT: neutral    RANGE OF MOTION:  Normal (0-120 degrees) with no extensor lag or flexion contracture  LIGAMENTOUS STABILITY:   stable to varus and valgus stress at terminal extension and 30 degrees without any evidence of laxity     STRENGTH:  5/5 knee flexion, extension. 5/5 ankle dorsiflexion and plantarflexion.     PAIN WITH PALPATION: lateral joint line  KNEE EFFUSION: no  PAIN WITH KNEE ROM: yes, lateral  PATELLAR CREPITUS: no  SPECIAL EXAM FINDINGS:   Painful lateral Otilio's, negative medial nares    REFLEXES:  PATELLAR 2+/4  ACHILLES 2+/4    CLONUS: no  STRAIGHT LEG TEST:   negative    SENSATION TO LIGHT TOUCH:  DEEP PERONEAL/SUPERFICIAL PERONEAL/SURAL/SAPHENOUS/TIBIAL:   intact    EDEMA:  no  ERYTHEMA:  no  WOUNDS/INCISIONS:  no    ______________________________________________________________________  ______________________________________________________________________    RADIOGRAPHIC FINDINGS:   Left knee radiographs from 6/25/2024 personally reviewed and interpreted.  Radiographs demonstrate mild to moderate arthritic changes tricompartmentally with slight varus alignment.  No acute bony injury or fracture.      Labs from 4/26/2024 reveal A1c of 6.0, creatinine 0.95, GFR 95.1.    Assessment/Plan:   Diagnosis Plan   1.  "Lateral knee pain, left  MRI Knee Left Without Contrast      2. Primary osteoarthritis of left knee          Patient's exam is concerning for lateral meniscus tear resulting in a mechanical block to knee motion.  He does have a palpable click that occurs when the knee gets \"stuck\" 30 degrees shy of extension that results in release of the constraint and return to full extension.  As a result, I am concerned about a lateral meniscus tear and/or bucket-handle meniscus tear that is generating the symptoms.  I recommend an MRI of the left knee to assess for this.  He also has some mild to moderate arthritic changes of the knee that could be contributory versus tendon subluxation that could be generating this problem.  He is agreeable to the MRI to assess for internal derangement.  I will see him back after the MRI to discuss results and how to proceed.    Bryn Ortega MD  07/16/24  11:01 EDT        "

## 2024-07-25 ENCOUNTER — HOSPITAL ENCOUNTER (OUTPATIENT)
Facility: HOSPITAL | Age: 54
Discharge: HOME | End: 2024-07-25
Admitting: ORTHOPAEDIC SURGERY
Payer: COMMERCIAL

## 2024-07-25 DIAGNOSIS — M25.562 LATERAL KNEE PAIN, LEFT: ICD-10-CM

## 2024-07-25 PROCEDURE — 73721 MRI JNT OF LWR EXTRE W/O DYE: CPT

## 2024-08-02 ENCOUNTER — OFFICE VISIT (OUTPATIENT)
Dept: ORTHOPEDIC SURGERY | Facility: CLINIC | Age: 54
End: 2024-08-02
Payer: COMMERCIAL

## 2024-08-02 VITALS
SYSTOLIC BLOOD PRESSURE: 136 MMHG | WEIGHT: 244.71 LBS | HEIGHT: 72 IN | DIASTOLIC BLOOD PRESSURE: 78 MMHG | BODY MASS INDEX: 33.15 KG/M2

## 2024-08-02 DIAGNOSIS — M17.12 PRIMARY OSTEOARTHRITIS OF LEFT KNEE: ICD-10-CM

## 2024-08-02 DIAGNOSIS — S83.272A COMPLEX TEAR OF LATERAL MENISCUS OF LEFT KNEE AS CURRENT INJURY, INITIAL ENCOUNTER: Primary | ICD-10-CM

## 2024-08-02 NOTE — PROGRESS NOTES
Orthopaedic Clinic Note: Knee Established Patient    Chief Complaint   Patient presents with    Follow-up     2 WEEK FOLLOW UP --Lateral knee pain, left         HPI    It has been 2  week(s) since Mr. Morton's last visit. He returns to clinic today for follow-up along lateral based left knee pain.   he is here to discuss the MRI of the left knee that was performed on 7/25/2024.  He continues to have occasional lateral based pain with popping localized to the posterior lateral joint line.  Currently his pain is a 0/10 on the pain scale.  Overall he is doing the same.    Past Medical History:   Diagnosis Date    Allergic sinusitis     GERD (gastroesophageal reflux disease)     Hyperlipidemia     Hypogonadism, male     Hypothyroidism     Mechanical low back pain     Migraine     Non-alcoholic fatty liver disease     Prediabetes     Situational anxiety     Skin tag       History reviewed. No pertinent surgical history.   Family History   Problem Relation Age of Onset    No Known Problems Mother      Social History     Socioeconomic History    Marital status:      Spouse name: andriy    Number of children: 4    Years of education: 18   Tobacco Use    Smoking status: Never     Passive exposure: Never    Smokeless tobacco: Never   Vaping Use    Vaping status: Never Used   Substance and Sexual Activity    Alcohol use: Yes     Alcohol/week: 1.0 standard drink of alcohol     Types: 1 Cans of beer per week    Drug use: No    Sexual activity: Defer      Current Outpatient Medications on File Prior to Visit   Medication Sig Dispense Refill    Diclofenac Sodium (VOLTAREN) 1 % gel gel Apply 4 g topically to the appropriate area as directed 2 (Two) Times a Day.      doxycycline (VIBRAMYCIN) 100 MG capsule Take 1 capsule by mouth 2 (Two) Times a Day. 20 capsule 0    Glucose Blood (Blood Glucose Test) strip Apply 1 each topically to the appropriate area as directed Daily As Needed (glucose check). 1 daily 30 each 5    Lancets  "misc Apply 1 each topically to the appropriate area as directed Daily As Needed (glucose check). 1 daily 30 each 5    latanoprost (XALATAN) 0.005 % ophthalmic solution Administer 1 drop to both eyes Daily.      mupirocin (BACTROBAN) 2 % ointment Apply 1 Application topically to the appropriate area as directed 3 (Three) Times a Day. 30 g 1    Pseudoephedrine HCl  MG tablet sustained-release 24 hour Take 1 tablet by mouth Daily As Needed (nasal congestion). 30 each 5    Synthroid 88 MCG tablet Take 1 tablet by mouth Daily. 90 tablet 3    valACYclovir (Valtrex) 1000 MG tablet Take 1 tablet by mouth 2 (Two) Times a Day. 12 tablet 0     No current facility-administered medications on file prior to visit.      Allergies   Allergen Reactions    Penicillins         Review of Systems   Constitutional: Negative.    HENT: Negative.     Eyes: Negative.    Respiratory: Negative.     Cardiovascular: Negative.    Gastrointestinal: Negative.    Endocrine: Negative.    Genitourinary: Negative.    Musculoskeletal:  Positive for arthralgias.   Skin: Negative.    Allergic/Immunologic: Negative.    Neurological: Negative.    Hematological: Negative.    Psychiatric/Behavioral: Negative.          The patient's Review of Systems was personally reviewed and confirmed as accurate.    Physical Exam  Blood pressure 136/78, height 182.9 cm (72\"), weight 111 kg (244 lb 11.4 oz).    Body mass index is 33.19 kg/m².    GENERAL APPEARANCE: awake, alert, oriented, in no acute distress and well developed, well nourished  LUNGS:  breathing nonlabored  EXTREMITIES: no clubbing, cyanosis  PERIPHERAL PULSES: palpable dorsalis pedis and posterior tibial pulses bilaterally.    GAIT:  Normal        ----------  Left Knee Exam:  ----------  ALIGNMENT: neutral    RANGE OF MOTION:  Normal (0-120 degrees) with no extensor lag or flexion contracture  LIGAMENTOUS STABILITY:   stable to varus and valgus stress at terminal extension and 30 degrees without any " evidence of laxity     STRENGTH:  5/5 knee flexion, extension. 5/5 ankle dorsiflexion and plantarflexion.     PAIN WITH PALPATION: lateral joint line  KNEE EFFUSION: no  PAIN WITH KNEE ROM: yes, lateral  PATELLAR CREPITUS: no  SPECIAL EXAM FINDINGS:   Painful lateral Otilio's, negative medial nares    SENSATION TO LIGHT TOUCH:  DEEP PERONEAL/SUPERFICIAL PERONEAL/SURAL/SAPHENOUS/TIBIAL:   intact     EDEMA:  no  ERYTHEMA:  no  WOUNDS/INCISIONS:  no  _____________________________________________________________________  _____________________________________________________________________    RADIOGRAPHIC FINDINGS:   MRI of the left knee from 7/25/2024 was personally reviewed and interpreted.  MRI demonstrates complex tear of the anterior horn and body of the lateral meniscus with mild degenerative changes tricompartmentally.    Assessment/Plan:   Diagnosis Plan   1. Complex tear of lateral meniscus of left knee as current injury, initial encounter        2. Primary osteoarthritis of left knee          I reviewed the MRI findings with the patient.  His symptoms are localized to the lateral knee but are more posterior based.  I cannot guarantee that the meniscus tear identified on MRI is the source of his ongoing pain.  I discussed knee arthroscopy for partial meniscectomy as a solution for the meniscus tear.  Explained that this may alleviate his ongoing mechanical symptoms.  After discussion of the risks and benefits of surgical intervention, the patient declined surgery at this time and wishes to continue with conservative treatment including anti-inflammatories and compression knee sleeve.  If his symptoms persist or worsen or if he decides to proceed to partial meniscectomy, he will call for follow-up appointment.    Bryn Ortega MD  08/02/24  12:20 EDT

## 2024-10-24 ENCOUNTER — LAB (OUTPATIENT)
Dept: FAMILY MEDICINE CLINIC | Facility: CLINIC | Age: 54
End: 2024-10-24
Payer: COMMERCIAL

## 2024-10-24 DIAGNOSIS — E66.811 CLASS 1 OBESITY WITH SERIOUS COMORBIDITY AND BODY MASS INDEX (BMI) OF 32.0 TO 32.9 IN ADULT, UNSPECIFIED OBESITY TYPE: ICD-10-CM

## 2024-10-24 DIAGNOSIS — M25.571 CHRONIC PAIN OF RIGHT ANKLE: ICD-10-CM

## 2024-10-24 DIAGNOSIS — M25.562 ACUTE PAIN OF LEFT KNEE: Primary | ICD-10-CM

## 2024-10-24 DIAGNOSIS — R35.0 BENIGN PROSTATIC HYPERPLASIA WITH URINARY FREQUENCY: ICD-10-CM

## 2024-10-24 DIAGNOSIS — N40.1 BENIGN PROSTATIC HYPERPLASIA WITH URINARY FREQUENCY: ICD-10-CM

## 2024-10-24 DIAGNOSIS — R06.02 SHORTNESS OF BREATH ON EXERTION: ICD-10-CM

## 2024-10-24 DIAGNOSIS — E06.3 HYPOTHYROIDISM DUE TO HASHIMOTO'S THYROIDITIS: ICD-10-CM

## 2024-10-24 DIAGNOSIS — E78.2 MIXED HYPERLIPIDEMIA: ICD-10-CM

## 2024-10-24 DIAGNOSIS — K21.9 GASTROESOPHAGEAL REFLUX DISEASE WITHOUT ESOPHAGITIS: ICD-10-CM

## 2024-10-24 DIAGNOSIS — K76.0 NON-ALCOHOLIC FATTY LIVER DISEASE: ICD-10-CM

## 2024-10-24 DIAGNOSIS — R73.03 PREDIABETES: ICD-10-CM

## 2024-10-24 DIAGNOSIS — G89.29 CHRONIC PAIN OF RIGHT ANKLE: ICD-10-CM

## 2024-10-24 DIAGNOSIS — J30.2 CHRONIC SEASONAL ALLERGIC RHINITIS: ICD-10-CM

## 2024-10-24 LAB
ALBUMIN SERPL-MCNC: 4.1 G/DL (ref 3.5–5.2)
ALBUMIN/GLOB SERPL: 1.4 G/DL
ALP SERPL-CCNC: 75 U/L (ref 39–117)
ALT SERPL W P-5'-P-CCNC: 40 U/L (ref 1–41)
ANION GAP SERPL CALCULATED.3IONS-SCNC: 7.4 MMOL/L (ref 5–15)
AST SERPL-CCNC: 21 U/L (ref 1–40)
BASOPHILS # BLD AUTO: 0.04 10*3/MM3 (ref 0–0.2)
BASOPHILS NFR BLD AUTO: 0.6 % (ref 0–1.5)
BILIRUB SERPL-MCNC: 0.6 MG/DL (ref 0–1.2)
BUN SERPL-MCNC: 15 MG/DL (ref 6–20)
BUN/CREAT SERPL: 13.9 (ref 7–25)
CALCIUM SPEC-SCNC: 9.4 MG/DL (ref 8.6–10.5)
CHLORIDE SERPL-SCNC: 104 MMOL/L (ref 98–107)
CHOLEST SERPL-MCNC: 202 MG/DL (ref 0–200)
CO2 SERPL-SCNC: 27.6 MMOL/L (ref 22–29)
CREAT SERPL-MCNC: 1.08 MG/DL (ref 0.76–1.27)
DEPRECATED RDW RBC AUTO: 40.1 FL (ref 37–54)
EGFRCR SERPLBLD CKD-EPI 2021: 81.5 ML/MIN/1.73
EOSINOPHIL # BLD AUTO: 0.18 10*3/MM3 (ref 0–0.4)
EOSINOPHIL NFR BLD AUTO: 2.9 % (ref 0.3–6.2)
ERYTHROCYTE [DISTWIDTH] IN BLOOD BY AUTOMATED COUNT: 12.4 % (ref 12.3–15.4)
GLOBULIN UR ELPH-MCNC: 2.9 GM/DL
GLUCOSE SERPL-MCNC: 105 MG/DL (ref 65–99)
HBA1C MFR BLD: 6 % (ref 4.8–5.6)
HCT VFR BLD AUTO: 45.1 % (ref 37.5–51)
HDLC SERPL-MCNC: 40 MG/DL (ref 40–60)
HGB BLD-MCNC: 15 G/DL (ref 13–17.7)
IMM GRANULOCYTES # BLD AUTO: 0.02 10*3/MM3 (ref 0–0.05)
IMM GRANULOCYTES NFR BLD AUTO: 0.3 % (ref 0–0.5)
LDLC SERPL CALC-MCNC: 143 MG/DL (ref 0–100)
LDLC/HDLC SERPL: 3.53 {RATIO}
LYMPHOCYTES # BLD AUTO: 2.17 10*3/MM3 (ref 0.7–3.1)
LYMPHOCYTES NFR BLD AUTO: 34.8 % (ref 19.6–45.3)
MCH RBC QN AUTO: 29.5 PG (ref 26.6–33)
MCHC RBC AUTO-ENTMCNC: 33.3 G/DL (ref 31.5–35.7)
MCV RBC AUTO: 88.6 FL (ref 79–97)
MONOCYTES # BLD AUTO: 0.58 10*3/MM3 (ref 0.1–0.9)
MONOCYTES NFR BLD AUTO: 9.3 % (ref 5–12)
NEUTROPHILS NFR BLD AUTO: 3.24 10*3/MM3 (ref 1.7–7)
NEUTROPHILS NFR BLD AUTO: 52.1 % (ref 42.7–76)
NRBC BLD AUTO-RTO: 0 /100 WBC (ref 0–0.2)
PLATELET # BLD AUTO: 261 10*3/MM3 (ref 140–450)
PMV BLD AUTO: 11 FL (ref 6–12)
POTASSIUM SERPL-SCNC: 4 MMOL/L (ref 3.5–5.2)
PROT SERPL-MCNC: 7 G/DL (ref 6–8.5)
RBC # BLD AUTO: 5.09 10*6/MM3 (ref 4.14–5.8)
SODIUM SERPL-SCNC: 139 MMOL/L (ref 136–145)
TRIGL SERPL-MCNC: 105 MG/DL (ref 0–150)
TSH SERPL DL<=0.05 MIU/L-ACNC: 2.05 UIU/ML (ref 0.27–4.2)
VLDLC SERPL-MCNC: 19 MG/DL (ref 5–40)
WBC NRBC COR # BLD AUTO: 6.23 10*3/MM3 (ref 3.4–10.8)

## 2024-10-24 PROCEDURE — 80050 GENERAL HEALTH PANEL: CPT | Performed by: GENERAL PRACTICE

## 2024-10-24 PROCEDURE — 80061 LIPID PANEL: CPT | Performed by: GENERAL PRACTICE

## 2024-10-24 PROCEDURE — 83036 HEMOGLOBIN GLYCOSYLATED A1C: CPT | Performed by: GENERAL PRACTICE

## 2024-10-24 PROCEDURE — 36415 COLL VENOUS BLD VENIPUNCTURE: CPT

## 2024-10-25 ENCOUNTER — OFFICE VISIT (OUTPATIENT)
Dept: FAMILY MEDICINE CLINIC | Facility: CLINIC | Age: 54
End: 2024-10-25
Payer: COMMERCIAL

## 2024-10-25 VITALS
HEIGHT: 72 IN | RESPIRATION RATE: 14 BRPM | SYSTOLIC BLOOD PRESSURE: 122 MMHG | OXYGEN SATURATION: 95 % | BODY MASS INDEX: 32.78 KG/M2 | HEART RATE: 87 BPM | WEIGHT: 242 LBS | TEMPERATURE: 97.8 F | DIASTOLIC BLOOD PRESSURE: 62 MMHG

## 2024-10-25 DIAGNOSIS — E06.3 HYPOTHYROIDISM DUE TO HASHIMOTO'S THYROIDITIS: ICD-10-CM

## 2024-10-25 DIAGNOSIS — K76.0 NON-ALCOHOLIC FATTY LIVER DISEASE: ICD-10-CM

## 2024-10-25 DIAGNOSIS — M54.59 MECHANICAL LOW BACK PAIN: ICD-10-CM

## 2024-10-25 DIAGNOSIS — G89.29 CHRONIC PAIN OF RIGHT ANKLE: ICD-10-CM

## 2024-10-25 DIAGNOSIS — N52.9 ERECTILE DYSFUNCTION, UNSPECIFIED ERECTILE DYSFUNCTION TYPE: ICD-10-CM

## 2024-10-25 DIAGNOSIS — K21.9 GASTROESOPHAGEAL REFLUX DISEASE WITHOUT ESOPHAGITIS: ICD-10-CM

## 2024-10-25 DIAGNOSIS — M25.562 CHRONIC PAIN OF LEFT KNEE: ICD-10-CM

## 2024-10-25 DIAGNOSIS — G89.29 CHRONIC PAIN OF LEFT KNEE: ICD-10-CM

## 2024-10-25 DIAGNOSIS — R73.03 PREDIABETES: ICD-10-CM

## 2024-10-25 DIAGNOSIS — Z00.00 HEALTHCARE MAINTENANCE: ICD-10-CM

## 2024-10-25 DIAGNOSIS — E66.811 CLASS 1 OBESITY WITH SERIOUS COMORBIDITY AND BODY MASS INDEX (BMI) OF 32.0 TO 32.9 IN ADULT, UNSPECIFIED OBESITY TYPE: ICD-10-CM

## 2024-10-25 DIAGNOSIS — M25.571 CHRONIC PAIN OF RIGHT ANKLE: ICD-10-CM

## 2024-10-25 DIAGNOSIS — E78.2 MIXED HYPERLIPIDEMIA: ICD-10-CM

## 2024-10-25 DIAGNOSIS — J30.2 CHRONIC SEASONAL ALLERGIC RHINITIS: Primary | ICD-10-CM

## 2024-10-25 DIAGNOSIS — N40.1 BENIGN PROSTATIC HYPERPLASIA WITH URINARY FREQUENCY: ICD-10-CM

## 2024-10-25 DIAGNOSIS — F32.A DEPRESSION, UNSPECIFIED DEPRESSION TYPE: ICD-10-CM

## 2024-10-25 DIAGNOSIS — R35.0 BENIGN PROSTATIC HYPERPLASIA WITH URINARY FREQUENCY: ICD-10-CM

## 2024-10-25 DIAGNOSIS — G43.009 MIGRAINE WITHOUT AURA AND WITHOUT STATUS MIGRAINOSUS, NOT INTRACTABLE: ICD-10-CM

## 2024-10-25 PROBLEM — N39.43 POST-VOID DRIBBLING: Status: RESOLVED | Noted: 2022-10-07 | Resolved: 2024-10-25

## 2024-10-25 PROCEDURE — 99215 OFFICE O/P EST HI 40 MIN: CPT | Performed by: GENERAL PRACTICE

## 2024-10-25 RX ORDER — LEVOTHYROXINE SODIUM 88 MCG
88 TABLET ORAL DAILY
Qty: 90 TABLET | Refills: 3 | Status: SHIPPED | OUTPATIENT
Start: 2024-10-25

## 2024-10-25 NOTE — PROGRESS NOTES
Subjective   Fawad Morton is a 54 y.o. male.     Chief Complaint  He returns for a scheduled reassessment of multiple medical problems including shortness of breath with exertion, chronic allergic rhinitis, hypothyroidism, hyperlipidemia, prediabetes, nocturia, gastroesophageal reflux disease, and left knee pain    History of Present Illness     Shortness of Breath With Exertion  He has a history of short of breath with above average exertion.  This generally occurs with sudden spurts of significant exertion. He continues to deny any chest pain, orthopnea, PND, cough, calf pain or swelling of the ankles. CT coronary angiogram done on 3/23/15 was unremarkable, as was a stress test performed on 2/11/15  Lab Results   Component Value Date    WBC 6.23 10/24/2024    HGB 15.0 10/24/2024    HCT 45.1 10/24/2024    MCV 88.6 10/24/2024     10/24/2024     Chronic Allergic Rhinitis  He has a long history of intermittent sneezing, clear rhinorrhea, nasal congestion and postnasal drip. These symptoms are perennial with seasonal exacerbation. He has tried a number of antihistamines and nasal corticosteroids with a limited relief of symptoms.  He remains on OTC pseudoephedrine with some benefit and no apparent side effects    Hypothyroidism  He has a history of hypothyroidism associated with Hashimoto's disease. He remains on levothyroxine 88 qd.  Lab Results   Component Value Date    TSH 2.050 10/24/2024     Hyperlipidemia  His compliance with treatment has been fair.  He is currently being prescribed the following medication for his dyslipidemia - lifestyle modifcation.   Lab Results   Component Value Date    CHOL 202 (H) 10/24/2024    CHLPL 195 01/27/2016    TRIG 105 10/24/2024    HDL 40 10/24/2024     (H) 10/24/2024      Prediabetes  He has been unable to tolerate metformin or semaglutide  Lab Results   Component Value Date    GLUCOSE 105 (H) 10/24/2024    BUN 15 10/24/2024    CREATININE 1.08 10/24/2024      10/24/2024    K 4.0 10/24/2024     10/24/2024    CALCIUM 9.4 10/24/2024    PROTEINTOT 7.0 10/24/2024    ALBUMIN 4.1 10/24/2024    ALT 40 10/24/2024    AST 21 10/24/2024    ALKPHOS 75 10/24/2024    BILITOT 0.6 10/24/2024    GLOB 2.9 10/24/2024    AGRATIO 1.4 10/24/2024    BCR 13.9 10/24/2024    ANIONGAP 7.4 10/24/2024    EGFR 81.5 10/24/2024     Lab Results   Component Value Date    HGBA1C 6.00 (H) 10/24/2024     Nocturia  He gives a long history of nocturia.  He has experienced increasing daytime frequency associated with occasional urgency, a sense of incomplete voiding, and postvoid dribbling.  Over the last year or two he has had intermittent difficulty obtaining and maintaining an erection.  He continues to deny any hesitancy or decrease in his stream and there is no history of dysuria or hematuria.  He continues to deny any change in his voice or beard growth and there is no history of any lower extremity weakness, numbness, or tingling.  He underwent a urology assessment  with a trial of daily tadalafil with little impact on his symptoms.    GERD  He has a long history of of heartburn.  He continues to deny any abdominal bloating, dysphagia, dyspepsia, hematemesis, hoarseness, melena, nausea or unexpected weight loss. Symptoms appear to be worsened by large meals and lying down. Risk factors present for GERD include caffeine use. Risk factors absent for GERD are tobacco abuse, alcohol use and NSAID use. Studies performed so far include upper endoscopy, result: positive for reflux esophagitis, upper GI, result: negative, esophageal manometry and ph/imedence: positive for reflux . Treatments tried so far include proton pump inhibitor: omeprazole.  He denies any recent episodes    Left Knee Pain  He underwent an orthopedic assessment since last here for left knee pain and crepitus.  MRI performed on 7/25/2024 revealed a complex tear of the body and anterior horn of the lateral meniscus, mild  "osteoarthritis, and a trace Baker's cyst.  Conservative measures were agreed upon for now    The following portions of the patient's history were reviewed and updated as appropriate: allergies, current medications, past medical history, past social history, and problem list.    Review of Systems   Constitutional:  Positive for fatigue. Negative for chills and fever.   HENT:  Positive for congestion, postnasal drip, rhinorrhea and sneezing. Negative for ear pain, sinus pressure, sore throat and voice change.    Eyes:  Negative for visual disturbance.   Respiratory:  Positive for shortness of breath. Negative for cough and wheezing.    Cardiovascular:  Negative for chest pain, palpitations and leg swelling.   Gastrointestinal:  Positive for GERD. Negative for abdominal pain, blood in stool, constipation, diarrhea, nausea and vomiting.   Genitourinary:  Positive for difficulty urinating (occasional sense of incomplete voiding and/or postvoid dribbling), frequency, nocturia (2-3), erectile dysfunction (intermittent) and urgency (intermittent). Negative for dysuria, hematuria and urinary incontinence.   Musculoskeletal:  Positive for arthralgias and back pain. Negative for joint swelling, myalgias and neck pain.   Skin:  Negative for rash.   Neurological:  Negative for weakness and numbness.   Psychiatric/Behavioral:  Positive for decreased concentration, dysphoric mood, sleep disturbance and stress. Negative for suicidal ideas. The patient is nervous/anxious.      /62   Pulse 87   Temp 97.8 °F (36.6 °C) (Temporal)   Resp 14   Ht 182.9 cm (72\")   Wt 110 kg (242 lb)   SpO2 95%   BMI 32.82 kg/m²     BMI is >= 30 and <35. (Class 1 Obesity). The following options were offered after discussion;: exercise counseling/recommendations and nutrition counseling/recommendations    Objective   Physical Exam  Constitutional:       General: He is not in acute distress.     Appearance: Normal appearance. He is " well-developed. He is not ill-appearing or diaphoretic.      Comments: Bright and in good spirits. No apparent distress. No pallor, jaundice, diaphoresis, or cyanosis.   HENT:      Head: Atraumatic.      Right Ear: Tympanic membrane, ear canal and external ear normal.      Left Ear: Tympanic membrane, ear canal and external ear normal.      Mouth/Throat:      Lips: No lesions.      Mouth: Mucous membranes are moist. No oral lesions.      Pharynx: No oropharyngeal exudate or posterior oropharyngeal erythema.   Eyes:      General: Lids are normal. Gaze aligned appropriately. No visual field deficit.     Extraocular Movements: Extraocular movements intact.      Conjunctiva/sclera: Conjunctivae normal.      Pupils: Pupils are equal.   Neck:      Thyroid: No thyroid mass or thyromegaly.      Vascular: No carotid bruit or JVD.      Trachea: Trachea normal. No tracheal deviation.   Cardiovascular:      Rate and Rhythm: Normal rate and regular rhythm.      Heart sounds: Normal heart sounds, S1 normal and S2 normal. No murmur heard.     No gallop. No S3 or S4 sounds.   Pulmonary:      Effort: Pulmonary effort is normal.      Breath sounds: Normal breath sounds.   Abdominal:      General: Bowel sounds are normal. There is no distension.   Musculoskeletal:      Cervical back: No rigidity.      Right lower leg: No edema.      Left lower leg: No edema.   Lymphadenopathy:      Head:      Right side of head: No submental, submandibular, tonsillar, preauricular, posterior auricular or occipital adenopathy.      Left side of head: No submental, submandibular, tonsillar, preauricular, posterior auricular or occipital adenopathy.      Cervical: No cervical adenopathy.      Upper Body:      Right upper body: No supraclavicular adenopathy.      Left upper body: No supraclavicular adenopathy.   Skin:     General: Skin is warm and dry.      Coloration: Skin is not cyanotic, jaundiced or pale.      Findings: No rash.      Nails: There is  no clubbing.   Neurological:      Mental Status: He is alert and oriented to person, place, and time.      Cranial Nerves: No cranial nerve deficit, dysarthria or facial asymmetry.      Sensory: No sensory deficit.      Motor: No weakness, tremor or abnormal muscle tone.      Coordination: Coordination normal. Finger-Nose-Finger Test normal.      Gait: Gait normal.   Psychiatric:         Attention and Perception: Attention normal.         Mood and Affect: Mood normal.         Speech: Speech normal.         Behavior: Behavior normal.         Thought Content: Thought content normal.       Assessment & Plan   Problems Addressed this Visit          Allergies and Adverse Reactions    Chronic seasonal allergic rhinitis   Continue current medication  Encouraged to report if any worse or if any new symptoms or concerns.       Cardiac and Vasculature    Mixed hyperlipidemia  Encouraged to continue to work on his diet and exercise plan.       Endocrine and Metabolic    Class 1 obesity with serious comorbidity and body mass index (BMI) of 32.0 to 32.9 in adult    Hypothyroidism due to Hashimoto's thyroiditis  Clinically and bio-chemically euthyroid.  Continue current medication.    Relevant Medications    Synthroid 88 MCG tablet    Prediabetes  As above.  Reviewed options.  Patient interested in a trial of tirzepatide if approved by his insurance and this will be investigated  Scheduled for updated labs just prior to his return in 6 months       Gastrointestinal Abdominal     Gastroesophageal reflux disease without esophagitis  Encouraged to report if any worse or if any new symptoms or concerns.    Non-alcoholic fatty liver disease       Genitourinary and Reproductive     Benign prostatic hyperplasia with urinary frequency  Encouraged to report if any worse or if any new symptoms or concerns.    Erectile dysfunction       Health Encounters    Healthcare maintenance  Recommended flu and COVID-19 shots this fall.  Patient will  consider  Reminded of the potential benefits of shingles vaccination  Reminded of the potential benefits of colon cancer screening       Mental Health    Depression  Patient is considering an assessment with his wife psychiatrist.  He feels that he is managing at present but will report if any worse or if any new symptoms or concerns       Musculoskeletal and Injuries    Chronic pain of right ankle  Chronic pain of left knee  With evidence of a complex tear of the lateral meniscus on recent MRI  Encouraged to report if any worse or if any new symptoms or concerns.    Mechanical low back pain       Neuro    Migraine without aura and without status migrainosus, not intractable     Diagnoses         Codes Comments    Chronic seasonal allergic rhinitis    -  Primary ICD-10-CM: J30.2  ICD-9-CM: 477.8     Mixed hyperlipidemia     ICD-10-CM: E78.2  ICD-9-CM: 272.2     Prediabetes     ICD-10-CM: R73.03  ICD-9-CM: 790.29     Hypothyroidism due to Hashimoto's thyroiditis     ICD-10-CM: E06.3  ICD-9-CM: 245.2     Class 1 obesity with serious comorbidity and body mass index (BMI) of 32.0 to 32.9 in adult, unspecified obesity type     ICD-10-CM: E66.811, Z68.32  ICD-9-CM: 278.00, V85.32     Non-alcoholic fatty liver disease     ICD-10-CM: K76.0  ICD-9-CM: 571.8     Gastroesophageal reflux disease without esophagitis     ICD-10-CM: K21.9  ICD-9-CM: 530.81     Erectile dysfunction, unspecified erectile dysfunction type     ICD-10-CM: N52.9  ICD-9-CM: 607.84     Benign prostatic hyperplasia with urinary frequency     ICD-10-CM: N40.1, R35.0  ICD-9-CM: 600.01, 788.41     Healthcare maintenance     ICD-10-CM: Z00.00  ICD-9-CM: V70.0     Depression, unspecified depression type     ICD-10-CM: F32.A  ICD-9-CM: 311     Mechanical low back pain     ICD-10-CM: M54.59  ICD-9-CM: 724.2     Chronic pain of right ankle     ICD-10-CM: M25.571, G89.29  ICD-9-CM: 719.47, 338.29     Migraine without aura and without status migrainosus, not  intractable     ICD-10-CM: G43.009  ICD-9-CM: 346.10           I spent 41 minutes caring for Fawad Morton on this date of service. This time includes time spent by me in the following activities:reviewing tests, performing a medically appropriate examination and/or evaluation , counseling and educating the patient/family/caregiver, ordering medications, tests, or procedures and documenting information in the medical record

## 2024-10-29 ENCOUNTER — TELEPHONE (OUTPATIENT)
Dept: FAMILY MEDICINE CLINIC | Facility: CLINIC | Age: 54
End: 2024-10-29
Payer: COMMERCIAL

## 2024-10-29 DIAGNOSIS — R73.03 PREDIABETES: Primary | ICD-10-CM

## 2024-10-29 NOTE — TELEPHONE ENCOUNTER
----- Message from Ben Bowie sent at 10/29/2024 12:48 PM EDT -----  Venu  Please let him know that I've emailed a script to his pharm  ----- Message -----  From: Nuvia Joel MA  Sent: 10/29/2024   9:48 AM EDT  To: Ben Bowie MD    Approved (5mg)  ----- Message -----  From: Ben Bowie MD  Sent: 10/25/2024   4:10 PM EDT  To: Nuvia Joel MA    Please try to PA Mounjaro  Diagnosis-prediabetes  Intolerant to metformin and semaglutide

## 2024-11-20 ENCOUNTER — TELEPHONE (OUTPATIENT)
Dept: FAMILY MEDICINE CLINIC | Facility: CLINIC | Age: 54
End: 2024-11-20

## 2024-11-20 DIAGNOSIS — R73.03 PREDIABETES: Primary | ICD-10-CM

## 2024-11-20 RX ORDER — TIRZEPATIDE 5 MG/.5ML
5 INJECTION, SOLUTION SUBCUTANEOUS WEEKLY
Qty: 2 ML | Refills: 0 | Status: SHIPPED | OUTPATIENT
Start: 2024-11-20

## 2024-11-20 NOTE — TELEPHONE ENCOUNTER
Caller: Fawad Morton    Relationship: Self    Best call back number: [phone]     What is the best time to reach you: ANY    Who are you requesting to speak with (clinical staff, provider,  specific staff member): NURSE    Do you know the name of the person who called: PATIENT    What was the call regarding: PATIENT STATES THAT [employer] WAS CALLED IN.  HE WOULD LIKE MOUNJARO DUE TO INSURANCE REASONS.     PHARMACY: [facility]    Is it okay if the provider responds through MyChart: PHONE CALL PLEASE

## 2024-12-28 DIAGNOSIS — R73.03 PREDIABETES: ICD-10-CM

## 2024-12-31 RX ORDER — TIRZEPATIDE 5 MG/.5ML
INJECTION, SOLUTION SUBCUTANEOUS
Qty: 2 ML | Refills: 0 | Status: SHIPPED | OUTPATIENT
Start: 2024-12-31

## 2025-01-16 ENCOUNTER — TELEPHONE (OUTPATIENT)
Dept: FAMILY MEDICINE CLINIC | Facility: CLINIC | Age: 55
End: 2025-01-16

## 2025-01-16 RX ORDER — VALACYCLOVIR HYDROCHLORIDE 1 G/1
2000 TABLET, FILM COATED ORAL 2 TIMES DAILY
Qty: 4 TABLET | Refills: 5 | Status: SHIPPED | OUTPATIENT
Start: 2025-01-16

## 2025-01-16 NOTE — TELEPHONE ENCOUNTER
Caller: Fawad Morton    Relationship: Self    Best call back number: [phone]     What medication are you requesting: VALTREX    What are your current symptoms: FEVER BLISTERS    How long have you been experiencing symptoms:  WHOLE LIFE    Have you had these symptoms before:    [x] Yes  [] No    Have you been treated for these symptoms before:   [x] Yes  [] No    If a prescription is needed, what is your preferred pharmacy and phone number: [id number] [place] [address] AT [facility] [address]  - [phone]  - [phone] FX     Additional notes:

## 2025-02-15 DIAGNOSIS — R73.03 PREDIABETES: Primary | ICD-10-CM

## 2025-02-15 RX ORDER — TIRZEPATIDE 7.5 MG/.5ML
7.5 INJECTION, SOLUTION SUBCUTANEOUS WEEKLY
Qty: 2 ML | Refills: 0 | Status: SHIPPED | OUTPATIENT
Start: 2025-02-15 | End: 2025-02-17

## 2025-02-17 DIAGNOSIS — R73.03 PREDIABETES: Primary | ICD-10-CM

## 2025-02-17 RX ORDER — TIRZEPATIDE 5 MG/.5ML
5 INJECTION, SOLUTION SUBCUTANEOUS WEEKLY
Qty: 2 ML | Refills: 2 | Status: SHIPPED | OUTPATIENT
Start: 2025-02-17

## 2025-04-04 ENCOUNTER — OFFICE VISIT (OUTPATIENT)
Dept: FAMILY MEDICINE CLINIC | Facility: CLINIC | Age: 55
End: 2025-04-04
Payer: COMMERCIAL

## 2025-04-04 VITALS
TEMPERATURE: 98.7 F | OXYGEN SATURATION: 96 % | DIASTOLIC BLOOD PRESSURE: 65 MMHG | WEIGHT: 231 LBS | HEIGHT: 72 IN | HEART RATE: 97 BPM | BODY MASS INDEX: 31.29 KG/M2 | RESPIRATION RATE: 14 BRPM | SYSTOLIC BLOOD PRESSURE: 125 MMHG

## 2025-04-04 DIAGNOSIS — R73.03 PREDIABETES: ICD-10-CM

## 2025-04-04 DIAGNOSIS — G89.29 CHRONIC PAIN OF LEFT KNEE: ICD-10-CM

## 2025-04-04 DIAGNOSIS — E78.2 MIXED HYPERLIPIDEMIA: Primary | ICD-10-CM

## 2025-04-04 DIAGNOSIS — E06.3 HYPOTHYROIDISM DUE TO HASHIMOTO'S THYROIDITIS: ICD-10-CM

## 2025-04-04 DIAGNOSIS — J30.2 CHRONIC SEASONAL ALLERGIC RHINITIS: ICD-10-CM

## 2025-04-04 DIAGNOSIS — M25.562 CHRONIC PAIN OF LEFT KNEE: ICD-10-CM

## 2025-04-04 DIAGNOSIS — E66.811 CLASS 1 OBESITY WITH SERIOUS COMORBIDITY AND BODY MASS INDEX (BMI) OF 32.0 TO 32.9 IN ADULT, UNSPECIFIED OBESITY TYPE: ICD-10-CM

## 2025-04-04 DIAGNOSIS — K21.9 GASTROESOPHAGEAL REFLUX DISEASE WITHOUT ESOPHAGITIS: ICD-10-CM

## 2025-04-04 DIAGNOSIS — N40.1 BENIGN PROSTATIC HYPERPLASIA WITH URINARY FREQUENCY: ICD-10-CM

## 2025-04-04 DIAGNOSIS — Z12.11 SCREEN FOR COLON CANCER: ICD-10-CM

## 2025-04-04 DIAGNOSIS — M54.59 MECHANICAL LOW BACK PAIN: ICD-10-CM

## 2025-04-04 DIAGNOSIS — K76.0 NON-ALCOHOLIC FATTY LIVER DISEASE: ICD-10-CM

## 2025-04-04 DIAGNOSIS — Z00.00 HEALTHCARE MAINTENANCE: ICD-10-CM

## 2025-04-04 DIAGNOSIS — E78.2 MIXED HYPERLIPIDEMIA: ICD-10-CM

## 2025-04-04 DIAGNOSIS — N52.9 ERECTILE DYSFUNCTION, UNSPECIFIED ERECTILE DYSFUNCTION TYPE: ICD-10-CM

## 2025-04-04 DIAGNOSIS — G43.009 MIGRAINE WITHOUT AURA AND WITHOUT STATUS MIGRAINOSUS, NOT INTRACTABLE: ICD-10-CM

## 2025-04-04 DIAGNOSIS — R35.0 BENIGN PROSTATIC HYPERPLASIA WITH URINARY FREQUENCY: ICD-10-CM

## 2025-04-04 PROBLEM — R06.02 SHORTNESS OF BREATH ON EXERTION: Status: RESOLVED | Noted: 2017-11-11 | Resolved: 2025-04-04

## 2025-04-04 LAB
ALBUMIN SERPL-MCNC: 4 G/DL (ref 3.5–5.2)
ALBUMIN/GLOB SERPL: 1.3 G/DL
ALP SERPL-CCNC: 82 U/L (ref 39–117)
ALT SERPL W P-5'-P-CCNC: 35 U/L (ref 1–41)
ANION GAP SERPL CALCULATED.3IONS-SCNC: 11.3 MMOL/L (ref 5–15)
AST SERPL-CCNC: 23 U/L (ref 1–40)
BASOPHILS # BLD AUTO: 0.03 10*3/MM3 (ref 0–0.2)
BASOPHILS NFR BLD AUTO: 0.4 % (ref 0–1.5)
BILIRUB SERPL-MCNC: 0.7 MG/DL (ref 0–1.2)
BUN SERPL-MCNC: 16 MG/DL (ref 6–20)
BUN/CREAT SERPL: 14.7 (ref 7–25)
CALCIUM SPEC-SCNC: 9.7 MG/DL (ref 8.6–10.5)
CHLORIDE SERPL-SCNC: 107 MMOL/L (ref 98–107)
CHOLEST SERPL-MCNC: 203 MG/DL (ref 0–200)
CO2 SERPL-SCNC: 20.7 MMOL/L (ref 22–29)
CREAT SERPL-MCNC: 1.09 MG/DL (ref 0.76–1.27)
DEPRECATED RDW RBC AUTO: 40.2 FL (ref 37–54)
EGFRCR SERPLBLD CKD-EPI 2021: 80.2 ML/MIN/1.73
EOSINOPHIL # BLD AUTO: 0.17 10*3/MM3 (ref 0–0.4)
EOSINOPHIL NFR BLD AUTO: 2.3 % (ref 0.3–6.2)
ERYTHROCYTE [DISTWIDTH] IN BLOOD BY AUTOMATED COUNT: 12.4 % (ref 12.3–15.4)
GLOBULIN UR ELPH-MCNC: 3 GM/DL
GLUCOSE SERPL-MCNC: 97 MG/DL (ref 65–99)
HBA1C MFR BLD: 5.5 % (ref 4.8–5.6)
HCT VFR BLD AUTO: 46.9 % (ref 37.5–51)
HDLC SERPL-MCNC: 38 MG/DL (ref 40–60)
HGB BLD-MCNC: 15.6 G/DL (ref 13–17.7)
IMM GRANULOCYTES # BLD AUTO: 0.03 10*3/MM3 (ref 0–0.05)
IMM GRANULOCYTES NFR BLD AUTO: 0.4 % (ref 0–0.5)
LDLC SERPL CALC-MCNC: 146 MG/DL (ref 0–100)
LDLC/HDLC SERPL: 3.79 {RATIO}
LYMPHOCYTES # BLD AUTO: 2.15 10*3/MM3 (ref 0.7–3.1)
LYMPHOCYTES NFR BLD AUTO: 29.5 % (ref 19.6–45.3)
MCH RBC QN AUTO: 29.6 PG (ref 26.6–33)
MCHC RBC AUTO-ENTMCNC: 33.3 G/DL (ref 31.5–35.7)
MCV RBC AUTO: 89 FL (ref 79–97)
MONOCYTES # BLD AUTO: 0.64 10*3/MM3 (ref 0.1–0.9)
MONOCYTES NFR BLD AUTO: 8.8 % (ref 5–12)
NEUTROPHILS NFR BLD AUTO: 4.27 10*3/MM3 (ref 1.7–7)
NEUTROPHILS NFR BLD AUTO: 58.6 % (ref 42.7–76)
NRBC BLD AUTO-RTO: 0 /100 WBC (ref 0–0.2)
PLATELET # BLD AUTO: 287 10*3/MM3 (ref 140–450)
PMV BLD AUTO: 11.2 FL (ref 6–12)
POTASSIUM SERPL-SCNC: 4.4 MMOL/L (ref 3.5–5.2)
PROT SERPL-MCNC: 7 G/DL (ref 6–8.5)
RBC # BLD AUTO: 5.27 10*6/MM3 (ref 4.14–5.8)
SODIUM SERPL-SCNC: 139 MMOL/L (ref 136–145)
TRIGL SERPL-MCNC: 105 MG/DL (ref 0–150)
TSH SERPL DL<=0.05 MIU/L-ACNC: 1.2 UIU/ML (ref 0.27–4.2)
VLDLC SERPL-MCNC: 19 MG/DL (ref 5–40)
WBC NRBC COR # BLD AUTO: 7.29 10*3/MM3 (ref 3.4–10.8)

## 2025-04-04 PROCEDURE — 80050 GENERAL HEALTH PANEL: CPT | Performed by: GENERAL PRACTICE

## 2025-04-04 PROCEDURE — 83036 HEMOGLOBIN GLYCOSYLATED A1C: CPT | Performed by: GENERAL PRACTICE

## 2025-04-04 PROCEDURE — 80061 LIPID PANEL: CPT | Performed by: GENERAL PRACTICE

## 2025-04-04 RX ORDER — VALACYCLOVIR HYDROCHLORIDE 1 G/1
2000 TABLET, FILM COATED ORAL 2 TIMES DAILY
Qty: 20 TABLET | Refills: 1 | Status: SHIPPED | OUTPATIENT
Start: 2025-04-04

## 2025-04-04 NOTE — PROGRESS NOTES
Subjective   Fawad Morton is a 55 y.o. male.     Chief Complaint  He returns for a scheduled reassessment of multiple medical problems including shortness of breath on exertion, chronic allergic rhinitis, hypothyroidism, hyperlipidemia, prediabetes, nocturia, gastroesophageal reflux disease, and left knee pain    History of Present Illness     Shortness of Breath With Exertion  He has a long history of short of breath with above average exertion.  He has noted a significant improvement with his recent weight loss. He continues to deny any chest pain, orthopnea, PND, cough, calf pain or swelling of the ankles. CT coronary angiogram done on 3/23/15 was unremarkable, as was a stress test performed on 2/11/15    Chronic Allergic Rhinitis  He has a long history of intermittent sneezing, clear rhinorrhea, nasal congestion and postnasal drip. These symptoms are perennial with seasonal exacerbation. He has tried a number of antihistamines and nasal corticosteroids with a limited relief of symptoms.  He remains on OTC pseudoephedrine with some benefit and no apparent side effects    Hypothyroidism  He has a history of hypothyroidism associated with Hashimoto's disease. He remains on levothyroxine 88 qd.  He had labs drawn this morning    Hyperlipidemia  His compliance with treatment has been fair.  He is currently being prescribed the following medication for his dyslipidemia - lifestyle modifcation.      Prediabetes  He is currently on tirzepatide 5 weekly.  He experienced transient nausea, and constipation with this but has noted a significant improvement in the side effects.  He denies any vomiting, abdominal pain, hematochezia, or melena.  He was unable to tolerate metformin or semaglutide    Nocturia  He gives a long history of nocturia.  He has experienced increasing daytime frequency associated with occasional urgency, a sense of incomplete voiding, and postvoid dribbling.  Over the last year or two he has had  intermittent difficulty obtaining and maintaining an erection.  He continues to deny any hesitancy or decrease in his stream and there is no history of dysuria or hematuria.  He continues to deny any change in his voice or beard growth and there is no history of any lower extremity weakness, numbness, or tingling.  He underwent a urology assessment  with a trial of daily tadalafil with little impact on his symptoms.    GERD  He has a long history of of heartburn.  He continues to deny any abdominal bloating, dysphagia, dyspepsia, hematemesis, hoarseness, melena, nausea or unexpected weight loss. Symptoms appear to be worsened by large meals and lying down. Risk factors present for GERD include caffeine use. Risk factors absent for GERD are tobacco abuse, alcohol use and NSAID use. Studies performed so far include upper endoscopy, result: positive for reflux esophagitis, upper GI, result: negative, esophageal manometry and ph/imedence: positive for reflux . Treatments tried so far include proton pump inhibitor: omeprazole.  He denies any recent episodes    Left Knee Pain  He underwent an orthopedic assessment since last here for left knee pain and crepitus.  MRI performed on 7/25/2024 revealed a complex tear of the body and anterior horn of the lateral meniscus, mild osteoarthritis, and a trace Baker's cyst.  Conservative measures were agreed upon, but his symptoms have persisted and he would like a second opinion    The following portions of the patient's history were reviewed and updated as appropriate: allergies, current medications, past medical history, past social history, and problem list.    Review of Systems   Constitutional:  Positive for fatigue. Negative for chills and fever.   HENT:  Positive for congestion, postnasal drip, rhinorrhea and sneezing. Negative for ear pain, sinus pressure, sore throat and voice change.    Eyes:  Negative for visual disturbance.   Respiratory:  Positive for shortness of  breath. Negative for cough and wheezing.    Cardiovascular:  Negative for chest pain, palpitations and leg swelling.   Gastrointestinal:  Positive for GERD. Negative for abdominal pain, blood in stool, constipation, diarrhea, nausea and vomiting.   Genitourinary:  Positive for difficulty urinating (occasional sense of incomplete voiding and/or postvoid dribbling), frequency, nocturia (2-3), erectile dysfunction (intermittent) and urgency (intermittent). Negative for dysuria, hematuria and urinary incontinence.   Musculoskeletal:  Positive for arthralgias and back pain. Negative for joint swelling, myalgias and neck pain.   Skin:  Negative for rash.   Neurological:  Negative for weakness and numbness.   Psychiatric/Behavioral:  Positive for decreased concentration, dysphoric mood, sleep disturbance and stress. Negative for suicidal ideas. The patient is nervous/anxious.      Objective   Physical Exam  Constitutional:       General: He is not in acute distress.     Appearance: Normal appearance. He is well-developed. He is not ill-appearing or diaphoretic.      Comments: Bright and in good spirits. No apparent distress. No pallor, jaundice, diaphoresis, or cyanosis.   HENT:      Head: Atraumatic.      Right Ear: Tympanic membrane, ear canal and external ear normal.      Left Ear: Tympanic membrane, ear canal and external ear normal.      Mouth/Throat:      Lips: No lesions.      Mouth: Mucous membranes are moist. No oral lesions.      Pharynx: No oropharyngeal exudate or posterior oropharyngeal erythema.   Eyes:      General: Lids are normal. Gaze aligned appropriately. No visual field deficit.     Extraocular Movements: Extraocular movements intact.      Conjunctiva/sclera: Conjunctivae normal.      Pupils: Pupils are equal.   Neck:      Thyroid: No thyroid mass or thyromegaly.      Vascular: No carotid bruit or JVD.      Trachea: Trachea normal. No tracheal deviation.   Cardiovascular:      Rate and Rhythm: Normal  rate and regular rhythm.      Heart sounds: Normal heart sounds, S1 normal and S2 normal. No murmur heard.     No gallop. No S3 or S4 sounds.   Pulmonary:      Effort: Pulmonary effort is normal.      Breath sounds: Normal breath sounds.   Abdominal:      General: Bowel sounds are normal. There is no distension.   Musculoskeletal:      Cervical back: No rigidity.      Right lower leg: No edema.      Left lower leg: No edema.   Lymphadenopathy:      Head:      Right side of head: No submental, submandibular, tonsillar, preauricular, posterior auricular or occipital adenopathy.      Left side of head: No submental, submandibular, tonsillar, preauricular, posterior auricular or occipital adenopathy.      Cervical: No cervical adenopathy.      Upper Body:      Right upper body: No supraclavicular adenopathy.      Left upper body: No supraclavicular adenopathy.   Skin:     General: Skin is warm and dry.      Coloration: Skin is not cyanotic, jaundiced or pale.      Findings: No rash.      Nails: There is no clubbing.   Neurological:      Mental Status: He is alert and oriented to person, place, and time.      Cranial Nerves: No cranial nerve deficit, dysarthria or facial asymmetry.      Sensory: No sensory deficit.      Motor: No weakness, tremor or abnormal muscle tone.      Coordination: Coordination normal. Finger-Nose-Finger Test normal.      Gait: Gait normal.   Psychiatric:         Attention and Perception: Attention normal.         Mood and Affect: Mood normal.         Speech: Speech normal.         Behavior: Behavior normal.         Thought Content: Thought content normal.       Assessment & Plan   Problems Addressed this Visit          Allergies and Adverse Reactions    Chronic seasonal allergic rhinitis  Continue current medication  Encouraged to report if any worse or if any new symptoms or concerns.       Cardiac and Vasculature    Mixed hyperlipidemia   Encouraged to continue to work on his diet and exercise  plan.  Will continue to monitor       Endocrine and Metabolic    Class 1 obesity with serious comorbidity and body mass index (BMI) of 32.0 to 32.9 in adult  As above.    Hypothyroidism due to Hashimoto's thyroiditis  Clinically euthyroid.  Continue current medication.  Updated labs drawn.    Prediabetes  As above.   Continue current medication.  Tirzepatide will be titrated as tolerated       Gastrointestinal Abdominal     Gastroesophageal reflux disease without esophagitis  Encouraged to report if any worse or if any new symptoms or concerns.    Non-alcoholic fatty liver disease  As above.  Will continue to monitor       Genitourinary and Reproductive     Benign prostatic hyperplasia with urinary frequency  Encouraged to report if any worse or if any new symptoms or concerns.    Erectile dysfunction       Health Encounters    Healthcare maintenance  Recommended Prevnar and Shingrix.  Patient uninterested but will consider  Reminded of the potential benefits of colon cancer screening.  He would like to proceed and arrangements will be made    Relevant Orders    Ambulatory Referral For Screening Colonoscopy (Completed)       Musculoskeletal and Injuries    Chronic pain of left knee  Reminded regarding symptomatic treatment.   Will arrange another orthopedic opinion  Encourage report if any worse or if any new symptoms or concerns in the meantime    Relevant Orders    Ambulatory Referral to Orthopedic Surgery    Mechanical low back pain       Neuro    Migraine without aura and without status migrainosus, not intractable          Diagnoses         Codes Comments      Mixed hyperlipidemia [E78.2]    -  Primary ICD-10-CM: E78.2  ICD-9-CM: 272.2       Non-alcoholic fatty liver disease     ICD-10-CM: K76.0  ICD-9-CM: 571.8       Gastroesophageal reflux disease without esophagitis     ICD-10-CM: K21.9  ICD-9-CM: 530.81       Migraine without aura and without status migrainosus, not intractable     ICD-10-CM:  G43.009  ICD-9-CM: 346.10       Mixed hyperlipidemia     ICD-10-CM: E78.2  ICD-9-CM: 272.2       Prediabetes     ICD-10-CM: R73.03  ICD-9-CM: 790.29       Hypothyroidism due to Hashimoto's thyroiditis     ICD-10-CM: E06.3  ICD-9-CM: 245.2       Chronic seasonal allergic rhinitis     ICD-10-CM: J30.2  ICD-9-CM: 477.8       Class 1 obesity with serious comorbidity and body mass index (BMI) of 32.0 to 32.9 in adult, unspecified obesity type     ICD-10-CM: E66.811, Z68.32  ICD-9-CM: 278.00, V85.32       Erectile dysfunction, unspecified erectile dysfunction type     ICD-10-CM: N52.9  ICD-9-CM: 607.84       Benign prostatic hyperplasia with urinary frequency     ICD-10-CM: N40.1, R35.0  ICD-9-CM: 600.01, 788.41       Healthcare maintenance     ICD-10-CM: Z00.00  ICD-9-CM: V70.0       Mechanical low back pain     ICD-10-CM: M54.59  ICD-9-CM: 724.2       Screen for colon cancer     ICD-10-CM: Z12.11  ICD-9-CM: V76.51       Chronic pain of left knee     ICD-10-CM: M25.562, G89.29  ICD-9-CM: 719.46, 338.29

## 2025-04-07 ENCOUNTER — TELEPHONE (OUTPATIENT)
Dept: FAMILY MEDICINE CLINIC | Facility: CLINIC | Age: 55
End: 2025-04-07
Payer: COMMERCIAL

## 2025-04-07 NOTE — TELEPHONE ENCOUNTER
HUB TO READ/RELAY    Called and left message for patient letting him know he is scheduled with Dr. Barnes in [place] on April 28, 2025, at 10:30 am for the colonoscopy procedure. He will be receiving a letter in the mail with instructions and a prescription for the prep.   Also make patient aware he MUST be off the mounjaro for 7 full days prior to procedure or they will not be able to complete.     He must have a  who will remain present with him at the clinic the entire time.

## 2025-04-21 ENCOUNTER — TELEPHONE (OUTPATIENT)
Dept: FAMILY MEDICINE CLINIC | Facility: CLINIC | Age: 55
End: 2025-04-21

## 2025-04-21 NOTE — TELEPHONE ENCOUNTER
Let the patient know that Dr. Gunderson office [facility]

## 2025-04-21 NOTE — TELEPHONE ENCOUNTER
Caller: Fawad Morton    Relationship: Self    Best call back number: [phone]    What form or medical record are you requesting:     OFFICE NOTES (ANYTHING) FROM DOCTOR OTTO    Who is requesting this form or medical record from you:     [facility]    How would you like to receive the form or medical records (pick-up, mail, fax):   FAX    Timeframe paperwork needed:     AS SOON AS POSSIBLE

## 2025-05-02 ENCOUNTER — TELEPHONE (OUTPATIENT)
Dept: FAMILY MEDICINE CLINIC | Facility: CLINIC | Age: 55
End: 2025-05-02

## 2025-05-02 RX ORDER — DOXYCYCLINE 100 MG/1
200 CAPSULE ORAL ONCE
Qty: 2 CAPSULE | Refills: 0 | Status: SHIPPED | OUTPATIENT
Start: 2025-05-02 | End: 2025-05-02

## 2025-05-02 NOTE — TELEPHONE ENCOUNTER
Caller: Fawad Morton    Relationship: Self    Best call back number: [phone]     What medication are you requesting: ANTIBIOTIC/DOXYCYCLINE    What are your current symptoms: FOR A TICK BITE, HE HAS HAD THESE BEFORE    How long have you been experiencing symptoms: THIS BITE HAPPENED YESTERDAY.     Have you had these symptoms before:    [x] Yes  [] No    Have you been treated for these symptoms before:   [x] Yes  [] No    If a prescription is needed, what is your preferred pharmacy and phone number: [id number] [address] [facility] [address] - [phone]  - [phone] FX     Additional notes: PATIENT WAS BITTEN YESTERDAY AND HAS A BAD SPOT FROM IT. HE SAYS HE HAS BEEN TREATED BEFORE FOR OTHER TICK BITES.

## 2025-05-14 DIAGNOSIS — R73.03 PREDIABETES: ICD-10-CM

## 2025-05-15 RX ORDER — TIRZEPATIDE 7.5 MG/.5ML
INJECTION, SOLUTION SUBCUTANEOUS
Qty: 2 ML | Refills: 0 | OUTPATIENT
Start: 2025-05-15

## 2025-06-11 DIAGNOSIS — R73.03 PREDIABETES: ICD-10-CM

## 2025-06-12 RX ORDER — TIRZEPATIDE 7.5 MG/.5ML
INJECTION, SOLUTION SUBCUTANEOUS
Qty: 2 ML | Refills: 0 | OUTPATIENT
Start: 2025-06-12

## 2025-06-16 ENCOUNTER — TELEPHONE (OUTPATIENT)
Dept: FAMILY MEDICINE CLINIC | Facility: CLINIC | Age: 55
End: 2025-06-16
Payer: COMMERCIAL

## 2025-06-16 DIAGNOSIS — R73.03 PREDIABETES: Primary | ICD-10-CM

## 2025-06-16 RX ORDER — TIRZEPATIDE 7.5 MG/.5ML
7.5 INJECTION, SOLUTION SUBCUTANEOUS WEEKLY
Qty: 2 ML | Refills: 0 | Status: SHIPPED | OUTPATIENT
Start: 2025-06-16

## 2025-06-16 NOTE — TELEPHONE ENCOUNTER
----- Message from Ben Bowie sent at 6/16/2025 12:32 PM EDT -----  Emailed  Thanks  ----- Message -----  From: Kathie Manley MA  Sent: 6/16/2025   9:34 AM EDT  To: Ben Bowie MD    Pt called in for a refill on mounjaro but he wants the 7.5mg sent to [facility] in [place].

## 2025-07-15 DIAGNOSIS — R73.03 PREDIABETES: ICD-10-CM

## 2025-07-15 RX ORDER — TIRZEPATIDE 7.5 MG/.5ML
7.5 INJECTION, SOLUTION SUBCUTANEOUS WEEKLY
Qty: 2 ML | Refills: 5 | Status: SHIPPED | OUTPATIENT
Start: 2025-07-15

## 2025-07-15 RX ORDER — TIRZEPATIDE 7.5 MG/.5ML
7.5 INJECTION, SOLUTION SUBCUTANEOUS WEEKLY
Qty: 2 ML | Refills: 0 | Status: CANCELLED | OUTPATIENT
Start: 2025-07-15

## 2025-08-04 ENCOUNTER — OFFICE VISIT (OUTPATIENT)
Dept: FAMILY MEDICINE CLINIC | Facility: CLINIC | Age: 55
End: 2025-08-04
Payer: COMMERCIAL

## 2025-08-04 VITALS
WEIGHT: 218 LBS | TEMPERATURE: 98.4 F | SYSTOLIC BLOOD PRESSURE: 120 MMHG | RESPIRATION RATE: 14 BRPM | HEART RATE: 75 BPM | OXYGEN SATURATION: 96 % | DIASTOLIC BLOOD PRESSURE: 66 MMHG | HEIGHT: 72 IN | BODY MASS INDEX: 29.53 KG/M2

## 2025-08-04 DIAGNOSIS — M25.562 CHRONIC PAIN OF LEFT KNEE: ICD-10-CM

## 2025-08-04 DIAGNOSIS — N52.9 ERECTILE DYSFUNCTION, UNSPECIFIED ERECTILE DYSFUNCTION TYPE: ICD-10-CM

## 2025-08-04 DIAGNOSIS — G89.29 CHRONIC PAIN OF RIGHT ANKLE: ICD-10-CM

## 2025-08-04 DIAGNOSIS — E78.2 MIXED HYPERLIPIDEMIA: ICD-10-CM

## 2025-08-04 DIAGNOSIS — F32.A DEPRESSION, UNSPECIFIED DEPRESSION TYPE: ICD-10-CM

## 2025-08-04 DIAGNOSIS — R73.03 PREDIABETES: ICD-10-CM

## 2025-08-04 DIAGNOSIS — K60.2 ANAL FISSURE: ICD-10-CM

## 2025-08-04 DIAGNOSIS — K76.0 NON-ALCOHOLIC FATTY LIVER DISEASE: ICD-10-CM

## 2025-08-04 DIAGNOSIS — Z00.00 HEALTHCARE MAINTENANCE: ICD-10-CM

## 2025-08-04 DIAGNOSIS — G89.29 CHRONIC PAIN OF LEFT KNEE: ICD-10-CM

## 2025-08-04 DIAGNOSIS — E06.3 HYPOTHYROIDISM DUE TO HASHIMOTO'S THYROIDITIS: ICD-10-CM

## 2025-08-04 DIAGNOSIS — M54.59 MECHANICAL LOW BACK PAIN: ICD-10-CM

## 2025-08-04 DIAGNOSIS — J30.2 CHRONIC SEASONAL ALLERGIC RHINITIS: ICD-10-CM

## 2025-08-04 DIAGNOSIS — Z12.5 ENCOUNTER FOR PROSTATE CANCER SCREENING: ICD-10-CM

## 2025-08-04 DIAGNOSIS — E66.3 OVERWEIGHT: Primary | ICD-10-CM

## 2025-08-04 DIAGNOSIS — R35.0 BENIGN PROSTATIC HYPERPLASIA WITH URINARY FREQUENCY: ICD-10-CM

## 2025-08-04 DIAGNOSIS — N40.1 BENIGN PROSTATIC HYPERPLASIA WITH URINARY FREQUENCY: ICD-10-CM

## 2025-08-04 DIAGNOSIS — K21.9 GASTROESOPHAGEAL REFLUX DISEASE WITHOUT ESOPHAGITIS: ICD-10-CM

## 2025-08-04 DIAGNOSIS — M25.571 CHRONIC PAIN OF RIGHT ANKLE: ICD-10-CM

## 2025-08-04 PROBLEM — M72.2 PLANTAR FASCIITIS: Status: RESOLVED | Noted: 2018-05-11 | Resolved: 2025-08-04

## 2025-08-04 PROCEDURE — 99215 OFFICE O/P EST HI 40 MIN: CPT | Performed by: GENERAL PRACTICE

## 2025-08-04 RX ORDER — TIRZEPATIDE 10 MG/.5ML
10 INJECTION, SOLUTION SUBCUTANEOUS WEEKLY
Qty: 2 ML | Refills: 5 | Status: SHIPPED | OUTPATIENT
Start: 2025-08-04

## 2025-08-04 RX ORDER — LEVOTHYROXINE SODIUM 88 MCG
88 TABLET ORAL DAILY
Qty: 90 TABLET | Refills: 3 | Status: SHIPPED | OUTPATIENT
Start: 2025-08-04

## 2025-08-04 RX ORDER — DULOXETIN HYDROCHLORIDE 60 MG/1
60 CAPSULE, DELAYED RELEASE ORAL EVERY MORNING
COMMUNITY